# Patient Record
Sex: FEMALE | Race: BLACK OR AFRICAN AMERICAN | NOT HISPANIC OR LATINO | Employment: OTHER | URBAN - METROPOLITAN AREA
[De-identification: names, ages, dates, MRNs, and addresses within clinical notes are randomized per-mention and may not be internally consistent; named-entity substitution may affect disease eponyms.]

---

## 2017-01-17 ENCOUNTER — HOSPITAL ENCOUNTER (EMERGENCY)
Facility: OTHER | Age: 62
Discharge: HOME | End: 2017-01-17
Attending: EMERGENCY MEDICINE
Payer: COMMERCIAL

## 2017-01-17 VITALS
HEIGHT: 64 IN | OXYGEN SATURATION: 100 % | TEMPERATURE: 98 F | BODY MASS INDEX: 28.85 KG/M2 | SYSTOLIC BLOOD PRESSURE: 138 MMHG | RESPIRATION RATE: 16 BRPM | HEART RATE: 57 BPM | DIASTOLIC BLOOD PRESSURE: 75 MMHG | WEIGHT: 169 LBS

## 2017-01-17 DIAGNOSIS — R52 PAIN: ICD-10-CM

## 2017-01-17 DIAGNOSIS — M25.512 ACUTE PAIN OF LEFT SHOULDER: Primary | ICD-10-CM

## 2017-01-17 DIAGNOSIS — J06.9 VIRAL URI: ICD-10-CM

## 2017-01-17 PROCEDURE — 93010 ELECTROCARDIOGRAM REPORT: CPT | Mod: ,,, | Performed by: INTERNAL MEDICINE

## 2017-01-17 PROCEDURE — 99284 EMERGENCY DEPT VISIT MOD MDM: CPT

## 2017-01-17 RX ORDER — NAPROXEN 500 MG/1
500 TABLET ORAL
Status: DISCONTINUED | OUTPATIENT
Start: 2017-01-17 | End: 2017-01-17 | Stop reason: HOSPADM

## 2017-01-17 RX ORDER — NAPROXEN 500 MG/1
500 TABLET ORAL 2 TIMES DAILY WITH MEALS
Qty: 20 TABLET | Refills: 0 | Status: SHIPPED | OUTPATIENT
Start: 2017-01-17 | End: 2017-07-31

## 2017-01-17 RX ORDER — FLUTICASONE PROPIONATE 50 MCG
1 SPRAY, SUSPENSION (ML) NASAL 2 TIMES DAILY PRN
Qty: 15 G | Refills: 0 | Status: SHIPPED | OUTPATIENT
Start: 2017-01-17 | End: 2017-07-31

## 2017-01-17 NOTE — ED AVS SNAPSHOT
[facility]  [address]  [place] [address]               Jamee Purvis   2017  7:06 PM   ED    Description:  Female : 1955   Department:  [facility]           Your Care was Coordinated By:     Provider Role From To    Danika Andino MD Attending Provider 17 --    Margo Winchester PA-C Physician Assistant 17 --      Reason for Visit     Shoulder Pain     Nasal Congestion           Diagnoses this Visit        Comments    Acute pain of left shoulder    -  Primary     Pain         Viral URI           ED Disposition     None           To Do List           Follow-up Information     Follow up with [facility] In 2 days.    Contact information:    [address]  [place] [id number] [phone]          Follow up with [facility].    Specialty:  Emergency Medicine    Why:  If symptoms worsen    Contact information:    [address]  [facility] [address] [phone]       These Medications        Disp Refills Start End    naproxen (NAPROSYN) 500 MG tablet 20 tablet 0 2017     Take 1 tablet (500 mg total) by mouth 2 (two) times daily with meals. - Oral    fluticasone (FLONASE) 50 mcg/actuation nasal spray 15 g 0 2017     1 spray by Each Nare route 2 (two) times daily as needed. - Each Nare      [facility] On Call     [facility] On Call Nurse Care Line -  Assistance  Registered nurses in the [facility] provide clinical advisement, health education, appointment booking, and other advisory services.  Call for this free service at [phone].             Medications           Message regarding Medications     Verify the changes and/or additions to your medication regime listed below are the same as discussed with your clinician today.  If any of these changes or additions are incorrect, please notify your healthcare provider.        START taking these  "NEW medications        Refills    naproxen (NAPROSYN) 500 MG tablet 0    Sig: Take 1 tablet (500 mg total) by mouth 2 (two) times daily with meals.    Class: Print    Route: Oral    fluticasone (FLONASE) 50 mcg/actuation nasal spray 0    Si spray by Each Nare route 2 (two) times daily as needed.    Class: Print    Route: Each Nare      These medications were administered today        Dose Freq    naproxen tablet 500 mg 500 mg ED 1 Time    Sig: Take 1 tablet (500 mg total) by mouth ED 1 Time.    Class: Normal    Route: Oral           Verify that the below list of medications is an accurate representation of the medications you are currently taking.  If none reported, the list may be blank. If incorrect, please contact your healthcare provider. Carry this list with you in case of emergency.           Current Medications     fluticasone (FLONASE) 50 mcg/actuation nasal spray 1 spray by Each Nare route 2 (two) times daily as needed.    naproxen (NAPROSYN) 500 MG tablet Take 1 tablet (500 mg total) by mouth 2 (two) times daily with meals.    naproxen tablet 500 mg Take 1 tablet (500 mg total) by mouth ED 1 Time.           Clinical Reference Information           Your Vitals Were     BP Pulse Temp Resp Height Weight    174/80 (BP Location: Left arm, Patient Position: Sitting) 59 98.4 °F (36.9 °C) (Oral) 18 5' 4" (1.626 m) 76.7 kg (169 lb)    SpO2 BMI             100% 29.01 kg/m2         Allergies as of 2017     No Known Allergies      Immunizations Administered on Date of Encounter - 2017     None      ED Micro, Lab, POCT     None      ED Imaging Orders     Start Ordered       Status Ordering Provider    17  X-Ray Shoulder Trauma Left  1 time imaging      Final result       Discharge References/Attachments     ARTHRALGIA (ENGLISH)    URI, VIRAL, NO ABX (ADULT) (ENGLISH)    SHOULDER PAIN, UNCERTAIN CAUSE (ENGLISH)    LARYNGITIS (ENGLISH)      [facility] Sign-Up     Activating your " [facility] account is as easy as 1-2-3!     1) Visit [email or web address], select Sign Up Now, enter this activation code and your date of birth, then select Next.  [email or web address]  Expires: 3/3/2017  7:50 PM      2) Create a username and password to use when you visit [facility] in the future and select a security question in case you lose your password and select Next.    3) Enter your e-mail address and click Sign Up!    Additional Information  If you have questions, please e-mail [email or web address] or call [phone] to talk to our [facility] staff. Remember, [facility] is NOT to be used for urgent needs. For medical emergencies, dial 911.          [facility] complies with applicable Federal civil rights laws and does not discriminate on the basis of race, color, national origin, age, disability, or sex.        Language Assistance Services     ATTENTION: Language assistance services are available, free of charge. Please call [phone].      ATENCIÓN: Si habla español, tiene a mosquera disposición servicios gratuitos de asistencia lingüística. Llame al [phone].     GILBERT Ý: N?u b?n nói Ti?ng Vi?t, có các d?ch v? h? tr? ngôn ng? mi?n phí dành cho b?n. G?i s? [phone].

## 2017-01-18 NOTE — ED PROVIDER NOTES
Encounter Date: 1/17/2017       History     Chief Complaint   Patient presents with    Shoulder Pain     pt c/o left shoulder pain that started a few weeks ago; denies any falls/injuries; some SOB with exertion    Nasal Congestion     pt reports nasal congestion with a mild cough and hoarseness     Review of patient's allergies indicates:  No Known Allergies  HPI Comments: Patient is a 61 y.o. female with a past medical history of diabetes, presenting to the emergency department with complaints of nasal congestion and left shoulder pain.  The patient reports that she has had had left shoulder pain for over 2 months.  She denies injury or trauma, but states that it worsens with movement.  She denies numbness, tingling, weakness of her upper extremities bilaterally.  She denies chest pain, reports some shortness of breath after she walks for a long time.  In addition, she reports she has developed nasal congestion and lost her voice over the past 2 days.  She denies taking any medication for symptoms thus far.  She denies fever, chills, nausea, vomiting.  No known sick contacts.    The history is provided by the patient.     Past Medical History   Diagnosis Date    Diabetes mellitus      No past medical history pertinent negatives.  Past Surgical History   Procedure Laterality Date    Hysterectomy       History reviewed. No pertinent family history.  Social History   Substance Use Topics    Smoking status: Never Smoker    Smokeless tobacco: None    Alcohol use No     Review of Systems   Constitutional: Negative for activity change, appetite change, chills, fatigue and fever.   HENT: Positive for congestion and rhinorrhea. Negative for sinus pressure, sneezing, sore throat and trouble swallowing.    Eyes: Negative for photophobia and visual disturbance.   Respiratory: Negative for cough, chest tightness, shortness of breath and wheezing.    Cardiovascular: Negative for chest pain and palpitations.    Gastrointestinal: Negative for abdominal pain, constipation, diarrhea, nausea and vomiting.   Genitourinary: Negative for dysuria, hematuria and urgency.   Musculoskeletal: Positive for arthralgias and myalgias. Negative for back pain, neck pain and neck stiffness.   Skin: Negative for color change and wound.   Neurological: Negative for dizziness, weakness, light-headedness, numbness and headaches.   Psychiatric/Behavioral: Negative for agitation and confusion.       Physical Exam   Initial Vitals   BP Pulse Resp Temp SpO2   01/17/17 1743 01/17/17 1743 01/17/17 1743 01/17/17 1743 01/17/17 1743   174/80 59 18 98.4 °F (36.9 °C) 100 %     Physical Exam    Nursing note and vitals reviewed.  Constitutional: She appears well-developed and well-nourished. She is not diaphoretic. She is cooperative.  Non-toxic appearance. She does not have a sickly appearance. She does not appear ill. No distress.   Well appearing, -American female unaccompanied in the emergency department.  She is speaking in clear and full sentences, moving all extremities, ambulates without difficulty.  She is in no acute distress.   HENT:   Head: Normocephalic and atraumatic.   Right Ear: Hearing, tympanic membrane, external ear and ear canal normal.   Left Ear: Hearing, tympanic membrane, external ear and ear canal normal.   Nose: Mucosal edema and rhinorrhea present.   Mouth/Throat: Uvula is midline and mucous membranes are normal.   Posterior oral pharyngeal cobblestoning   Eyes: Conjunctivae and EOM are normal.   Neck: Normal range of motion. Neck supple.   Cardiovascular: Normal rate, regular rhythm and normal heart sounds.   Pulmonary/Chest: Breath sounds normal. No respiratory distress. She has no wheezes. She has no rhonchi. She has no rales.   Lungs are clear to auscultation bilaterally   Abdominal: Soft. Bowel sounds are normal. She exhibits no distension. There is no tenderness. There is no rebound and no guarding.    Musculoskeletal: Normal range of motion.        Arms:  Mild tenderness to palpation of the right posterior shoulder with no palpable deformity, crepitus, step-off.  Normal range of motion, strength, sensation.  Good pulses bilaterally.   Neurological: She is alert and oriented to person, place, and time. GCS eye subscore is 4. GCS verbal subscore is 5. GCS motor subscore is 6.   Skin: Skin is warm.   Psychiatric: She has a normal mood and affect. Her behavior is normal. Judgment and thought content normal.         ED Course   Procedures  Labs Reviewed - No data to display  EKG Readings: (Independently Interpreted)   Initial Reading: No STEMI. Rhythm: Sinus Bradycardia. Heart Rate: 57. Ectopy: No Ectopy. Conduction: Normal.     Imaging Results         X-Ray Shoulder Trauma Left (Final result) Result time:  01/17/17 19:43:56    Final result by Joe Garcia MD (01/17/17 19:43:56)    Impression:        No radiographic evidence for acute traumatic injury.  ______________________________________     Electronically signed by resident: JOE GARCIA MD  Date:     01/17/17  Time:    19:38            As the supervising and teaching physician, I personally reviewed the images and resident's interpretation and I agree with the findings.          Electronically signed by: ELI CALDERA MD  Date:     01/17/17  Time:    19:43     Narrative:    3 view left shoulder.    Comparison: None.    Findings:    No evidence for acute displaced fracture or dislocation.  No significant change.  No appreciable soft tissue swelling.  Visualized left lung fields are clear.             X-Rays:   Independently Interpreted Readings:   Other Readings:  X-ray left shoulder - no acute fracture or dislocation     Medical Decision Making:   Independently Interpreted Test(s):   I have ordered and independently interpreted X-rays - see prior notes.  I have ordered and independently interpreted EKG Reading(s) - see prior notes  Clinical Tests:   Radiological  Study: Ordered and Reviewed  Medical Tests: Ordered and Reviewed  Other:   I have discussed this case with another health care provider.       <> Summary of the Discussion: Dr. Andino  This note was created using Dragon Medical Dictation. There may be typographical errors secondary to dictation.     Urgent evaluation of a 61 y.o. female with a past medical history of DM, presenting to the emergency department complaining of nasal congestion, rhinorrhea and left shoulder pain. Patient is afebrile, nontoxic appearing, hemodynamically stable and neurovascularly intact.  Physical exam reveals regular rate and rhythm, lungs are clear to auscultation bilaterally.  Boggy nasal mucosa noted with posterior oropharyngeal cobblestoning.  Tenderness to palpation of the left posterior shoulder with no palpable deformity, crepitus, step-off.  Normal range of motion, strength, sensation.  Given the patient's history and physical exam findings, I do suspect muscular fell etiology in addition to a viral URI.  We'll obtain x-ray, EKG, and reassess.  EKG shows sinus bradycardia with a rate of 57 bpm, no STEMI.  X-ray shows no acute process. Patient's O2 sat is 100%, she denies chest pain or current shortness of breath. I do not feel that further testing or imaging is warranted. The patient will be administered naproxen and will be discharged home with naproxen and Flonase. She was counseled on symptomatic care and treatment. She is stable for discharge home. The patient was instructed to follow up with a primary care provider in 2 days or to return to the emergency department for worsening symptoms. The treatment plan was discussed with the patient who demonstrated understanding and comfort with plan. The patient's history, physical exam, and plan of care was discussed with and agreed upon with my supervising physician.     Past Medical History   Diagnosis Date    Diabetes mellitus                      ED Course     Clinical  Impression:     1. Acute pain of left shoulder    2. Pain    3. Viral URI       Disposition:   Disposition: Discharged  Condition: Stable       Margo Winchester PA-C  01/17/17 1952

## 2017-01-18 NOTE — ED NOTES
"Pt presents to the ED with c/o 10/10 L shoulder pain for "a long time." Pt reports it has been going on for about a month. Pt reports she works [employer] and is constantly lifting and moving her arms. Pt has more pain when lifting up but has good ROM and no deficits. Pt also has multiple other medical complaints and has not seen a PCP since last year. Pt reports she has been out of her metformin for a year, bilateral leg pain, and nasal congestion. Pt educated on how to call for a PCP appointment. Pt appears non toxic and in no signs of acute distress.   "

## 2017-04-19 ENCOUNTER — HOSPITAL ENCOUNTER (EMERGENCY)
Facility: HOSPITAL | Age: 62
Discharge: HOME | End: 2017-04-19
Attending: EMERGENCY MEDICINE

## 2017-04-19 VITALS
WEIGHT: 158 LBS | RESPIRATION RATE: 18 BRPM | HEART RATE: 69 BPM | BODY MASS INDEX: 26.98 KG/M2 | HEIGHT: 64 IN | SYSTOLIC BLOOD PRESSURE: 132 MMHG | OXYGEN SATURATION: 99 % | TEMPERATURE: 99 F | DIASTOLIC BLOOD PRESSURE: 76 MMHG

## 2017-04-19 DIAGNOSIS — E11.8 TYPE 2 DIABETES MELLITUS WITH COMPLICATION, WITHOUT LONG-TERM CURRENT USE OF INSULIN: Primary | ICD-10-CM

## 2017-04-19 DIAGNOSIS — J01.00 ACUTE NON-RECURRENT MAXILLARY SINUSITIS: ICD-10-CM

## 2017-04-19 LAB
BUN SERPL-MCNC: 14 MG/DL (ref 6–30)
CHLORIDE SERPL-SCNC: 99 MMOL/L (ref 95–110)
CREAT SERPL-MCNC: 0.5 MG/DL (ref 0.5–1.4)
GLUCOSE SERPL-MCNC: 233 MG/DL (ref 70–110)
HCT VFR BLD CALC: 44 %PCV (ref 36–54)
POC IONIZED CALCIUM: 1.13 MMOL/L (ref 1.06–1.42)
POC TCO2 (MEASURED): 28 MMOL/L (ref 23–29)
POTASSIUM BLD-SCNC: 3.9 MMOL/L (ref 3.5–5.1)
SAMPLE: ABNORMAL
SODIUM BLD-SCNC: 138 MMOL/L (ref 136–145)

## 2017-04-19 PROCEDURE — 99284 EMERGENCY DEPT VISIT MOD MDM: CPT

## 2017-04-19 PROCEDURE — 99284 EMERGENCY DEPT VISIT MOD MDM: CPT | Mod: ,,, | Performed by: EMERGENCY MEDICINE

## 2017-04-19 RX ORDER — METFORMIN HYDROCHLORIDE 500 MG/1
500 TABLET ORAL 2 TIMES DAILY WITH MEALS
COMMUNITY
End: 2020-03-08 | Stop reason: SDUPTHER

## 2017-04-19 RX ORDER — AMOXICILLIN AND CLAVULANATE POTASSIUM 875; 125 MG/1; MG/1
1 TABLET, FILM COATED ORAL 2 TIMES DAILY
Qty: 20 TABLET | Refills: 0 | Status: SHIPPED | OUTPATIENT
Start: 2017-04-19 | End: 2017-04-29

## 2017-04-19 RX ORDER — FLUTICASONE PROPIONATE 50 MCG
1 SPRAY, SUSPENSION (ML) NASAL 2 TIMES DAILY
Qty: 1 BOTTLE | Refills: 0 | Status: SHIPPED | OUTPATIENT
Start: 2017-04-19 | End: 2017-04-29

## 2017-04-19 RX ORDER — METFORMIN HYDROCHLORIDE 500 MG/1
500 TABLET ORAL 2 TIMES DAILY
Qty: 60 TABLET | Refills: 0 | Status: SHIPPED | OUTPATIENT
Start: 2017-04-19 | End: 2017-05-19

## 2017-04-19 NOTE — ED PROVIDER NOTES
Encounter Date: 4/19/2017    SCRIBE #1 NOTE: I, Gloria Ortiz, am scribing for, and in the presence of,  Dr. Fenton. I have scribed the entire note.       History     Chief Complaint   Patient presents with    Generalized Body Aches     sinus     Review of patient's allergies indicates:  No Known Allergies  HPI Comments: Time seen by provider: 10:55 AM    This is a 62 y.o. female who presents with complaint of sinus congestion for a little over one week. Pt also c/o generalized body aches, sore throat, cough productive of mucus, rhinorrhea (bloody nasal discharge), facial pressure/pain, and subjective fever. Pt did not have flu shot this season. She denies vomiting, CP, or any other symptoms at this time. Hx of DM however she has not been taking metformin for the past year, no current PCP.     The history is provided by the patient and medical records.     Past Medical History:   Diagnosis Date    Diabetes mellitus      Past Surgical History:   Procedure Laterality Date    HYSTERECTOMY       History reviewed. No pertinent family history.  Social History   Substance Use Topics    Smoking status: Never Smoker    Smokeless tobacco: None    Alcohol use No     Review of Systems   Constitutional: Positive for fever (subjectuve).   HENT: Positive for congestion (sinus), rhinorrhea and sore throat.         (+) Facial pressure/pain   Eyes: Negative for visual disturbance.   Respiratory: Positive for cough (productive of mucus). Negative for shortness of breath.    Cardiovascular: Negative for chest pain.   Gastrointestinal: Negative for nausea and vomiting.   Musculoskeletal:        (+) Generalized body aches   Skin: Negative for rash.   Neurological: Headaches: frontal.       Physical Exam   Initial Vitals   BP Pulse Resp Temp SpO2   04/19/17 1044 04/19/17 1044 04/19/17 1044 04/19/17 1044 04/19/17 1044   132/76 69 18 98.6 °F (37 °C) 99 %     Physical Exam    Nursing note and vitals reviewed.  Constitutional: She  appears well-developed and well-nourished. She is not diaphoretic. No distress.   HENT:   Head: Normocephalic and atraumatic.   TTP of maxillary sinus bilaterally   Cardiovascular: Normal rate, regular rhythm, normal heart sounds and intact distal pulses.   Pulmonary/Chest: Breath sounds normal. No respiratory distress. She has no wheezes. She has no rhonchi. She has no rales.   Abdominal: Soft. There is no tenderness. There is no rebound and no guarding.   Neurological: She is alert and oriented to person, place, and time.         ED Course   Procedures  Labs Reviewed   ISTAT PROCEDURE - Abnormal; Notable for the following:        Result Value    POC Glucose 233 (*)     All other components within normal limits             Medical Decision Making:   History:   Old Medical Records: I decided to obtain old medical records.  Old Records Summarized: other records.       <> Summary of Records: Pt with hx of DM, previously seen in ED in January for shoulder pain.   Initial Assessment:   Pt with hx of DM, noncompliant with medications, presenting for one week of URI symptoms that are worsening. Associated facial pressure and pain, congestion, and bloody nasal discharge. Presentation consistent with sinusitis. Will check blood glucose and start antibiotics. Vital signs are stable.   Clinical Tests:   Lab Tests: Ordered and Reviewed  ED Management:  11:45 AM  Blood glucose 233. Creatinine of 0.5. Calculated anion gap 11. Will restart metformin and start Augmentin for sinusitis as well as Flonase nasal spray. Pt will be given follow up with PCP and OB/GYN at her request.             Scribe Attestation:   Scribe #1: I performed the above scribed service and the documentation accurately describes the services I performed. I attest to the accuracy of the note.    Attending Attestation:           Physician Attestation for Scribe:  Physician Attestation Statement for Scribe #1: I, Dr. Fenton, reviewed documentation, as scribed by  Gloria Ortiz in my presence, and it is both accurate and complete.                 ED Course     Clinical Impression:   The primary encounter diagnosis was Type 2 diabetes mellitus with complication, without long-term current use of insulin. A diagnosis of Acute non-recurrent maxillary sinusitis was also pertinent to this visit.    Disposition:   Disposition: Discharged  Condition: Stable       Elida Fenton MD  04/20/17 [id number]

## 2017-04-19 NOTE — DISCHARGE INSTRUCTIONS
Sinusitis (Antibiotic Treatment)    The sinuses are air-filled spaces within the bones of the face. They connect to the inside of the nose. Sinusitis is an inflammation of the tissue lining the sinus cavity. Sinus inflammation can occur during a cold. It can also be due to allergies to pollens and other particles in the air. Sinusitis can cause symptoms of sinus congestion and fullness. A sinus infection causes fever, headache and facial pain. There is often green or yellow drainage from the nose or into the back of the throat (post-nasal drip). You have been given antibiotics to treat this condition.  Home care:  · Take the full course of antibiotics as instructed. Do not stop taking them, even if you feel better.  · Drink plenty of water, hot tea, and other liquids. This may help thin mucus. It also may promote sinus drainage.  · Heat may help soothe painful areas of the face. Use a towel soaked in hot water. Or,  the shower and direct the hot spray onto your face. Using a vaporizer along with a menthol rub at night may also help.   · An expectorant containing guaifenesin may help thin the mucus and promote drainage from the sinuses.  · Over-the-counter decongestants may be used unless a similar medicine was prescribed. Nasal sprays work the fastest. Use one that contains phenylephrine or oxymetazoline. First blow the nose gently. Then use the spray. Do not use these medicines more often than directed on the label or symptoms may get worse. You may also use tablets containing pseudoephedrine. Avoid products that combine ingredients, because side effects may be increased. Read labels. You can also ask the pharmacist for help. (NOTE: Persons with high blood pressure should not use decongestants. They can raise blood pressure.)  · Over-the-counter antihistamines may help if allergies contributed to your sinusitis.    · Do not use nasal rinses or irrigation during an acute sinus infection, unless told to by  your health care provider. Rinsing may spread the infection to other sinuses.  · Use acetaminophen or ibuprofen to control pain, unless another pain medicine was prescribed. (If you have chronic liver or kidney disease or ever had a stomach ulcer, talk with your doctor before using these medicines. Aspirin should never be used in anyone under 18 years of age who is ill with a fever. It may cause severe liver damage.)  · Don't smoke. This can worsen symptoms.  Follow-up care  Follow up with your healthcare provider or our staff if you are not improving within the next week.  When to seek medical advice  Call your healthcare provider if any of these occur:  · Facial pain or headache becoming more severe  · Stiff neck  · Unusual drowsiness or confusion  · Swelling of the forehead or eyelids  · Vision problems, including blurred or double vision  · Fever of 100.4ºF (38ºC) or higher, or as directed by your healthcare provider  · Seizure  · Breathing problems  · Symptoms not resolving within 10 days  Date Last Reviewed: 4/13/2015 © 2000-2016 [employer]. [address] [place] [address]. All rights reserved. This information is not intended as a substitute for professional medical care. Always follow your healthcare professional's instructions.          Diabetes (General Information)  Diabetes is a long-term health problem. It means your body does not make enough insulin. Or it may mean that your body cannot use the insulin it makes. Insulin is a hormone in your body. It lets blood sugar (glucose) reach the cells in your body. All of your cells need glucose for fuel.  When you have diabetes, the glucose in your blood builds up because it cannot get into the cells. This buildup is called high blood sugar (hyperglycemia).  Your blood sugar level depends on several things. It depends on what kind of food you eat and how much of it you eat. It also depends on how much exercise you get, and how much  insulin you have in your body. Eating too much of the wrong kinds of food or not taking diabetes medicine on time can cause high blood sugar. Infections can cause high blood sugar even if you are taking medicines correctly.  These things can also cause low blood sugar:  · Missing meals  · Not eating enough food  · Taking too much diabetes medicine  Diabetes can cause serious problems over time if you do not get treated. These problems include heart disease, stroke, kidney failure, and blindness. They also include nerve pain or loss of feeling in your legs and feet, and gangrene of the feet. By keeping your blood sugar under control you can prevent or delay these problems.  Normal blood sugar levels are 80 to 100 before a meal and less than 180 in the 1 to 2 hours after a meal.  Home care  Follow these guidelines when caring for yourself at home:  · Follow the diet your healthcare provider gives you. Take insulin or other diabetes medicine exactly as told to.  · Watch your blood sugar as you are told to. Keep a log of your results. This will help your provider change your medicines to keep your blood sugar under control.  · Try to reach your ideal weight. You may be able to cut back on or not have to take diabetes medicine if you eat the right foods and get exercise.  · Do not smoke. Smoking worsens the effects of diabetes on your circulation. You are much more likely to have a heart attack if you have diabetes and you smoke.  · Take good care of your feet. If you have lost feeling in your feet, you may not see an injury or infection. Check your feet and between your toes at least once a week.  · Wear a medical alert bracelet or necklace, or carry a card in your wallet that says you have diabetes. This will help healthcare providers give you the right care if you get very ill and cannot tell them that you have diabetes.  Sick day plan  If you get a cold, the flu, or a bacterial or viral infection, take these  steps:  · Look at your diabetes sick plan and call your healthcare provider as you were told to. You may need to call your provider right away if:  ¨ Your blood sugar is above 240 while taking your diabetes medicine  ¨ Your urine ketone levels are above normal or high  ¨ You have been vomiting more than 6 hours  ¨ You have trouble breathing or your breath ha s a fruity smell  ¨ You have a high fever  ¨ You have a fever for several days and you are not getting better  ¨ You get light-headed and are sleepier than usual  · Keep taking your diabetes pills (oral medicine) even if you have been vomiting and are feeling sick. Call your provider right away because you may need insulin to lower your blood sugar until you recover from your illness.  · Keep taking your insulin even if you have been vomiting and are feeling sick. Call your provider right away to ask if you need to change your insulin dose. This will depend on your blood sugar results.  · Check your blood sugar every 2 to 4 hours, or at least 4 times a day.  · Check your ketones often. If you are vomiting and having diarrhea, watch them more often.  · Do not skip meals. Try to eat small meals on a regular schedule. Do this even if you do not feel like eating.  · Drink water or other liquids that do not have caffeine or calories. This will keep you from getting dehydrated. If you are nauseated or vomiting, takes small sips every 5 minutes. To prevent dehydration try to drink a cup (8 ounces) of fluids every hour while you are awake.  General care  Always bring a source of fast-acting sugar with you in case you have symptoms of low blood sugar (below 70). At the first sign of low blood sugar, eat or drink 15 to 20 grams of fast-acting sugar to raise your blood sugar. Examples are:  · 3 to 4 glucose tablets. You can buy these at most drugstores.  · 4 ounces (1/2 cup) of regular (not diet) soft drinks  · 4 ounces (1/2 cup) of any fruit juice  · 8 ounces (1 cup) of  milk  · 5 to 6 pieces of hard candy  · 1 tablespoon of honey  Check your blood sugar 15 minutes after treating yourself. If it is still below 70, take 15 to 20 more grams of fast-acting sugar. Test again in 15 minutes. If it returns to normal (70 or above), eat a snack or meal to keep your blood sugar in a safe range. If it stays low, call your doctor or go to an emergency room.  Follow-up care  Follow-up with your healthcare provider, or as advised. For more information about diabetes, visit the [employer] Association website at www.diabetes.org or call 652-140-4543.  When to seek medical advice  Call your healthcare provider right away if you have any of these symptoms of high blood sugar:  · Frequent urination  · Dizziness  · Drowsiness  · Thirst  · Headache  · Nausea or vomiting  · Abdominal pain  · Eyesight changes  · Fast breathing  · Confusion or loss of consciousness  Also call your provider right away if you have any of these signs of low blood sugar:  · Fatigue  · Headache  · Shakes  · Excess sweating  · Hunger  · Feeling anxious or restless  · Eyesight changes  · Drowsiness  · Weakness  · Confusion or loss of consciousness  Call 911  Call for emergency help right away if any of these occur:  · Chest pain or shortness of breath  · Dizziness or fainting  · Weakness of an arm or leg or one side of the face  · Trouble speaking or seeing   Date Last Reviewed: 6/1/2016  © 2193-2920 [employer]. [address] [place] [address], [place] [address]. All rights reserved. This information is not intended as a substitute for professional medical care. Always follow your healthcare professional's instructions.

## 2017-04-19 NOTE — ED AVS SNAPSHOT
[facility]  [address]  [facility] [address]               Jamee Purvis   2017 10:45 AM   ED    Description:  Female : 1955   Department:  [facility]           Your Care was Coordinated By:     Provider Role From To    Elida Fenton MD Attending Provider 17 1048 --      Reason for Visit     Generalized Body Aches           Diagnoses this Visit        Comments    Type 2 diabetes mellitus with complication, without long-term current use of insulin    -  Primary     Acute non-recurrent maxillary sinusitis           ED Disposition     None           To Do List           Follow-up Information     Follow up with [facility] In 3 days.    Specialty:  Internal Medicine    Contact information:    [address]  [facility] [address] [phone]    Additional information:    [facility]        Follow up with [facility] [address] [facility] In 1 week.    Specialty:  Outpatient Rehab    Contact information:    [address] [address] [phone]       These Medications        Disp Refills Start End    amoxicillin-clavulanate 875-125mg (AUGMENTIN) 875-125 mg per tablet 20 tablet 0 2017    Take 1 tablet by mouth 2 (two) times daily. - Oral    fluticasone (FLONASE) 50 mcg/actuation nasal spray 1 Bottle 0 2017    1 spray by Each Nare route 2 (two) times daily. - Each Nare    metformin (GLUCOPHAGE) 500 MG tablet 60 tablet 0 2017    Take 1 tablet (500 mg total) by mouth 2 (two) times daily. - Oral      [facility]     [facility] -  Assistance  Unless otherwise directed by your provider, please contact [facility], our nurse care line that is available for  assistance.     Registered nurses in the [facility] provide: appointment scheduling,  clinical advisement, health education, and other advisory services.  Call: [phone] (toll free)               Medications           Message regarding Medications     Verify the changes and/or additions to your medication regime listed below are the same as discussed with your clinician today.  If any of these changes or additions are incorrect, please notify your healthcare provider.        START taking these NEW medications        Refills    amoxicillin-clavulanate 875-125mg (AUGMENTIN) 875-125 mg per tablet 0    Sig: Take 1 tablet by mouth 2 (two) times daily.    Class: Print    Route: Oral    fluticasone (FLONASE) 50 mcg/actuation nasal spray 0    Si spray by Each Nare route 2 (two) times daily.    Class: Print    Route: Each Nare    metformin (GLUCOPHAGE) 500 MG tablet 0    Sig: Take 1 tablet (500 mg total) by mouth 2 (two) times daily.    Class: Print    Route: Oral           Verify that the below list of medications is an accurate representation of the medications you are currently taking.  If none reported, the list may be blank. If incorrect, please contact your healthcare provider. Carry this list with you in case of emergency.           Current Medications     amoxicillin-clavulanate 875-125mg (AUGMENTIN) 875-125 mg per tablet Take 1 tablet by mouth 2 (two) times daily.    fluticasone (FLONASE) 50 mcg/actuation nasal spray 1 spray by Each Nare route 2 (two) times daily as needed.    fluticasone (FLONASE) 50 mcg/actuation nasal spray 1 spray by Each Nare route 2 (two) times daily.    metformin (GLUCOPHAGE) 500 MG tablet Take 500 mg by mouth 2 (two) times daily with meals.    metformin (GLUCOPHAGE) 500 MG tablet Take 1 tablet (500 mg total) by mouth 2 (two) times daily.    naproxen (NAPROSYN) 500 MG tablet Take 1 tablet (500 mg total) by mouth 2 (two) times daily with meals.           Clinical Reference Information           Your Vitals Were     BP Pulse Temp Resp Height Weight    132/76 69 98.6 °F  "(37 °C) (Oral) 18 5' 4" (1.626 m) 71.7 kg (158 lb)    SpO2 BMI             99% 27.12 kg/m2         Allergies as of 4/19/2017     No Known Allergies      Immunizations Administered on Date of Encounter - 4/19/2017     None      ED Micro, Lab, POCT     Start Ordered       Status Ordering Provider    04/19/17 1118 04/19/17 1118  ISTAT PROCEDURE  Once      Final result     04/19/17 1110 04/19/17 1109  ISTAT CHEM8  Once      Acknowledged     04/19/17 1109 04/19/17 1108    Once,   Status:  Canceled      Canceled       ED Imaging Orders     None        Discharge Instructions         Sinusitis (Antibiotic Treatment)    The sinuses are air-filled spaces within the bones of the face. They connect to the inside of the nose. Sinusitis is an inflammation of the tissue lining the sinus cavity. Sinus inflammation can occur during a cold. It can also be due to allergies to pollens and other particles in the air. Sinusitis can cause symptoms of sinus congestion and fullness. A sinus infection causes fever, headache and facial pain. There is often green or yellow drainage from the nose or into the back of the throat (post-nasal drip). You have been given antibiotics to treat this condition.  Home care:  · Take the full course of antibiotics as instructed. Do not stop taking them, even if you feel better.  · Drink plenty of water, hot tea, and other liquids. This may help thin mucus. It also may promote sinus drainage.  · Heat may help soothe painful areas of the face. Use a towel soaked in hot water. Or,  the shower and direct the hot spray onto your face. Using a vaporizer along with a menthol rub at night may also help.   · An expectorant containing guaifenesin may help thin the mucus and promote drainage from the sinuses.  · Over-the-counter decongestants may be used unless a similar medicine was prescribed. Nasal sprays work the fastest. Use one that contains phenylephrine or oxymetazoline. First blow the nose gently. " Then use the spray. Do not use these medicines more often than directed on the label or symptoms may get worse. You may also use tablets containing pseudoephedrine. Avoid products that combine ingredients, because side effects may be increased. Read labels. You can also ask the pharmacist for help. (NOTE: Persons with high blood pressure should not use decongestants. They can raise blood pressure.)  · Over-the-counter antihistamines may help if allergies contributed to your sinusitis.    · Do not use nasal rinses or irrigation during an acute sinus infection, unless told to by your health care provider. Rinsing may spread the infection to other sinuses.  · Use acetaminophen or ibuprofen to control pain, unless another pain medicine was prescribed. (If you have chronic liver or kidney disease or ever had a stomach ulcer, talk with your doctor before using these medicines. Aspirin should never be used in anyone under 18 years of age who is ill with a fever. It may cause severe liver damage.)  · Don't smoke. This can worsen symptoms.  Follow-up care  Follow up with your healthcare provider or our staff if you are not improving within the next week.  When to seek medical advice  Call your healthcare provider if any of these occur:  · Facial pain or headache becoming more severe  · Stiff neck  · Unusual drowsiness or confusion  · Swelling of the forehead or eyelids  · Vision problems, including blurred or double vision  · Fever of 100.4ºF (38ºC) or higher, or as directed by your healthcare provider  · Seizure  · Breathing problems  · Symptoms not resolving within 10 days  Date Last Reviewed: 4/13/2015  © 5011-1377 [employer]. [address] [place] [address], [place] [address]. All rights reserved. This information is not intended as a substitute for professional medical care. Always follow your healthcare professional's instructions.          Diabetes (General Information)  Diabetes is a long-term health problem.  It means your body does not make enough insulin. Or it may mean that your body cannot use the insulin it makes. Insulin is a hormone in your body. It lets blood sugar (glucose) reach the cells in your body. All of your cells need glucose for fuel.  When you have diabetes, the glucose in your blood builds up because it cannot get into the cells. This buildup is called high blood sugar (hyperglycemia).  Your blood sugar level depends on several things. It depends on what kind of food you eat and how much of it you eat. It also depends on how much exercise you get, and how much insulin you have in your body. Eating too much of the wrong kinds of food or not taking diabetes medicine on time can cause high blood sugar. Infections can cause high blood sugar even if you are taking medicines correctly.  These things can also cause low blood sugar:  · Missing meals  · Not eating enough food  · Taking too much diabetes medicine  Diabetes can cause serious problems over time if you do not get treated. These problems include heart disease, stroke, kidney failure, and blindness. They also include nerve pain or loss of feeling in your legs and feet, and gangrene of the feet. By keeping your blood sugar under control you can prevent or delay these problems.  Normal blood sugar levels are 80 to 100 before a meal and less than 180 in the 1 to 2 hours after a meal.  Home care  Follow these guidelines when caring for yourself at home:  · Follow the diet your healthcare provider gives you. Take insulin or other diabetes medicine exactly as told to.  · Watch your blood sugar as you are told to. Keep a log of your results. This will help your provider change your medicines to keep your blood sugar under control.  · Try to reach your ideal weight. You may be able to cut back on or not have to take diabetes medicine if you eat the right foods and get exercise.  · Do not smoke. Smoking worsens the effects of diabetes on your circulation.  You are much more likely to have a heart attack if you have diabetes and you smoke.  · Take good care of your feet. If you have lost feeling in your feet, you may not see an injury or infection. Check your feet and between your toes at least once a week.  · Wear a medical alert bracelet or necklace, or carry a card in your wallet that says you have diabetes. This will help healthcare providers give you the right care if you get very ill and cannot tell them that you have diabetes.  Sick day plan  If you get a cold, the flu, or a bacterial or viral infection, take these steps:  · Look at your diabetes sick plan and call your healthcare provider as you were told to. You may need to call your provider right away if:  ¨ Your blood sugar is above 240 while taking your diabetes medicine  ¨ Your urine ketone levels are above normal or high  ¨ You have been vomiting more than 6 hours  ¨ You have trouble breathing or your breath ha s a fruity smell  ¨ You have a high fever  ¨ You have a fever for several days and you are not getting better  ¨ You get light-headed and are sleepier than usual  · Keep taking your diabetes pills (oral medicine) even if you have been vomiting and are feeling sick. Call your provider right away because you may need insulin to lower your blood sugar until you recover from your illness.  · Keep taking your insulin even if you have been vomiting and are feeling sick. Call your provider right away to ask if you need to change your insulin dose. This will depend on your blood sugar results.  · Check your blood sugar every 2 to 4 hours, or at least 4 times a day.  · Check your ketones often. If you are vomiting and having diarrhea, watch them more often.  · Do not skip meals. Try to eat small meals on a regular schedule. Do this even if you do not feel like eating.  · Drink water or other liquids that do not have caffeine or calories. This will keep you from getting dehydrated. If you are nauseated or  vomiting, takes small sips every 5 minutes. To prevent dehydration try to drink a cup (8 ounces) of fluids every hour while you are awake.  General care  Always bring a source of fast-acting sugar with you in case you have symptoms of low blood sugar (below 70). At the first sign of low blood sugar, eat or drink 15 to 20 grams of fast-acting sugar to raise your blood sugar. Examples are:  · 3 to 4 glucose tablets. You can buy these at most drugstores.  · 4 ounces (1/2 cup) of regular (not diet) soft drinks  · 4 ounces (1/2 cup) of any fruit juice  · 8 ounces (1 cup) of milk  · 5 to 6 pieces of hard candy  · 1 tablespoon of honey  Check your blood sugar 15 minutes after treating yourself. If it is still below 70, take 15 to 20 more grams of fast-acting sugar. Test again in 15 minutes. If it returns to normal (70 or above), eat a snack or meal to keep your blood sugar in a safe range. If it stays low, call your doctor or go to an emergency room.  Follow-up care  Follow-up with your healthcare provider, or as advised. For more information about diabetes, visit the American Diabetes Association website at www.diabetes.org or call 057-637-4700.  When to seek medical advice  Call your healthcare provider right away if you have any of these symptoms of high blood sugar:  · Frequent urination  · Dizziness  · Drowsiness  · Thirst  · Headache  · Nausea or vomiting  · Abdominal pain  · Eyesight changes  · Fast breathing  · Confusion or loss of consciousness  Also call your provider right away if you have any of these signs of low blood sugar:  · Fatigue  · Headache  · Shakes  · Excess sweating  · Hunger  · Feeling anxious or restless  · Eyesight changes  · Drowsiness  · Weakness  · Confusion or loss of consciousness  Call 911  Call for emergency help right away if any of these occur:  · Chest pain or shortness of breath  · Dizziness or fainting  · Weakness of an arm or leg or one side of the face  · Trouble speaking or  seeing   Date Last Reviewed: 6/1/2016  © 3340-9955 [employer] [address] [place] [address], [place] [address]. All rights reserved. This information is not intended as a substitute for professional medical care. Always follow your healthcare professional's instructions.          [facility] Sign-Up     Activating your [facility] account is as easy as 1-2-3!     1) Visit [email or web address], select Sign Up Now, enter this activation code and your date of birth, then select Next.  [email or web address]  Expires: 6/3/2017 11:41 AM      2) Create a username and password to use when you visit [facility] in the future and select a security question in case you lose your password and select Next.    3) Enter your e-mail address and click Sign Up!    Additional Information  If you have questions, please e-mail [email or web address] or call [phone] to talk to our [facility] staff. Remember, [facility] is NOT to be used for urgent needs. For medical emergencies, dial 911.          [facility] complies with applicable Federal civil rights laws and does not discriminate on the basis of race, color, national origin, age, disability, or sex.        Language Assistance Services     ATTENTION: Language assistance services are available, free of charge. Please call [phone].      ATENCIÓN: Si habla español, tiene a mosquera disposición servicios gratuitos de asistencia lingüística. Llame al [phone].     CHÚ Ý: N?u b?n nói Ti?ng Vi?t, có các d?ch v? h? tr? ngôn ng? mi?n phí dành cho b?n. G?i s? [phone].

## 2017-04-19 NOTE — ED TRIAGE NOTES
"Patient states that she has a sinus infection and "whole body hurts." Symptoms started x 1 week ago.  "

## 2017-07-31 ENCOUNTER — HOSPITAL ENCOUNTER (EMERGENCY)
Facility: OTHER | Age: 62
Discharge: HOME | End: 2017-07-31
Attending: EMERGENCY MEDICINE

## 2017-07-31 VITALS
OXYGEN SATURATION: 100 % | TEMPERATURE: 98 F | RESPIRATION RATE: 18 BRPM | HEART RATE: 50 BPM | WEIGHT: 159 LBS | DIASTOLIC BLOOD PRESSURE: 81 MMHG | HEIGHT: 64 IN | SYSTOLIC BLOOD PRESSURE: 172 MMHG | BODY MASS INDEX: 27.14 KG/M2

## 2017-07-31 DIAGNOSIS — N63.0 BREAST NODULE: Primary | ICD-10-CM

## 2017-07-31 DIAGNOSIS — S46.912A LEFT SHOULDER STRAIN, INITIAL ENCOUNTER: ICD-10-CM

## 2017-07-31 DIAGNOSIS — R73.9 HYPERGLYCEMIA: ICD-10-CM

## 2017-07-31 LAB
ALBUMIN SERPL BCP-MCNC: 3.9 G/DL
ALP SERPL-CCNC: 113 U/L
ALT SERPL W/O P-5'-P-CCNC: 18 U/L
ANION GAP SERPL CALC-SCNC: 11 MMOL/L
AST SERPL-CCNC: 23 U/L
B-OH-BUTYR BLD STRIP-SCNC: 0.2 MMOL/L
BACTERIA #/AREA URNS HPF: ABNORMAL /HPF
BASOPHILS # BLD AUTO: 0.02 K/UL
BASOPHILS NFR BLD: 0.3 %
BILIRUB SERPL-MCNC: 0.6 MG/DL
BILIRUB UR QL STRIP: NEGATIVE
BUN SERPL-MCNC: 11 MG/DL
CALCIUM SERPL-MCNC: 9.8 MG/DL
CHLORIDE SERPL-SCNC: 105 MMOL/L
CLARITY UR: CLEAR
CO2 SERPL-SCNC: 23 MMOL/L
COLOR UR: YELLOW
CREAT SERPL-MCNC: 0.8 MG/DL
DIFFERENTIAL METHOD: ABNORMAL
EOSINOPHIL # BLD AUTO: 0.2 K/UL
EOSINOPHIL NFR BLD: 3.3 %
ERYTHROCYTE [DISTWIDTH] IN BLOOD BY AUTOMATED COUNT: 13.7 %
EST. GFR  (AFRICAN AMERICAN): >60 ML/MIN/1.73 M^2
EST. GFR  (NON AFRICAN AMERICAN): >60 ML/MIN/1.73 M^2
GLUCOSE SERPL-MCNC: 229 MG/DL
GLUCOSE UR QL STRIP: ABNORMAL
HCT VFR BLD AUTO: 42 %
HGB BLD-MCNC: 13.8 G/DL
HGB UR QL STRIP: NEGATIVE
KETONES UR QL STRIP: NEGATIVE
LEUKOCYTE ESTERASE UR QL STRIP: NEGATIVE
LYMPHOCYTES # BLD AUTO: 2.7 K/UL
LYMPHOCYTES NFR BLD: 42.5 %
MCH RBC QN AUTO: 26.8 PG
MCHC RBC AUTO-ENTMCNC: 32.9 G/DL
MCV RBC AUTO: 82 FL
MICROSCOPIC COMMENT: ABNORMAL
MONOCYTES # BLD AUTO: 0.5 K/UL
MONOCYTES NFR BLD: 8.6 %
NEUTROPHILS # BLD AUTO: 2.8 K/UL
NEUTROPHILS NFR BLD: 45.1 %
NITRITE UR QL STRIP: NEGATIVE
PH UR STRIP: 6 [PH] (ref 5–8)
PLATELET # BLD AUTO: 156 K/UL
PMV BLD AUTO: 11.7 FL
POCT GLUCOSE: 270 MG/DL (ref 70–110)
POTASSIUM SERPL-SCNC: 4 MMOL/L
PROT SERPL-MCNC: 9.2 G/DL
PROT UR QL STRIP: NEGATIVE
RBC # BLD AUTO: 5.14 M/UL
RBC #/AREA URNS HPF: 1 /HPF (ref 0–4)
SODIUM SERPL-SCNC: 139 MMOL/L
SP GR UR STRIP: >=1.03 (ref 1–1.03)
SQUAMOUS #/AREA URNS HPF: 7 /HPF
URN SPEC COLLECT METH UR: ABNORMAL
UROBILINOGEN UR STRIP-ACNC: NEGATIVE EU/DL
WBC # BLD AUTO: 6.28 K/UL
WBC #/AREA URNS HPF: 4 /HPF (ref 0–5)
YEAST URNS QL MICRO: ABNORMAL

## 2017-07-31 PROCEDURE — 82010 KETONE BODYS QUAN: CPT

## 2017-07-31 PROCEDURE — 87086 URINE CULTURE/COLONY COUNT: CPT

## 2017-07-31 PROCEDURE — 81000 URINALYSIS NONAUTO W/SCOPE: CPT

## 2017-07-31 PROCEDURE — 80053 COMPREHEN METABOLIC PANEL: CPT

## 2017-07-31 PROCEDURE — 96374 THER/PROPH/DIAG INJ IV PUSH: CPT

## 2017-07-31 PROCEDURE — 85025 COMPLETE CBC W/AUTO DIFF WBC: CPT

## 2017-07-31 PROCEDURE — 99283 EMERGENCY DEPT VISIT LOW MDM: CPT

## 2017-07-31 PROCEDURE — 25000003 PHARM REV CODE 250: Performed by: PHYSICIAN ASSISTANT

## 2017-07-31 PROCEDURE — 63600175 PHARM REV CODE 636 W HCPCS: Performed by: PHYSICIAN ASSISTANT

## 2017-07-31 PROCEDURE — 82962 GLUCOSE BLOOD TEST: CPT

## 2017-07-31 PROCEDURE — 96361 HYDRATE IV INFUSION ADD-ON: CPT

## 2017-07-31 RX ORDER — IBUPROFEN 600 MG/1
600 TABLET ORAL
Status: COMPLETED | OUTPATIENT
Start: 2017-07-31 | End: 2017-07-31

## 2017-07-31 RX ORDER — SODIUM CHLORIDE 9 MG/ML
1000 INJECTION, SOLUTION INTRAVENOUS
Status: COMPLETED | OUTPATIENT
Start: 2017-07-31 | End: 2017-07-31

## 2017-07-31 RX ORDER — METFORMIN HYDROCHLORIDE 500 MG/1
500 TABLET ORAL 2 TIMES DAILY WITH MEALS
Qty: 30 TABLET | Refills: 0 | Status: SHIPPED | OUTPATIENT
Start: 2017-07-31 | End: 2018-07-31

## 2017-07-31 RX ORDER — KETOROLAC TROMETHAMINE 30 MG/ML
15 INJECTION, SOLUTION INTRAMUSCULAR; INTRAVENOUS
Status: COMPLETED | OUTPATIENT
Start: 2017-07-31 | End: 2017-07-31

## 2017-07-31 RX ADMIN — SODIUM CHLORIDE 1000 ML: 0.9 INJECTION, SOLUTION INTRAVENOUS at 01:07

## 2017-07-31 RX ADMIN — KETOROLAC TROMETHAMINE 15 MG: 30 INJECTION, SOLUTION INTRAMUSCULAR at 01:07

## 2017-07-31 RX ADMIN — IBUPROFEN 600 MG: 600 TABLET, FILM COATED ORAL at 02:07

## 2017-07-31 NOTE — ED NOTES
Pt rounding complete.  Pain 7/10, not requesting medication at this time.  Restroom and comfort needs addressed.  Pt updated on plan of care.  Will continue to monitor.

## 2017-07-31 NOTE — ED TRIAGE NOTES
Pt states she has a lump in her breast that has been present for about 2 weeks, reports weight loss over several months, is unsure of how much weight.  Also c/o L shoulder pain, worse upon movement, sates it hurts to elevate arm above shoulder height.  States she has been out of her metformin due to PCP and insurance issues, so her blood sugar is high.

## 2017-07-31 NOTE — ED PROVIDER NOTES
"Encounter Date: 7/31/2017    SCRIBE #1 NOTE: I, Madhuri Sommers, am scribing for, and in the presence of,  Carolyn Don PA-C. I have scribed the following portions of the note - Other sections scribed: HPI and ROS.       History     Chief Complaint   Patient presents with    Multiple Complaints     Painful lump to right breast x couple of weeks, left neck and shoulder pain worse with movement x 1 week, "And I know my sugars up."      Time seen by provider: 1:19 PM    This is a 62 y.o. female with NIDDM who presents with complaint of a mass to the R breast x 2 weeks. She notes tingling to both breasts with mild pain to L breast. Pt denies any breast swelling or redness. She also denies nipple discharge. Pt does not have an OB/GYN nor has she had a breast exam recently. She has no FHx of breast cancer. Pt also notes a high blood sugar. She checks her sugar regularly but does not adjust her diet. She has had unexpected weight loss over the past couple months, but is unsure how much weight. She also has mild dysuria and "sinus issues", such as rhinorrhea, at baseline. Lastly, pt c/o L should pain with rotation x 2 weeks. She is constantly pulling weight for work. She has not tried any medications for relief. Pt denies fever, chills, N/V, diarrhea, vaginal bleeding, hematuria, and urinary frequency.      The history is provided by the patient.     Review of patient's allergies indicates:  No Known Allergies  Past Medical History:   Diagnosis Date    Diabetes mellitus      Past Surgical History:   Procedure Laterality Date    HYSTERECTOMY       History reviewed. No pertinent family history.  Social History   Substance Use Topics    Smoking status: Never Smoker    Smokeless tobacco: Never Used    Alcohol use No     Review of Systems   Constitutional: Positive for unexpected weight change (over a few months). Negative for chills, diaphoresis and fever.   HENT: Positive for rhinorrhea. Negative for ear pain and sore " throat.    Eyes: Negative for pain and visual disturbance.   Respiratory: Negative for cough, shortness of breath and wheezing.    Cardiovascular: Negative for chest pain.   Gastrointestinal: Negative for abdominal pain, diarrhea, nausea and vomiting.   Genitourinary: Positive for dysuria ( mild). Negative for decreased urine volume, frequency, hematuria, urgency and vaginal bleeding.   Musculoskeletal: Negative for back pain and neck pain.        L shoulder pain.   Skin: Negative for color change, pallor, rash and wound.        Mass to R breast with tingling. L breast pain with tingling. No nipple discharge.   Neurological: Negative for dizziness, weakness, light-headedness and headaches.   Hematological: Does not bruise/bleed easily.   Psychiatric/Behavioral: Negative for behavioral problems and confusion.       Physical Exam     Initial Vitals [07/31/17 1250]   BP Pulse Resp Temp SpO2   (!) 170/81 (!) 52 16 98.4 °F (36.9 °C) 99 %      MAP       110.67         Physical Exam    Nursing note and vitals reviewed.  Constitutional: She appears well-developed and well-nourished. She is not diaphoretic. No distress.   The appearing -American female in no acute distress or apparent pain.  Sitting in upright position on exam table.  Makes good eye contact, speaks in clear full sentences and ambulates with ease.   HENT:   Head: Normocephalic and atraumatic.   Dry mucous membranes   Eyes: Conjunctivae and EOM are normal. Pupils are equal, round, and reactive to light. Right eye exhibits no discharge. Left eye exhibits no discharge. No scleral icterus.   Neck: Normal range of motion.   Cardiovascular: Normal rate, regular rhythm and normal heart sounds. Exam reveals no gallop and no friction rub.    No murmur heard.  Pulmonary/Chest: Breath sounds normal. She has no wheezes. She has no rhonchi. She has no rales.   Right breast has 2 cm palpable nodule at approximately 11:00.  There is no overlying skin changes  associated lymphadenopathy nipple discharge or nipple inversion.  No appreciable breasts asymmetry.    Left breast has normal exam   Abdominal: Soft. Bowel sounds are normal. There is no tenderness. There is no rebound and no guarding.   Benign abdomen   Musculoskeletal: Normal range of motion. She exhibits no edema or tenderness.   Left shoulder has generalized tenderness to palpation with normal range of motion.  No obvious deformity.   Lymphadenopathy:     She has no cervical adenopathy.   Neurological: She is alert and oriented to person, place, and time. She has normal strength. No cranial nerve deficit or sensory deficit.   Skin: Skin is warm. Capillary refill takes less than 2 seconds. No rash and no abscess noted. No erythema.   Psychiatric: She has a normal mood and affect. Her behavior is normal. Thought content normal.         ED Course   Procedures  Labs Reviewed   POCT GLUCOSE - Abnormal; Notable for the following:        Result Value    POCT Glucose 270 (*)     All other components within normal limits   POCT GLUCOSE MONITORING CONTINUOUS         Labs Reviewed   CBC W/ AUTO DIFFERENTIAL - Abnormal; Notable for the following:        Result Value    MCH 26.8 (*)     All other components within normal limits   COMPREHENSIVE METABOLIC PANEL - Abnormal; Notable for the following:     Glucose 229 (*)     Total Protein 9.2 (*)     All other components within normal limits   URINALYSIS - Abnormal; Notable for the following:     Specific Gravity, UA >=1.030 (*)     Glucose, UA 3+ (*)     All other components within normal limits   URINALYSIS MICROSCOPIC - Abnormal; Notable for the following:     Bacteria, UA Few (*)     All other components within normal limits   POCT GLUCOSE - Abnormal; Notable for the following:     POCT Glucose 270 (*)     All other components within normal limits   CULTURE, URINE   CULTURE, URINE   BETA - HYDROXYBUTYRATE, SERUM   POCT GLUCOSE MONITORING CONTINUOUS            Medical  Decision Making:   Clinical Tests:   Lab Tests: Ordered and Reviewed  ED Management:  Urgent evaluation a 62-year-old female who presents with 3 complaints.  She is afebrile, nontoxic appearing, hemodynamically stable.  1) right breast lump: Non-tender to palpation with NO overlying skin changes concerning for possible abscess or skin infection.  Feel that malignancy cannot be ruled out until outpatient mastectomy.  This is very clearly stated to the patient who verbalizes understanding and is amenable to plan.  2) elevated blood sugar:  with dry mucous membranes.  Diagnostic labs and IV fluids given. No evidence of DKA. Patient encouraged to implement dietary changes so that her blood sugar is maintained in a safer range. She verbalizes understanding and is amenable.   3) left shoulder pain: suspect muscle strain given normal range of motion no asymmetry and no trauma or injury.  Plan for Rest and NSAIDs and follow up with primary in outpatient setting. Pt given ortho contact info in the event PCP recommends specialized care.     Patient encouraged to establish care with GYN, primary care provider and is also given orthopedic follow-up information for symptom recheck.  She is made aware of signs and symptoms of worsening that would require return to the emergency department.  She verbalizes understanding is amenable.  Case discussed with attending who agrees with plan.    Other:   I have discussed this case with another health care provider.       <> Summary of the Discussion: Trish Johnson Attestation:   Madhuibe #1: I performed the above scribed service and the documentation accurately describes the services I performed. I attest to the accuracy of the note.    Attending Attestation:           Physician Attestation for Scribe:  Physician Attestation Statement for Scribe #1: I, Carolyn Don PA-C, reviewed documentation, as scribed by Madhuri Sommers in my presence, and it is both accurate and  complete.                 ED Course     Clinical Impression:     1. Breast nodule    2. Hyperglycemia    3. Left shoulder strain, initial encounter                                 Carolyn Don PA-C  07/31/17 1280

## 2017-08-01 LAB — BACTERIA UR CULT: NORMAL

## 2020-03-08 ENCOUNTER — HOSPITAL ENCOUNTER (EMERGENCY)
Facility: OTHER | Age: 65
Discharge: HOME | End: 2020-03-08
Attending: EMERGENCY MEDICINE
Payer: MEDICAID

## 2020-03-08 VITALS
HEART RATE: 54 BPM | OXYGEN SATURATION: 100 % | RESPIRATION RATE: 18 BRPM | DIASTOLIC BLOOD PRESSURE: 69 MMHG | SYSTOLIC BLOOD PRESSURE: 154 MMHG | WEIGHT: 160 LBS | TEMPERATURE: 98 F | HEIGHT: 64 IN | BODY MASS INDEX: 27.31 KG/M2

## 2020-03-08 DIAGNOSIS — J01.90 SUBACUTE SINUSITIS, UNSPECIFIED LOCATION: Primary | ICD-10-CM

## 2020-03-08 LAB — POCT GLUCOSE: 256 MG/DL (ref 70–110)

## 2020-03-08 PROCEDURE — 25000003 PHARM REV CODE 250: Performed by: EMERGENCY MEDICINE

## 2020-03-08 PROCEDURE — 82962 GLUCOSE BLOOD TEST: CPT

## 2020-03-08 PROCEDURE — 99284 EMERGENCY DEPT VISIT MOD MDM: CPT | Mod: 25

## 2020-03-08 RX ORDER — IBUPROFEN 600 MG/1
600 TABLET ORAL
Status: COMPLETED | OUTPATIENT
Start: 2020-03-08 | End: 2020-03-08

## 2020-03-08 RX ORDER — PSEUDOEPHEDRINE HCL 30 MG
60 TABLET ORAL
Status: COMPLETED | OUTPATIENT
Start: 2020-03-08 | End: 2020-03-08

## 2020-03-08 RX ORDER — AMOXICILLIN 875 MG/1
875 TABLET, FILM COATED ORAL 2 TIMES DAILY
Qty: 12 TABLET | Refills: 0 | Status: SHIPPED | OUTPATIENT
Start: 2020-03-08 | End: 2020-03-18

## 2020-03-08 RX ORDER — FLUTICASONE PROPIONATE 50 MCG
1 SPRAY, SUSPENSION (ML) NASAL 2 TIMES DAILY PRN
Qty: 15 G | Refills: 0 | Status: ON HOLD | OUTPATIENT
Start: 2020-03-08 | End: 2020-10-30

## 2020-03-08 RX ORDER — CETIRIZINE HYDROCHLORIDE 10 MG/1
10 TABLET ORAL DAILY
Qty: 30 TABLET | Refills: 0 | Status: ON HOLD | OUTPATIENT
Start: 2020-03-08 | End: 2020-10-30

## 2020-03-08 RX ORDER — METFORMIN HYDROCHLORIDE 500 MG/1
500 TABLET ORAL 2 TIMES DAILY WITH MEALS
Qty: 60 TABLET | Refills: 0 | Status: ON HOLD | OUTPATIENT
Start: 2020-03-08 | End: 2020-03-22 | Stop reason: HOSPADM

## 2020-03-08 RX ORDER — METFORMIN HYDROCHLORIDE 500 MG/1
500 TABLET ORAL
Status: DISCONTINUED | OUTPATIENT
Start: 2020-03-08 | End: 2020-03-08 | Stop reason: HOSPADM

## 2020-03-08 RX ADMIN — IBUPROFEN 600 MG: 600 TABLET, FILM COATED ORAL at 01:03

## 2020-03-08 RX ADMIN — PSEUDOEPHEDRINE HCL 60 MG: 30 TABLET, FILM COATED ORAL at 01:03

## 2020-03-08 NOTE — ED PROVIDER NOTES
Encounter Date: 3/8/2020    SCRIBE #1 NOTE: I, Felicia Sigala, am scribing for, and in the presence of, Dr. Olivo.       History     Chief Complaint   Patient presents with    Nasal Congestion     Pt c/o sinus pressure, sore throat, bilateral earache & nasal and chest congestion. Pt also c/o pressure in the back of her neck .     Hyperglycemia     Pt reports that she is out of metformin 500mg & has had elevated CBGs in the 300s.     Time seen by provider: 12:55 PM    This is a 64 y.o. female with h/o DM who presents with complaint of nasal congestion. She has had congestion, rhinorrhea, post nasal drip, mild cough, earache,  and sinus pain/pressure for two weeks. She reports subjective fever and chills. She has had worsening sinus pressure since yesterday. She reports eye watering with bending over that began today. She has had similar episodes with sinus problems and seasonal allergies in the past. She has not taken decongestants or any OTC medications. She has felt fatigued since she ran out of metformin BID about one week ago. Her blood sugar is in the 200s at baseline. She reports mild abdominal pain, now resolved. No vomiting or diarrhea.    The history is provided by the patient.     Review of patient's allergies indicates:  No Known Allergies  Past Medical History:   Diagnosis Date    Diabetes mellitus      Past Surgical History:   Procedure Laterality Date    HYSTERECTOMY       No family history on file.  Social History     Tobacco Use    Smoking status: Never Smoker    Smokeless tobacco: Never Used   Substance Use Topics    Alcohol use: No    Drug use: No     Review of Systems   Constitutional: Positive for chills, fatigue and fever.   HENT: Positive for congestion, postnasal drip, rhinorrhea, sinus pressure and sinus pain. Negative for sore throat.    Eyes: Positive for discharge.   Respiratory: Positive for cough. Negative for shortness of breath.    Cardiovascular: Negative for chest pain.    Gastrointestinal: Positive for abdominal pain (resolved). Negative for diarrhea, nausea and vomiting.   Genitourinary: Negative for dysuria.   Musculoskeletal: Positive for neck pain. Negative for back pain.   Skin: Negative for rash.   Neurological: Negative for weakness.       Physical Exam     Initial Vitals [03/08/20 1157]   BP Pulse Resp Temp SpO2   135/69 (!) 56 18 98 °F (36.7 °C) 100 %      MAP       --         Physical Exam    Nursing note and vitals reviewed.  Constitutional: She appears well-developed and well-nourished. She is not diaphoretic. No distress.   HENT:   Head: Normocephalic and atraumatic.   Right Ear: Tympanic membrane normal.   Left Ear: Tympanic membrane normal.   Nose: Right sinus exhibits maxillary sinus tenderness and frontal sinus tenderness. Left sinus exhibits maxillary sinus tenderness and frontal sinus tenderness.   Mouth/Throat: Oropharynx is clear and moist. Mucous membranes are dry.   Eyes: Conjunctivae and EOM are normal. Pupils are equal, round, and reactive to light. No scleral icterus.   Neck: Normal range of motion. Neck supple.   Cardiovascular: Normal rate, regular rhythm and normal heart sounds. Exam reveals no gallop and no friction rub.    No murmur heard.  Pulmonary/Chest: Breath sounds normal. No respiratory distress. She has no wheezes. She has no rhonchi. She has no rales.   Abdominal: Soft. There is no tenderness. There is no rebound.   Musculoskeletal: Normal range of motion. She exhibits no edema or tenderness.   Neurological: She is alert and oriented to person, place, and time. She has normal strength. No cranial nerve deficit.   Skin: Skin is warm and dry.   Psychiatric: She has a normal mood and affect. Her behavior is normal. Judgment and thought content normal.         ED Course   Procedures  Labs Reviewed   POCT GLUCOSE - Abnormal; Notable for the following components:       Result Value    POCT Glucose 256 (*)     All other components within normal  limits   POCT GLUCOSE MONITORING CONTINUOUS          Imaging Results    None          Medical Decision Making:   History:   Old Medical Records: I decided to obtain old medical records.  Initial Assessment:       64-year-old female with history of diabetes presents with sinus congestion along with postnasal drip, cough, ear pain/pressure, chills, worsening for the past 2 weeks, with some eye watering today.  She has not taken any OTC meds for this yet.  Patient also has run out of her metformin for weeks, with some polyuria and polydipsia and fatigue since then.  On exam she has bilateral maxillary and frontal sinus tenderness, though she is afebrile and otherwise well appearing.  She has normal lung exam with no clinical sign of pneumonia, and no sign of strep throat or otitis media.  She does have dry mucous membranes but no tachycardia or other signs of severe dehydration.    Given 2 weeks of sinus symptoms with no improvement, concern for bacterial sinusitis.  Will treat empirically for this with amoxicillin, and patient will also start medications to cover potential allergic etiology including Flonase and Zyrtec.  Fingerstick in ; per patient her baseline fingersticks run in the 200s.  She is only on metformin 500 b.i.d., and symptoms or exam findings to suggest DKA or HHS.  Will give patient a months refill of metformin and she will follow up with PCP for reassessment and possible medication adjustment with HA1c.  Patient has no history of hypertension so was given 1 time dose of Sudafed in ED, but did not want to take metformin on empty stomach, she will take it when she fills her prescription.  She is comfortable with discharge plan and will return to the ED for any worsening symptoms or other concerns, and understands to closely follow with PCP.          Clinical Tests:   Lab Tests: Reviewed            Scribe Attestation:   Scribe #1: I performed the above scribed service and the documentation  accurately describes the services I performed. I attest to the accuracy of the note.    Attending Attestation:           Physician Attestation for Scribe:  Physician Attestation Statement for Scribe #1: I, Dr. Olivo, reviewed documentation, as scribed by Felicia Sigala in my presence, and it is both accurate and complete.                               Clinical Impression:     1. Subacute sinusitis, unspecified location              ED Disposition Condition    Discharge Stable        ED Prescriptions     Medication Sig Dispense Start Date End Date Auth. Provider    metFORMIN (GLUCOPHAGE) 500 MG tablet Take 1 tablet (500 mg total) by mouth 2 (two) times daily with meals. 60 tablet 3/8/2020  Ward Olivo MD    amoxicillin (AMOXIL) 875 MG tablet Take 1 tablet (875 mg total) by mouth 2 (two) times daily. 12 tablet 3/8/2020  Ward Olivo MD    cetirizine (ZYRTEC) 10 MG tablet Take 1 tablet (10 mg total) by mouth once daily. 30 tablet 3/8/2020 3/8/2021 Ward Olivo MD    fluticasone propionate (FLONASE) 50 mcg/actuation nasal spray 1 spray (50 mcg total) by Each Nostril route 2 (two) times daily as needed for Rhinitis. 15 g 3/8/2020  Ward Olivo MD        Follow-up Information     Follow up With Specialties Details Why Contact Info    Primary Doctor No  Schedule an appointment as soon as possible for a visit in 1 week      [facility] Go to  If symptoms worsen [address]  [facility] [id number] [phone]                                     Ward Olivo MD  03/11/20 6385

## 2020-03-08 NOTE — ED TRIAGE NOTES
Pt reports sinus congestion x 1 wk. Reports uncontrollable eye watering with facial pressure with leaning over. Reports facial swelling. Reports bilateral ear pain.

## 2020-03-18 ENCOUNTER — HOSPITAL ENCOUNTER (INPATIENT)
Facility: OTHER | Age: 65
LOS: 5 days | Discharge: HOME | DRG: 246 | End: 2020-03-23
Attending: EMERGENCY MEDICINE | Admitting: HOSPITALIST
Payer: MEDICAID

## 2020-03-18 DIAGNOSIS — R79.89 ELEVATED TROPONIN: ICD-10-CM

## 2020-03-18 DIAGNOSIS — I24.9 ACUTE CORONARY SYNDROME: ICD-10-CM

## 2020-03-18 DIAGNOSIS — R07.9 CHEST PAIN: ICD-10-CM

## 2020-03-18 DIAGNOSIS — I21.4 NSTEMI (NON-ST ELEVATED MYOCARDIAL INFARCTION): ICD-10-CM

## 2020-03-18 DIAGNOSIS — I21.4 NON-ST ELEVATED MYOCARDIAL INFARCTION (NON-STEMI): Primary | ICD-10-CM

## 2020-03-18 DIAGNOSIS — R73.9 HYPERGLYCEMIA: ICD-10-CM

## 2020-03-18 PROCEDURE — 99285 EMERGENCY DEPT VISIT HI MDM: CPT | Mod: 25

## 2020-03-18 PROCEDURE — 12000002 HC ACUTE/MED SURGE SEMI-PRIVATE ROOM

## 2020-03-18 PROCEDURE — 93010 ELECTROCARDIOGRAM REPORT: CPT | Mod: ,,, | Performed by: INTERNAL MEDICINE

## 2020-03-18 PROCEDURE — 93005 ELECTROCARDIOGRAM TRACING: CPT

## 2020-03-18 PROCEDURE — 82962 GLUCOSE BLOOD TEST: CPT

## 2020-03-18 PROCEDURE — 93010 EKG 12-LEAD: ICD-10-PCS | Mod: ,,, | Performed by: INTERNAL MEDICINE

## 2020-03-18 NOTE — Clinical Note
Catheter is inserted into the and is removed from the ostium   right coronary artery. Angiography performed of the right coronary arteries.

## 2020-03-18 NOTE — Clinical Note
Catheter is inserted into the and is removed from the ostium   left main. Angiography performed of the left coronary arteries.

## 2020-03-18 NOTE — Clinical Note
The sheath is removed from the right femoral artery retrograde. 6Fr sheath removal in the recovery room at 14:07. Held pressure for 20 min. No hematoma seen. Tegadrem was applied to site. Recovery room nurse checked pulse and site.

## 2020-03-18 NOTE — Clinical Note
Catheter is removed from the ostium   right coronary artery. Angiography performed post intervention .

## 2020-03-18 NOTE — Clinical Note
Catheter is removed from the. Angiography performed post intervention of the left coronary arteries.

## 2020-03-19 PROBLEM — E11.65 TYPE 2 DIABETES MELLITUS WITH HYPERGLYCEMIA, WITHOUT LONG-TERM CURRENT USE OF INSULIN: Status: ACTIVE | Noted: 2020-03-19

## 2020-03-19 PROBLEM — I21.4 NSTEMI (NON-ST ELEVATED MYOCARDIAL INFARCTION): Status: ACTIVE | Noted: 2020-03-19

## 2020-03-19 LAB
ANION GAP SERPL CALC-SCNC: 10 MMOL/L (ref 8–16)
ANION GAP SERPL CALC-SCNC: 8 MMOL/L (ref 8–16)
AV INDEX (PROSTH): 0.79
AV MEAN GRADIENT: 4 MMHG
AV PEAK GRADIENT: 5 MMHG
AV VALVE AREA: 2.08 CM2
AV VELOCITY RATIO: 0.85
BASOPHILS # BLD AUTO: 0.04 K/UL (ref 0–0.2)
BASOPHILS # BLD AUTO: 0.04 K/UL (ref 0–0.2)
BASOPHILS NFR BLD: 0.5 % (ref 0–1.9)
BASOPHILS NFR BLD: 0.6 % (ref 0–1.9)
BSA FOR ECHO PROCEDURE: 1.78 M2
BUN SERPL-MCNC: 11 MG/DL (ref 8–23)
BUN SERPL-MCNC: 16 MG/DL (ref 8–23)
CALCIUM SERPL-MCNC: 10 MG/DL (ref 8.7–10.5)
CALCIUM SERPL-MCNC: 10.5 MG/DL (ref 8.7–10.5)
CHLORIDE SERPL-SCNC: 102 MMOL/L (ref 95–110)
CHLORIDE SERPL-SCNC: 98 MMOL/L (ref 95–110)
CHOLEST SERPL-MCNC: 171 MG/DL (ref 120–199)
CHOLEST/HDLC SERPL: 2.9 {RATIO} (ref 2–5)
CO2 SERPL-SCNC: 26 MMOL/L (ref 23–29)
CO2 SERPL-SCNC: 26 MMOL/L (ref 23–29)
CREAT SERPL-MCNC: 0.8 MG/DL (ref 0.5–1.4)
CREAT SERPL-MCNC: 1 MG/DL (ref 0.5–1.4)
CV ECHO LV RWT: 0.32 CM
DIFFERENTIAL METHOD: ABNORMAL
DIFFERENTIAL METHOD: ABNORMAL
DOP CALC AO PEAK VEL: 1.17 M/S
DOP CALC AO VTI: 26.63 CM
DOP CALC LVOT AREA: 2.6 CM2
DOP CALC LVOT DIAMETER: 1.83 CM
DOP CALC LVOT PEAK VEL: 1 M/S
DOP CALC LVOT STROKE VOLUME: 55.26 CM3
DOP CALCLVOT PEAK VEL VTI: 21.02 CM
E WAVE DECELERATION TIME: 192.8 MSEC
E/A RATIO: 1.45
E/E' RATIO: 14.31 M/S
ECHO LV POSTERIOR WALL: 0.75 CM (ref 0.6–1.1)
EOSINOPHIL # BLD AUTO: 0.2 K/UL (ref 0–0.5)
EOSINOPHIL # BLD AUTO: 0.3 K/UL (ref 0–0.5)
EOSINOPHIL NFR BLD: 2.5 % (ref 0–8)
EOSINOPHIL NFR BLD: 3.1 % (ref 0–8)
ERYTHROCYTE [DISTWIDTH] IN BLOOD BY AUTOMATED COUNT: 13 % (ref 11.5–14.5)
ERYTHROCYTE [DISTWIDTH] IN BLOOD BY AUTOMATED COUNT: 13 % (ref 11.5–14.5)
EST. GFR  (AFRICAN AMERICAN): >60 ML/MIN/1.73 M^2
EST. GFR  (AFRICAN AMERICAN): >60 ML/MIN/1.73 M^2
EST. GFR  (NON AFRICAN AMERICAN): >60 ML/MIN/1.73 M^2
EST. GFR  (NON AFRICAN AMERICAN): >60 ML/MIN/1.73 M^2
ESTIMATED AVG GLUCOSE: 332 MG/DL (ref 68–131)
FRACTIONAL SHORTENING: 32 % (ref 28–44)
GLUCOSE SERPL-MCNC: 255 MG/DL (ref 70–110)
GLUCOSE SERPL-MCNC: 515 MG/DL (ref 70–110)
HBA1C MFR BLD HPLC: 13.2 % (ref 4–5.6)
HCT VFR BLD AUTO: 43.1 % (ref 37–48.5)
HCT VFR BLD AUTO: 44 % (ref 37–48.5)
HDLC SERPL-MCNC: 58 MG/DL (ref 40–75)
HDLC SERPL: 33.9 % (ref 20–50)
HGB BLD-MCNC: 13.9 G/DL (ref 12–16)
HGB BLD-MCNC: 14.4 G/DL (ref 12–16)
IMM GRANULOCYTES # BLD AUTO: 0.01 K/UL (ref 0–0.04)
IMM GRANULOCYTES # BLD AUTO: 0.02 K/UL (ref 0–0.04)
IMM GRANULOCYTES NFR BLD AUTO: 0.1 % (ref 0–0.5)
IMM GRANULOCYTES NFR BLD AUTO: 0.2 % (ref 0–0.5)
INTERVENTRICULAR SEPTUM: 0.79 CM (ref 0.6–1.1)
LA MAJOR: 4.88 CM
LA MINOR: 5.1 CM
LA WIDTH: 4.26 CM
LDLC SERPL CALC-MCNC: 101.2 MG/DL (ref 63–159)
LEFT ATRIUM SIZE: 3.71 CM
LEFT ATRIUM VOLUME INDEX MOD: 42.19 ML/M2
LEFT ATRIUM VOLUME INDEX: 38.2 ML/M2
LEFT ATRIUM VOLUME: 67 CM3
LEFT INTERNAL DIMENSION IN SYSTOLE: 3.14 CM (ref 2.1–4)
LEFT VENTRICLE DIASTOLIC VOLUME INDEX: 56.92 ML/M2
LEFT VENTRICLE DIASTOLIC VOLUME: 99.86 ML
LEFT VENTRICLE MASS INDEX: 65 G/M2
LEFT VENTRICLE SYSTOLIC VOLUME INDEX: 22.3 ML/M2
LEFT VENTRICLE SYSTOLIC VOLUME: 39.06 ML
LEFT VENTRICULAR INTERNAL DIMENSION IN DIASTOLE: 4.65 CM (ref 3.5–6)
LEFT VENTRICULAR MASS: 114.28 G
LV LATERAL E/E' RATIO: 11.63 M/S
LV SEPTAL E/E' RATIO: 18.6 M/S
LYMPHOCYTES # BLD AUTO: 2.6 K/UL (ref 1–4.8)
LYMPHOCYTES # BLD AUTO: 3.4 K/UL (ref 1–4.8)
LYMPHOCYTES NFR BLD: 38.7 % (ref 18–48)
LYMPHOCYTES NFR BLD: 41.6 % (ref 18–48)
MCH RBC QN AUTO: 26.2 PG (ref 27–31)
MCH RBC QN AUTO: 26.6 PG (ref 27–31)
MCHC RBC AUTO-ENTMCNC: 32.3 G/DL (ref 32–36)
MCHC RBC AUTO-ENTMCNC: 32.7 G/DL (ref 32–36)
MCV RBC AUTO: 81 FL (ref 82–98)
MCV RBC AUTO: 81 FL (ref 82–98)
MONOCYTES # BLD AUTO: 0.6 K/UL (ref 0.3–1)
MONOCYTES # BLD AUTO: 0.7 K/UL (ref 0.3–1)
MONOCYTES NFR BLD: 8 % (ref 4–15)
MONOCYTES NFR BLD: 8.3 % (ref 4–15)
MV PEAK A VEL: 0.64 M/S
MV PEAK E VEL: 0.93 M/S
MV STENOSIS PRESSURE HALF TIME: 56 MS
MV VALVE AREA P 1/2 METHOD: 3.93 CM2
NEUTROPHILS # BLD AUTO: 3.4 K/UL (ref 1.8–7.7)
NEUTROPHILS # BLD AUTO: 3.8 K/UL (ref 1.8–7.7)
NEUTROPHILS NFR BLD: 46.6 % (ref 38–73)
NEUTROPHILS NFR BLD: 49.8 % (ref 38–73)
NONHDLC SERPL-MCNC: 113 MG/DL
NRBC BLD-RTO: 0 /100 WBC
NRBC BLD-RTO: 0 /100 WBC
PISA TR MAX VEL: 2.56 M/S
PLATELET # BLD AUTO: 159 K/UL (ref 150–350)
PLATELET # BLD AUTO: 160 K/UL (ref 150–350)
PMV BLD AUTO: 11.7 FL (ref 9.2–12.9)
PMV BLD AUTO: 12.1 FL (ref 9.2–12.9)
POCT GLUCOSE: 215 MG/DL (ref 70–110)
POCT GLUCOSE: 222 MG/DL (ref 70–110)
POCT GLUCOSE: 266 MG/DL (ref 70–110)
POCT GLUCOSE: 277 MG/DL (ref 70–110)
POCT GLUCOSE: 330 MG/DL (ref 70–110)
POCT GLUCOSE: 377 MG/DL (ref 70–110)
POCT GLUCOSE: 486 MG/DL (ref 70–110)
POTASSIUM SERPL-SCNC: 3.8 MMOL/L (ref 3.5–5.1)
POTASSIUM SERPL-SCNC: 4.3 MMOL/L (ref 3.5–5.1)
PULM VEIN S/D RATIO: 1.21
PV PEAK D VEL: 0.48 M/S
PV PEAK S VEL: 0.58 M/S
PV PEAK VELOCITY: 1.11 CM/S
RA MAJOR: 3.66 CM
RA PRESSURE: 3 MMHG
RA WIDTH: 3.01 CM
RBC # BLD AUTO: 5.31 M/UL (ref 4–5.4)
RBC # BLD AUTO: 5.42 M/UL (ref 4–5.4)
SINUS: 2.15 CM
SODIUM SERPL-SCNC: 134 MMOL/L (ref 136–145)
SODIUM SERPL-SCNC: 136 MMOL/L (ref 136–145)
STJ: 1.83 CM
TDI LATERAL: 0.08 M/S
TDI SEPTAL: 0.05 M/S
TDI: 0.07 M/S
TR MAX PG: 26 MMHG
TRICUSPID ANNULAR PLANE SYSTOLIC EXCURSION: 1.69 CM
TRIGL SERPL-MCNC: 59 MG/DL (ref 30–150)
TROPONIN I SERPL DL<=0.01 NG/ML-MCNC: 0.68 NG/ML (ref 0–0.03)
TROPONIN I SERPL DL<=0.01 NG/ML-MCNC: 1.55 NG/ML (ref 0–0.03)
TROPONIN I SERPL DL<=0.01 NG/ML-MCNC: 2.16 NG/ML (ref 0–0.03)
TV REST PULMONARY ARTERY PRESSURE: 29 MMHG
WBC # BLD AUTO: 6.75 K/UL (ref 3.9–12.7)
WBC # BLD AUTO: 8.15 K/UL (ref 3.9–12.7)

## 2020-03-19 PROCEDURE — C9399 UNCLASSIFIED DRUGS OR BIOLOG: HCPCS | Performed by: HOSPITALIST

## 2020-03-19 PROCEDURE — 94761 N-INVAS EAR/PLS OXIMETRY MLT: CPT

## 2020-03-19 PROCEDURE — 80048 BASIC METABOLIC PNL TOTAL CA: CPT | Mod: 91

## 2020-03-19 PROCEDURE — 80048 BASIC METABOLIC PNL TOTAL CA: CPT

## 2020-03-19 PROCEDURE — 25000003 PHARM REV CODE 250: Performed by: EMERGENCY MEDICINE

## 2020-03-19 PROCEDURE — 63600175 PHARM REV CODE 636 W HCPCS: Performed by: HOSPITALIST

## 2020-03-19 PROCEDURE — 83036 HEMOGLOBIN GLYCOSYLATED A1C: CPT

## 2020-03-19 PROCEDURE — 11000001 HC ACUTE MED/SURG PRIVATE ROOM

## 2020-03-19 PROCEDURE — 93010 EKG 12-LEAD: ICD-10-PCS | Mod: ,,, | Performed by: INTERNAL MEDICINE

## 2020-03-19 PROCEDURE — 63600175 PHARM REV CODE 636 W HCPCS: Performed by: PHYSICIAN ASSISTANT

## 2020-03-19 PROCEDURE — 25000003 PHARM REV CODE 250: Performed by: INTERNAL MEDICINE

## 2020-03-19 PROCEDURE — 63600175 PHARM REV CODE 636 W HCPCS: Performed by: INTERNAL MEDICINE

## 2020-03-19 PROCEDURE — 36415 COLL VENOUS BLD VENIPUNCTURE: CPT

## 2020-03-19 PROCEDURE — 93005 ELECTROCARDIOGRAM TRACING: CPT

## 2020-03-19 PROCEDURE — 84484 ASSAY OF TROPONIN QUANT: CPT

## 2020-03-19 PROCEDURE — 80061 LIPID PANEL: CPT

## 2020-03-19 PROCEDURE — 63600175 PHARM REV CODE 636 W HCPCS: Performed by: EMERGENCY MEDICINE

## 2020-03-19 PROCEDURE — 93010 ELECTROCARDIOGRAM REPORT: CPT | Mod: ,,, | Performed by: INTERNAL MEDICINE

## 2020-03-19 PROCEDURE — 84484 ASSAY OF TROPONIN QUANT: CPT | Mod: 91

## 2020-03-19 PROCEDURE — 99223 PR INITIAL HOSPITAL CARE,LEVL III: ICD-10-PCS | Mod: ,,, | Performed by: PHYSICIAN ASSISTANT

## 2020-03-19 PROCEDURE — 85025 COMPLETE CBC W/AUTO DIFF WBC: CPT

## 2020-03-19 PROCEDURE — 99223 1ST HOSP IP/OBS HIGH 75: CPT | Mod: ,,, | Performed by: PHYSICIAN ASSISTANT

## 2020-03-19 RX ORDER — CLOPIDOGREL BISULFATE 75 MG/1
75 TABLET ORAL DAILY
Status: DISCONTINUED | OUTPATIENT
Start: 2020-03-20 | End: 2020-03-23 | Stop reason: HOSPADM

## 2020-03-19 RX ORDER — SODIUM CHLORIDE 9 MG/ML
1000 INJECTION, SOLUTION INTRAVENOUS
Status: COMPLETED | OUTPATIENT
Start: 2020-03-19 | End: 2020-03-19

## 2020-03-19 RX ORDER — ASPIRIN 325 MG
325 TABLET, DELAYED RELEASE (ENTERIC COATED) ORAL
Status: COMPLETED | OUTPATIENT
Start: 2020-03-19 | End: 2020-03-19

## 2020-03-19 RX ORDER — IBUPROFEN 200 MG
24 TABLET ORAL
Status: DISCONTINUED | OUTPATIENT
Start: 2020-03-19 | End: 2020-03-23 | Stop reason: HOSPADM

## 2020-03-19 RX ORDER — ONDANSETRON 2 MG/ML
4 INJECTION INTRAMUSCULAR; INTRAVENOUS EVERY 8 HOURS PRN
Status: DISCONTINUED | OUTPATIENT
Start: 2020-03-19 | End: 2020-03-23 | Stop reason: HOSPADM

## 2020-03-19 RX ORDER — ATORVASTATIN CALCIUM 20 MG/1
40 TABLET, FILM COATED ORAL DAILY
Status: DISCONTINUED | OUTPATIENT
Start: 2020-03-19 | End: 2020-03-20

## 2020-03-19 RX ORDER — SODIUM CHLORIDE 0.9 % (FLUSH) 0.9 %
10 SYRINGE (ML) INJECTION
Status: DISCONTINUED | OUTPATIENT
Start: 2020-03-19 | End: 2020-03-19

## 2020-03-19 RX ORDER — ACETAMINOPHEN 325 MG/1
650 TABLET ORAL EVERY 4 HOURS PRN
Status: DISCONTINUED | OUTPATIENT
Start: 2020-03-19 | End: 2020-03-23 | Stop reason: HOSPADM

## 2020-03-19 RX ORDER — NITROGLYCERIN 0.4 MG/1
0.4 TABLET SUBLINGUAL EVERY 5 MIN PRN
Status: DISCONTINUED | OUTPATIENT
Start: 2020-03-19 | End: 2020-03-23 | Stop reason: HOSPADM

## 2020-03-19 RX ORDER — NITROGLYCERIN 80 MG/1
1 PATCH TRANSDERMAL DAILY
Status: DISCONTINUED | OUTPATIENT
Start: 2020-03-19 | End: 2020-03-20

## 2020-03-19 RX ORDER — ATORVASTATIN CALCIUM 20 MG/1
20 TABLET, FILM COATED ORAL NIGHTLY
Status: DISCONTINUED | OUTPATIENT
Start: 2020-03-19 | End: 2020-03-19

## 2020-03-19 RX ORDER — HEPARIN SODIUM 5000 [USP'U]/ML
5000 INJECTION, SOLUTION INTRAVENOUS; SUBCUTANEOUS EVERY 8 HOURS
Status: COMPLETED | OUTPATIENT
Start: 2020-03-19 | End: 2020-03-19

## 2020-03-19 RX ORDER — IBUPROFEN 200 MG
16 TABLET ORAL
Status: DISCONTINUED | OUTPATIENT
Start: 2020-03-19 | End: 2020-03-23 | Stop reason: HOSPADM

## 2020-03-19 RX ORDER — NITROGLYCERIN 0.4 MG/1
0.4 TABLET SUBLINGUAL
Status: DISPENSED | OUTPATIENT
Start: 2020-03-19 | End: 2020-03-19

## 2020-03-19 RX ORDER — GLUCAGON 1 MG
1 KIT INJECTION
Status: DISCONTINUED | OUTPATIENT
Start: 2020-03-19 | End: 2020-03-23 | Stop reason: HOSPADM

## 2020-03-19 RX ORDER — CLOPIDOGREL 300 MG/1
600 TABLET, FILM COATED ORAL ONCE
Status: COMPLETED | OUTPATIENT
Start: 2020-03-19 | End: 2020-03-19

## 2020-03-19 RX ORDER — ASPIRIN 81 MG/1
81 TABLET ORAL DAILY
Status: DISCONTINUED | OUTPATIENT
Start: 2020-03-20 | End: 2020-03-23 | Stop reason: HOSPADM

## 2020-03-19 RX ORDER — INSULIN ASPART 100 [IU]/ML
0-5 INJECTION, SOLUTION INTRAVENOUS; SUBCUTANEOUS
Status: DISCONTINUED | OUTPATIENT
Start: 2020-03-19 | End: 2020-03-23 | Stop reason: HOSPADM

## 2020-03-19 RX ORDER — SODIUM CHLORIDE 0.9 % (FLUSH) 0.9 %
10 SYRINGE (ML) INJECTION
Status: DISCONTINUED | OUTPATIENT
Start: 2020-03-19 | End: 2020-03-23 | Stop reason: HOSPADM

## 2020-03-19 RX ADMIN — INSULIN ASPART 3 UNITS: 100 INJECTION, SOLUTION INTRAVENOUS; SUBCUTANEOUS at 12:03

## 2020-03-19 RX ADMIN — NITROGLYCERIN 1 PATCH: 0.4 PATCH TRANSDERMAL at 04:03

## 2020-03-19 RX ADMIN — CLOPIDOGREL BISULFATE 600 MG: 300 TABLET, FILM COATED ORAL at 06:03

## 2020-03-19 RX ADMIN — INSULIN ASPART 2 UNITS: 100 INJECTION, SOLUTION INTRAVENOUS; SUBCUTANEOUS at 03:03

## 2020-03-19 RX ADMIN — ASPIRIN 325 MG: 325 TABLET, DELAYED RELEASE ORAL at 01:03

## 2020-03-19 RX ADMIN — SODIUM CHLORIDE 1000 ML: 0.9 INJECTION, SOLUTION INTRAVENOUS at 01:03

## 2020-03-19 RX ADMIN — INSULIN ASPART 3 UNITS: 100 INJECTION, SOLUTION INTRAVENOUS; SUBCUTANEOUS at 09:03

## 2020-03-19 RX ADMIN — INSULIN DETEMIR 5 UNITS: 100 INJECTION, SOLUTION SUBCUTANEOUS at 11:03

## 2020-03-19 RX ADMIN — INSULIN ASPART 1 UNITS: 100 INJECTION, SOLUTION INTRAVENOUS; SUBCUTANEOUS at 09:03

## 2020-03-19 RX ADMIN — HEPARIN SODIUM 5000 UNITS: 5000 INJECTION, SOLUTION INTRAVENOUS; SUBCUTANEOUS at 01:03

## 2020-03-19 RX ADMIN — HEPARIN SODIUM 5000 UNITS: 5000 INJECTION, SOLUTION INTRAVENOUS; SUBCUTANEOUS at 09:03

## 2020-03-19 RX ADMIN — INSULIN ASPART 2 UNITS: 100 INJECTION, SOLUTION INTRAVENOUS; SUBCUTANEOUS at 05:03

## 2020-03-19 RX ADMIN — HEPARIN SODIUM 5000 UNITS: 5000 INJECTION, SOLUTION INTRAVENOUS; SUBCUTANEOUS at 06:03

## 2020-03-19 RX ADMIN — ATORVASTATIN CALCIUM 40 MG: 20 TABLET, FILM COATED ORAL at 04:03

## 2020-03-19 NOTE — ED PROVIDER NOTES
"Encounter Date: 3/18/2020    SCRIBE #1 NOTE: I, Esthlea Pressley, am scribing for, and in the presence of, Dr. Andino.       History     Chief Complaint   Patient presents with    Chest Pain     pt has c/o shortness of breath, mid sternal chest pain and sinus congestion      Time seen by provider: 11:29 PM    This is a 64 y.o. female who presents with complaint of chest pain that has been intermittent for the past week. She reports initial onset of pain to both sides of her face near her jaw described at "tightness" that radiates down the mid-chest. She states that episodes are intermittent and last about one hour. Patient states that the episode tonight began about 2.5 hours ago and lasted until she arrived to the ED; pain was present for over two hours. She reports onset of pain with both exertion and rest. She states that episodes resolve on their own. Patient denies any alleviating or exacerbating factors. She denies any associated nausea, vomiting, diaphoresis, weakness, or SOB. Patient notes that she was recently treated for a sinus infection and told that face pain was possibly related to that.    The history is provided by the patient.     Review of patient's allergies indicates:  No Known Allergies  Past Medical History:   Diagnosis Date    Diabetes mellitus      Past Surgical History:   Procedure Laterality Date    HYSTERECTOMY       History reviewed. No pertinent family history.  Social History     Tobacco Use    Smoking status: Never Smoker    Smokeless tobacco: Never Used   Substance Use Topics    Alcohol use: No    Drug use: No     Review of Systems   Constitutional: Negative for chills, diaphoresis and fever.   HENT: Negative for congestion and sore throat.         Positive for jaw "tightness."   Respiratory: Negative for cough and shortness of breath.    Cardiovascular: Positive for chest pain.   Gastrointestinal: Negative for abdominal pain, diarrhea, nausea and vomiting.   Genitourinary: " Negative for dysuria.   Musculoskeletal: Negative for back pain and myalgias.   Skin: Negative for rash.   Neurological: Negative for weakness.   Hematological: Does not bruise/bleed easily.       Physical Exam     Initial Vitals [03/18/20 2309]   BP Pulse Resp Temp SpO2   (!) 196/89 85 18 97.8 °F (36.6 °C) 97 %      MAP       --         Physical Exam    Nursing note and vitals reviewed.  Constitutional: She appears well-developed and well-nourished. She is cooperative.  Non-toxic appearance. No distress.   HENT:   Head: Normocephalic and atraumatic.   Right Ear: Tympanic membrane and external ear normal.   Left Ear: Tympanic membrane and external ear normal.   Mouth/Throat: Oropharynx is clear and moist.   Eyes: Conjunctivae and EOM are normal. Pupils are equal, round, and reactive to light.   Neck: Normal range of motion and full passive range of motion without pain. Neck supple. No thyromegaly present.   Cardiovascular: Normal rate, regular rhythm, normal heart sounds and normal pulses.   Pulmonary/Chest: Effort normal and breath sounds normal. No respiratory distress. She exhibits no tenderness.   Abdominal: Soft. Normal appearance and bowel sounds are normal. There is no tenderness.   Musculoskeletal: Normal range of motion.   Lymphadenopathy:     She has no cervical adenopathy.   Neurological: She is alert and oriented to person, place, and time. She has normal strength. No cranial nerve deficit or sensory deficit.   Skin: Skin is warm, dry and intact. No rash noted.   Psychiatric: She has a normal mood and affect. Her speech is normal and behavior is normal. Judgment and thought content normal.         ED Course   Procedures  Labs Reviewed   TROPONIN I - Abnormal; Notable for the following components:       Result Value    Troponin I 0.684 (*)     All other components within normal limits   BASIC METABOLIC PANEL - Abnormal; Notable for the following components:    Sodium 134 (*)     Glucose 515 (*)     All  other components within normal limits    Narrative:        GLU critical result(s) called and verbal readback obtained from   Sariah Gomez RN by KTT 03/19/2020 01:06   CBC W/ AUTO DIFFERENTIAL - Abnormal; Notable for the following components:    RBC 5.42 (*)     Mean Corpuscular Volume 81 (*)     Mean Corpuscular Hemoglobin 26.6 (*)     All other components within normal limits   POCT GLUCOSE - Abnormal; Notable for the following components:    POCT Glucose 486 (*)     All other components within normal limits   POCT GLUCOSE - Abnormal; Notable for the following components:    POCT Glucose 377 (*)     All other components within normal limits     EKG Readings: (Independently Interpreted)   NSR rate of 77 bpm. Less than 1 mm ST depression in V3 through V6. Normal intervals. No new ST elevations.       Imaging Results          X-Ray Chest PA And Lateral (Final result)  Result time 03/19/20 00:27:49    Final result by Mili Davis MD (03/19/20 00:27:49)                 Impression:      No acute intrathoracic abnormality.      Electronically signed by: Mili Davis  Date:    03/19/2020  Time:    00:27             Narrative:    EXAMINATION:  CHEST PA AND LATERAL    CLINICAL HISTORY:  Chest pain, unspecified    TECHNIQUE:  PA and lateral chest radiograph    COMPARISON:  03/23/2007    FINDINGS:  The cardiac silhouette is within normal limits.   There is no focal consolidation, pneumothorax, or pleural effusion.                                 Medical Decision Making:   History:   Old Medical Records: I decided to obtain old medical records.  Independently Interpreted Test(s):   I have ordered and independently interpreted X-rays - see prior notes.  I have ordered and independently interpreted EKG Reading(s) - see prior notes  Clinical Tests:   Lab Tests: Reviewed and Ordered  Radiological Study: Ordered and Reviewed  Medical Tests: Ordered and Reviewed    Additional MDM:   Comments:  64-year-old female presents  complaining of intermittent chest tightness over the past week.  She did report an episode tonight which resolved after she arrived in the emergency department.  The time of my assessment the patient was asymptomatic.  Initial EKG showed no significant changes.  Troponin was obtained at approximately  3 hr after the onset of pain  And it was elevated.  Upon ambulation to the restroom she  Did have a repeat episode of chest pain which resolved without any interventions.  She did receive aspirin in the emergency department as well as IV fluids.  Insulin was ordered but not given as her blood glucose did improve with fluids alone.  The case was discussed with Gabbi Mejia the patient was admitted the hospitalist service..          Scribe Attestation:   Scribe #1: I performed the above scribed service and the documentation accurately describes the services I performed. I attest to the accuracy of the note.    Attending Attestation:           Physician Attestation for Scribe:  Physician Attestation Statement for Scribe #1: I, Dr. Andino, reviewed documentation, as scribed by Esthela Pressley in my presence, and it is both accurate and complete.                               Clinical Impression:     1. Chest pain    2. NSTEMI (non-ST elevated myocardial infarction)    3. Hyperglycemia                ED Disposition Condition    Admit                           Danika Andino MD  03/19/20 3971

## 2020-03-19 NOTE — HPI
"Ms. Jamee Purvis is a 64 y.o. female, with PMH of NIDDM-2, who presented to [facility] ED on 3/18/20 with intermittent chest pain x 1 week. She states the chest pain begins in the b/l sides of her jaw, and travels downward as a "tightness" to the mid chest. She states the pain usually lasts about 1/2 an hour. Tonight the pain began 2.5 hours PTA. She states the pain occurs both with exertion and during rest, resoving on it's own. She denies diaphoresis, SOB, N/V. She was evaluated in the ED, and EKG showed St changes in inferior leads. And elevated troponin of 0.684. She was admitted to inpatient status.   "

## 2020-03-19 NOTE — ASSESSMENT & PLAN NOTE
- Ms. Jamee Purvis is admitted to inpatient status  - she has unstable angina w/ elevated troponin, and inferior EKG changes   - monitor troponin  - EKGs w/ troponin draws  - Cardiology consulted   - HEART score: 5

## 2020-03-19 NOTE — NURSING
Pt Trop up trended to 1.555 from 0.65 per Shira, night nurse, and A1C resulted at 13. Dr. Montejo is aware. Cards consulted, paged Dr. Little. TM

## 2020-03-19 NOTE — ASSESSMENT & PLAN NOTE
- hyperglycemic in ED   - administered IV fluids    - insulin ordered, not administered    - CBG after fluids 377   - A1C pending for AM   - hold oral antidiabetic meds   - Diabetic diet   - SSI with accuchekcs AC/HS

## 2020-03-19 NOTE — CONSULTS
[facility]  Cardiology  Consult Note    Patient Name: Jamee Purvis  MRN: [id number]  Admission Date: 3/18/2020  Hospital Length of Stay: 0 days  Code Status: Full Code   Attending Provider: Mio Montejo MD   Consulting Provider: Robyn Little MD  Primary Care Physician: Primary Doctor No  Principal Problem:NSTEMI (non-ST elevated myocardial infarction)    Patient information was obtained from patient, past medical records and ER records.     Inpatient consult to Cardiology  Consult performed by: Robyn Little MD  Consult ordered by: Gabbi Mejia PA-C  Reason for consult: NSTEMI        Subjective:     Chief Complaint:  Chest pain.     HPI:    Jamee Purvis is a 64 y.o.female with diabetes mellitus, type 2. She is overweight. Beginning about 3/11/2020 she has felt jaw pain on and off with mild SOB. She thought she had a sinus issues and was seen in the ER. He occasional jaw pain persisted. In the evening of 3/18/2020 the jaw pain was followed by moderate chest tightness. She became concerned and presented to the ER. She was admitted.      Past Medical History:   Diagnosis Date    Diabetes mellitus        Past Surgical History:   Procedure Laterality Date    HYSTERECTOMY         Review of patient's allergies indicates:  No Known Allergies    No current facility-administered medications on file prior to encounter.      Current Outpatient Medications on File Prior to Encounter   Medication Sig    cetirizine (ZYRTEC) 10 MG tablet Take 1 tablet (10 mg total) by mouth once daily.    fluticasone propionate (FLONASE) 50 mcg/actuation nasal spray 1 spray (50 mcg total) by Each Nostril route 2 (two) times daily as needed for Rhinitis.    metFORMIN (GLUCOPHAGE) 500 MG tablet Take 1 tablet (500 mg total) by mouth 2 (two) times daily with meals.     Family History     None        Tobacco Use    Smoking status: Never Smoker    Smokeless tobacco: Never Used   Substance and Sexual  Activity    Alcohol use: No    Drug use: No    Sexual activity: Not on file     Review of Systems   Constitution: Negative for chills, fever and malaise/fatigue.   HENT: Positive for congestion. Negative for nosebleeds.    Eyes: Negative for double vision, vision loss in left eye and vision loss in right eye.   Cardiovascular: Positive for chest pain. Negative for claudication, dyspnea on exertion, irregular heartbeat, leg swelling, near-syncope, orthopnea, palpitations, paroxysmal nocturnal dyspnea and syncope.   Respiratory: Negative for cough, hemoptysis, shortness of breath and wheezing.    Endocrine: Negative for cold intolerance and heat intolerance.   Hematologic/Lymphatic: Negative for bleeding problem. Does not bruise/bleed easily.   Skin: Negative for color change and rash.   Musculoskeletal: Negative for back pain, falls, muscle weakness and myalgias.   Gastrointestinal: Negative for heartburn, hematemesis, hematochezia, hemorrhoids, jaundice, melena, nausea and vomiting.   Genitourinary: Negative for dysuria and hematuria.   Neurological: Negative for dizziness, focal weakness, headaches, light-headedness, loss of balance, numbness, vertigo and weakness.   Psychiatric/Behavioral: Negative for altered mental status, depression and memory loss. The patient is not nervous/anxious.    Allergic/Immunologic: Negative for hives and persistent infections.     Objective:     Vital Signs (Most Recent):  Temp: 97.4 °F (36.3 °C) (03/19/20 1204)  Pulse: 61 (03/19/20 1400)  Resp: 16 (03/19/20 1204)  BP: 122/67 (03/19/20 1204)  SpO2: 100 % (03/19/20 1204) Vital Signs (24h Range):  Temp:  [97.4 °F (36.3 °C)-98.7 °F (37.1 °C)] 97.4 °F (36.3 °C)  Pulse:  [54-85] 61  Resp:  [16-19] 16  SpO2:  [97 %-100 %] 100 %  BP: (122-196)/(66-89) 122/67     Weight: 70.2 kg (154 lb 13.2 oz)  Body mass index is 26.58 kg/m².    SpO2: 100 %  O2 Device (Oxygen Therapy): room air      Intake/Output Summary (Last 24 hours) at 3/19/2020  1601  Last data filed at 3/19/2020 0600  Gross per 24 hour   Intake 1120 ml   Output --   Net 1120 ml       Lines/Drains/Airways     Peripheral Intravenous Line                 Peripheral IV - Single Lumen 03/19/20 0125 22 G Left Antecubital less than 1 day                Physical Exam   Constitutional: She is oriented to person, place, and time. She appears well-developed and well-nourished.  Non-toxic appearance. No distress.   HENT:   Head: Normocephalic and atraumatic.   Nose: Nose normal.   Eyes: Right eye exhibits no discharge. Left eye exhibits no discharge. Right conjunctiva is not injected. Left conjunctiva is not injected. Right pupil is round. Left pupil is round. Pupils are equal.   Neck: Neck supple. No JVD present. Carotid bruit is not present. No thyromegaly present.   Cardiovascular: Normal rate, regular rhythm, S1 normal and S2 normal.  No extrasystoles are present. PMI is not displaced. Exam reveals gallop and S4.   Murmur heard.   Systolic murmur is present with a grade of 2/6 at the lower right sternal border.  Pulses:       Radial pulses are 2+ on the right side, and 2+ on the left side.        Femoral pulses are 2+ on the right side, and 2+ on the left side.       Dorsalis pedis pulses are 2+ on the right side, and 2+ on the left side.        Posterior tibial pulses are 2+ on the right side, and 2+ on the left side.   Pulmonary/Chest: Effort normal and breath sounds normal.   Abdominal: Soft. Normal appearance. There is no hepatosplenomegaly. There is no tenderness.   Musculoskeletal:        Right ankle: She exhibits no swelling, no ecchymosis and no deformity.        Left ankle: She exhibits no swelling, no ecchymosis and no deformity.   Lymphadenopathy:        Head (right side): No submandibular adenopathy present.        Head (left side): No submandibular adenopathy present.     She has no cervical adenopathy.   Neurological: She is alert and oriented to person, place, and time. She is not  disoriented. No cranial nerve deficit.   Skin: Skin is warm, dry and intact. No rash noted. She is not diaphoretic. No cyanosis. Nails show no clubbing.   Psychiatric: She has a normal mood and affect. Her speech is normal and behavior is normal. Judgment and thought content normal. Cognition and memory are normal.       Current Medications:     [START ON 3/20/2020] aspirin  81 mg Oral Daily    atorvastatin  20 mg Oral QHS    heparin (porcine)  5,000 Units Subcutaneous Q8H    insulin detemir U-100  5 Units Subcutaneous Daily     Current Laboratory Results:    Recent Results (from the past 24 hour(s))   POCT glucose    Collection Time: 03/19/20 12:00 AM   Result Value Ref Range    POCT Glucose 486 (HH) 70 - 110 mg/dL   Troponin I    Collection Time: 03/19/20 12:41 AM   Result Value Ref Range    Troponin I 0.684 (H) 0.000 - 0.026 ng/mL   Basic metabolic panel    Collection Time: 03/19/20 12:41 AM   Result Value Ref Range    Sodium 134 (L) 136 - 145 mmol/L    Potassium 4.3 3.5 - 5.1 mmol/L    Chloride 98 95 - 110 mmol/L    CO2 26 23 - 29 mmol/L    Glucose 515 (HH) 70 - 110 mg/dL    BUN, Bld 16 8 - 23 mg/dL    Creatinine 1.0 0.5 - 1.4 mg/dL    Calcium 10.5 8.7 - 10.5 mg/dL    Anion Gap 10 8 - 16 mmol/L    eGFR if African American >60 >60 mL/min/1.73 m^2    eGFR if non African American >60 >60 mL/min/1.73 m^2   CBC auto differential    Collection Time: 03/19/20  1:39 AM   Result Value Ref Range    WBC 6.75 3.90 - 12.70 K/uL    RBC 5.42 (H) 4.00 - 5.40 M/uL    Hemoglobin 14.4 12.0 - 16.0 g/dL    Hematocrit 44.0 37.0 - 48.5 %    Mean Corpuscular Volume 81 (L) 82 - 98 fL    Mean Corpuscular Hemoglobin 26.6 (L) 27.0 - 31.0 pg    Mean Corpuscular Hemoglobin Conc 32.7 32.0 - 36.0 g/dL    RDW 13.0 11.5 - 14.5 %    Platelets 160 150 - 350 K/uL    MPV 12.1 9.2 - 12.9 fL    Immature Granulocytes 0.1 0.0 - 0.5 %    Gran # (ANC) 3.4 1.8 - 7.7 K/uL    Immature Grans (Abs) 0.01 0.00 - 0.04 K/uL    Lymph # 2.6 1.0 - 4.8 K/uL     Mono # 0.6 0.3 - 1.0 K/uL    Eos # 0.2 0.0 - 0.5 K/uL    Baso # 0.04 0.00 - 0.20 K/uL    nRBC 0 0 /100 WBC    Gran% 49.8 38.0 - 73.0 %    Lymph% 38.7 18.0 - 48.0 %    Mono% 8.3 4.0 - 15.0 %    Eosinophil% 2.5 0.0 - 8.0 %    Basophil% 0.6 0.0 - 1.9 %    Differential Method Automated    POCT glucose    Collection Time: 03/19/20  1:39 AM   Result Value Ref Range    POCT Glucose 377 (H) 70 - 110 mg/dL   POCT glucose    Collection Time: 03/19/20  3:19 AM   Result Value Ref Range    POCT Glucose 330 (H) 70 - 110 mg/dL   Basic Metabolic Panel (BMP)    Collection Time: 03/19/20  6:17 AM   Result Value Ref Range    Sodium 136 136 - 145 mmol/L    Potassium 3.8 3.5 - 5.1 mmol/L    Chloride 102 95 - 110 mmol/L    CO2 26 23 - 29 mmol/L    Glucose 255 (H) 70 - 110 mg/dL    BUN, Bld 11 8 - 23 mg/dL    Creatinine 0.8 0.5 - 1.4 mg/dL    Calcium 10.0 8.7 - 10.5 mg/dL    Anion Gap 8 8 - 16 mmol/L    eGFR if African American >60 >60 mL/min/1.73 m^2    eGFR if non African American >60 >60 mL/min/1.73 m^2   CBC with Automated Differential    Collection Time: 03/19/20  6:17 AM   Result Value Ref Range    WBC 8.15 3.90 - 12.70 K/uL    RBC 5.31 4.00 - 5.40 M/uL    Hemoglobin 13.9 12.0 - 16.0 g/dL    Hematocrit 43.1 37.0 - 48.5 %    Mean Corpuscular Volume 81 (L) 82 - 98 fL    Mean Corpuscular Hemoglobin 26.2 (L) 27.0 - 31.0 pg    Mean Corpuscular Hemoglobin Conc 32.3 32.0 - 36.0 g/dL    RDW 13.0 11.5 - 14.5 %    Platelets 159 150 - 350 K/uL    MPV 11.7 9.2 - 12.9 fL    Immature Granulocytes 0.2 0.0 - 0.5 %    Gran # (ANC) 3.8 1.8 - 7.7 K/uL    Immature Grans (Abs) 0.02 0.00 - 0.04 K/uL    Lymph # 3.4 1.0 - 4.8 K/uL    Mono # 0.7 0.3 - 1.0 K/uL    Eos # 0.3 0.0 - 0.5 K/uL    Baso # 0.04 0.00 - 0.20 K/uL    nRBC 0 0 /100 WBC    Gran% 46.6 38.0 - 73.0 %    Lymph% 41.6 18.0 - 48.0 %    Mono% 8.0 4.0 - 15.0 %    Eosinophil% 3.1 0.0 - 8.0 %    Basophil% 0.5 0.0 - 1.9 %    Differential Method Automated    Hemoglobin A1c    Collection Time:  03/19/20  6:17 AM   Result Value Ref Range    Hemoglobin A1C 13.2 (H) 4.0 - 5.6 %    Estimated Avg Glucose 332 (H) 68 - 131 mg/dL   Troponin I    Collection Time: 03/19/20  6:17 AM   Result Value Ref Range    Troponin I 1.555 (H) 0.000 - 0.026 ng/mL   Lipid panel    Collection Time: 03/19/20  6:17 AM   Result Value Ref Range    Cholesterol 171 120 - 199 mg/dL    Triglycerides 59 30 - 150 mg/dL    HDL 58 40 - 75 mg/dL    LDL Cholesterol 101.2 63.0 - 159.0 mg/dL    Hdl/Cholesterol Ratio 33.9 20.0 - 50.0 %    Total Cholesterol/HDL Ratio 2.9 2.0 - 5.0    Non-HDL Cholesterol 113 mg/dL   POCT glucose    Collection Time: 03/19/20  9:14 AM   Result Value Ref Range    POCT Glucose 266 (H) 70 - 110 mg/dL   Troponin I    Collection Time: 03/19/20 12:19 PM   Result Value Ref Range    Troponin I 2.160 (H) 0.000 - 0.026 ng/mL   POCT glucose    Collection Time: 03/19/20 12:46 PM   Result Value Ref Range    POCT Glucose 277 (H) 70 - 110 mg/dL   Echo Color Flow Doppler? Yes    Collection Time: 03/19/20  1:18 PM   Result Value Ref Range    BSA 1.78 m2    TDI SEPTAL 0.05 m/s    LV LATERAL E/E' RATIO 11.63 m/s    LV SEPTAL E/E' RATIO 18.60 m/s    LA WIDTH 4.26 cm    TDI LATERAL 0.08 m/s    PV PEAK VELOCITY 1.11 cm/s    LVIDD 4.65 3.5 - 6.0 cm    IVS 0.79 0.6 - 1.1 cm    PW 0.75 0.6 - 1.1 cm    LVIDS 3.14 2.1 - 4.0 cm    FS 32 28 - 44 %    LA volume 67.00 cm3    Sinus 2.15 cm    STJ 1.83 cm    LV mass 114.28 g    LA size 3.71 cm    TAPSE 1.69 cm    Left Ventricle Relative Wall Thickness 0.32 cm    AV mean gradient 4 mmHg    AV valve area 2.08 cm2    AV Velocity Ratio 0.85     AV index (prosthetic) 0.79     MV valve area p 1/2 method 3.93 cm2    E/A ratio 1.45     Mean e' 0.07 m/s    E wave decelartion time 192.80 msec    Pulm vein S/D ratio 1.21     LVOT diameter 1.83 cm    LVOT area 2.6 cm2    LVOT peak micheal 1.00 m/s    LVOT peak VTI 21.02 cm    Ao peak micheal 1.17 m/s    Ao VTI 26.63 cm    LVOT stroke volume 55.26 cm3    AV peak gradient  5 mmHg    E/E' ratio 14.31 m/s    MV Peak E Brendan 0.93 m/s    TR Max Brendan 2.56 m/s    MV stenosis pressure 1/2 time 56 ms    MV Peak A Brendan 0.64 m/s    PV Peak S Brendan 0.58 m/s    PV Peak D Brendan 0.48 m/s    LV Systolic Volume 39.06 mL    LV Systolic Volume Index 22.3 mL/m2    LV Diastolic Volume 99.86 mL    LV Diastolic Volume Index 56.92 mL/m2    LA Volume Index 38.2 mL/m2    LV Mass Index 65 g/m2    RA Major Axis 3.66 cm    Left Atrium Minor Axis 5.10 cm    Left Atrium Major Axis 4.88 cm    Triscuspid Valve Regurgitation Peak Gradient 26 mmHg    LA Volume Index (Mod) 42.19 mL/m2    RA Width 3.01 cm    Right Atrial Pressure (from IVC) 3 mmHg    TV rest pulmonary artery pressure 29 mmHg     Current Imaging Results:    X-Ray Chest PA And Lateral   Final Result      No acute intrathoracic abnormality.         Electronically signed by: Mili Davis   Date:    03/19/2020   Time:    00:27        ECG: NSR. Minor nonspecific ST T wave changes.      3/19/2020: Echo:    Normal left ventricular systolic function. The estimated ejection fraction is 60%.  Basal inferior hypokinesia.  Grade II (moderate) left ventricular diastolic dysfunction consistent with pseudonormalization.  Mild left atrial enlargement.  Normal right ventricular systolic function.  Mild mitral regurgitation.  Mild tricuspid regurgitation.  The estimated PA systolic pressure is 29 mmHg.  Normal central venous pressure (3 mmHg).      Assessment and Plan:     Active Diagnoses:    Diagnosis Date Noted POA    PRINCIPAL PROBLEM:  NSTEMI (non-ST elevated myocardial infarction) [I21.4] 03/19/2020 Yes    Type 2 diabetes mellitus with hyperglycemia, without long-term current use of insulin [E11.65] 03/19/2020 Yes      Problems Resolved During this Admission:     Assessment and Plan:    1. Coronary Artery Disease   3/11/2020: Began experience occasional pain in jaw.   3/18/2020: Jaw and chest pain for 60 minutes. NSTEMI. Troponin 2.2.   3/19/2020: Echo: Normal left  ventricular size and overall systolic function. Basal inferior hypokinesia. EF 60%. Moderate diastolic dysfunction. Mildly dilated LA.   Aspirin, clopidogrel and heparin sc.   NTG patch.   3/20/2020: Plan cath.    2. Diabetes Mellitus, Type 2   2006: Diagnosed. Complications: CAD. Medications: Oral agent.    3. Overweight   3/19/2020: Weight 70 kg. BMI 27.    4. Primary Care   In transition.    The planned procedure was discussed in detail with the patient and the family members present. Risk, benefit and alternatives were reviewed. All questions answered. Consent was then signed.    If further questions or concerns arise the patient was encouraged to contact me prior to the planned procedure.          VTE Risk Mitigation (From admission, onward)         Ordered     heparin (porcine) injection 5,000 Units  Every 8 hours      03/19/20 0255     IP VTE LOW RISK PATIENT  Once      03/19/20 0255     Place sequential compression device  Until discontinued      03/19/20 0255                Thank you for your consult.    I will follow-up with patient. Please contact us if you have any additional questions.    Robyn Little MD  Cardiology   [facility]

## 2020-03-19 NOTE — ED TRIAGE NOTES
"Pt presents to ER via personal transportation with c/o intermittent midsternal chest "tightness" x1 week that worsened tonight. States pain starts in lower jaw then causes chest pain. Reports accompanied by intermittent SOB with dry cough. Denies N/V, abd pain, diaphoresis.Was seen recently and dx with sinus infection has been taking amoxicillin, zyrtec, and flonase. Also reports bilateral ear pain.   "

## 2020-03-19 NOTE — PLAN OF CARE
No significant events overnight. Remains free from fall, injury, and skin breakdown. VSS on RA. Afebrile Positions self independently. Denies pain and SOB. Tele maintained, NSR; all alarms active and audible. Peripheral IV/dressing CDI. SCDs in place. Plan of care reviewed with patient and all questions answered. Bed low, locked. Call light within reach. Purposeful rounding performed. Resting comfortably in bed, no other complaints at this time.

## 2020-03-19 NOTE — PLAN OF CARE
Discharge Planning:  Patient admitted on 3-18-20  LOS- day 1  Chart reviewed, Care plan discussed (with Ms Purvis via telephone)  Discussed care plan with treatment team,  attending Dr Montejo  Consults following are: case mgt., cardiology  PCP updated in [facility]: yes  Pharmacy, updated in [facility]: [facility] [address]  Insurance: medicaid pending  DME at home: o-2, r/w, cane  Current dispo:  pending- cath procedure tomorrow (Dr Little)  Transportation: has reliable  Case management to follow       03/19/20 1646   Discharge Assessment   Assessment Type Discharge Planning Assessment   Confirmed/corrected address and phone number on facesheet? Yes   Assessment information obtained from? Patient;Caregiver;Medical Record   Communicated expected length of stay with patient/caregiver yes   Prior to hospitilization cognitive status: Alert/Oriented   Prior to hospitalization functional status: Independent   Current cognitive status: Alert/Oriented   Current Functional Status: Independent   Lives With sibling(s)   Able to Return to Prior Arrangements yes   Is patient able to care for self after discharge? Yes   Patient currently receives any other outside agency services? No   Equipment Currently Used at Home none   Do you have any problems affording any of your prescribed medications? Yes   Is the patient taking medications as prescribed? yes   Does the patient have transportation home? Yes   Transportation Anticipated family or friend will provide   Discharge Plan A Home   DME Needed Upon Discharge  none   Patient/Family in Agreement with Plan yes

## 2020-03-19 NOTE — SUBJECTIVE & OBJECTIVE
Past Medical History:   Diagnosis Date    Diabetes mellitus        Past Surgical History:   Procedure Laterality Date    HYSTERECTOMY         Review of patient's allergies indicates:  No Known Allergies    No current facility-administered medications on file prior to encounter.      Current Outpatient Medications on File Prior to Encounter   Medication Sig    cetirizine (ZYRTEC) 10 MG tablet Take 1 tablet (10 mg total) by mouth once daily.    fluticasone propionate (FLONASE) 50 mcg/actuation nasal spray 1 spray (50 mcg total) by Each Nostril route 2 (two) times daily as needed for Rhinitis.    metFORMIN (GLUCOPHAGE) 500 MG tablet Take 1 tablet (500 mg total) by mouth 2 (two) times daily with meals.     Family History     None        Tobacco Use    Smoking status: Never Smoker    Smokeless tobacco: Never Used   Substance and Sexual Activity    Alcohol use: No    Drug use: No    Sexual activity: Not on file     Review of Systems   Constitutional: Negative for chills, diaphoresis and fever.   HENT: Positive for sinus pressure and sinus pain. Negative for congestion, ear pain, postnasal drip, rhinorrhea, sore throat and trouble swallowing.    Respiratory: Positive for shortness of breath. Negative for cough and wheezing.    Cardiovascular: Positive for chest pain. Negative for palpitations.   Gastrointestinal: Negative for abdominal pain, diarrhea, nausea and vomiting.   Genitourinary: Negative for dysuria, flank pain, frequency, hematuria and urgency.   Musculoskeletal: Negative for arthralgias, back pain, neck pain and neck stiffness.   Skin: Negative for color change, pallor, rash and wound.   Neurological: Negative for dizziness, syncope, light-headedness and headaches.   Psychiatric/Behavioral: Negative for confusion and decreased concentration.     Objective:     Vital Signs (Most Recent):  Temp: 98.7 °F (37.1 °C) (03/19/20 0247)  Pulse: 61 (03/19/20 0247)  Resp: 17 (03/19/20 0247)  BP: (!) 147/76  (03/19/20 0247)  SpO2: 100 % (03/19/20 0247) Vital Signs (24h Range):  Temp:  [97.8 °F (36.6 °C)-98.7 °F (37.1 °C)] 98.7 °F (37.1 °C)  Pulse:  [58-85] 61  Resp:  [17-19] 17  SpO2:  [97 %-100 %] 100 %  BP: (147-196)/(76-89) 147/76     Weight: 76.2 kg (168 lb)  Body mass index is 28.84 kg/m².    Physical Exam   Constitutional: She is oriented to person, place, and time. She appears well-developed and well-nourished. No distress.   HENT:   Head: Normocephalic and atraumatic.   Eyes: Pupils are equal, round, and reactive to light. Conjunctivae and EOM are normal. No scleral icterus.   Neck: Normal range of motion. Neck supple. No tracheal deviation present.   Cardiovascular: Normal rate, regular rhythm, normal heart sounds and intact distal pulses. Exam reveals no gallop and no friction rub.   No murmur heard.  Pulmonary/Chest: Effort normal and breath sounds normal. No stridor. No respiratory distress. She has no wheezes. She has no rales.   Abdominal: Soft. Bowel sounds are normal. She exhibits no distension. There is no tenderness. There is no guarding.   Musculoskeletal: Normal range of motion. She exhibits no deformity.   Neurological: She is alert and oriented to person, place, and time. No cranial nerve deficit. She exhibits normal muscle tone.   Skin: Skin is warm and dry. She is not diaphoretic.   Psychiatric: She has a normal mood and affect. Her behavior is normal. Judgment and thought content normal.   Nursing note and vitals reviewed.        CRANIAL NERVES     CN III, IV, VI   Pupils are equal, round, and reactive to light.  Extraocular motions are normal.        Significant Labs:   BMP:   Recent Labs   Lab 03/19/20  0041   *   *   K 4.3   CL 98   CO2 26   BUN 16   CREATININE 1.0   CALCIUM 10.5     CBC:   Recent Labs   Lab 03/19/20  0139   WBC 6.75   HGB 14.4   HCT 44.0        CMP:   Recent Labs   Lab 03/19/20  0041   *   K 4.3   CL 98   CO2 26   *   BUN 16   CREATININE 1.0    CALCIUM 10.5   ANIONGAP 10   EGFRNONAA >60     Urine Culture: No results for input(s): LABURIN in the last 48 hours.  Urine Studies: No results for input(s): COLORU, APPEARANCEUA, PHUR, SPECGRAV, PROTEINUA, GLUCUA, KETONESU, BILIRUBINUA, OCCULTUA, NITRITE, UROBILINOGEN, LEUKOCYTESUR, RBCUA, WBCUA, BACTERIA, SQUAMEPITHEL, HYALINECASTS in the last 48 hours.    Invalid input(s): WRIGHTSUR  All pertinent labs within the past 24 hours have been reviewed.    Significant Imaging: I have reviewed all pertinent imaging results/findings within the past 24 hours.   Imaging Results          X-Ray Chest PA And Lateral (Final result)  Result time 03/19/20 00:27:49    Final result by Mili Davis MD (03/19/20 00:27:49)                 Impression:      No acute intrathoracic abnormality.      Electronically signed by: Mili Davis  Date:    03/19/2020  Time:    00:27             Narrative:    EXAMINATION:  CHEST PA AND LATERAL    CLINICAL HISTORY:  Chest pain, unspecified    TECHNIQUE:  PA and lateral chest radiograph    COMPARISON:  03/23/2007    FINDINGS:  The cardiac silhouette is within normal limits.   There is no focal consolidation, pneumothorax, or pleural effusion.

## 2020-03-19 NOTE — H&P
"[facility] Hospital Medicine  History & Physical    Patient Name: Jamee Purvis  MRN: [id number]  Admission Date: 3/18/2020  Attending Physician: Mio Montejo MD   Primary Care Provider: Primary Doctor No         Patient information was obtained from patient, past medical records and ER records.     Subjective:     Principal Problem:NSTEMI (non-ST elevated myocardial infarction)    Chief Complaint:   Chief Complaint   Patient presents with    Chest Pain     pt has c/o shortness of breath, mid sternal chest pain and sinus congestion         HPI: Ms. Jamee Purvis is a 64 y.o. female, with PMH of NIDDM-2, who [facility]18/20 with intermittent chest pain x 1 week. She states the chest pain begins in the b/l sides of her jaw, and travels downward as a "tightness" to the mid chest. She states the pain usually lasts about 1/2 an hour. Tonight the pain began 2.5 hours PTA. She states the pain occurs both with exertion and during rest, resoving on it's own. She denies diaphoresis, SOB, N/V. She was evaluated in the ED, and EKG showed St changes in inferior leads. And elevated troponin of 0.684. She was admitted to inpatient status.     Past Medical History:   Diagnosis Date    Diabetes mellitus        Past Surgical History:   Procedure Laterality Date    HYSTERECTOMY         Review of patient's allergies indicates:  No Known Allergies    No current facility-administered medications on file prior to encounter.      Current Outpatient Medications on File Prior to Encounter   Medication Sig    cetirizine (ZYRTEC) 10 MG tablet Take 1 tablet (10 mg total) by mouth once daily.    fluticasone propionate (FLONASE) 50 mcg/actuation nasal spray 1 spray (50 mcg total) by Each Nostril route 2 (two) times daily as needed for Rhinitis.    metFORMIN (GLUCOPHAGE) 500 MG tablet Take 1 tablet (500 mg total) by mouth 2 (two) times daily with meals.     Family History     None        Tobacco Use "    Smoking status: Never Smoker    Smokeless tobacco: Never Used   Substance and Sexual Activity    Alcohol use: No    Drug use: No    Sexual activity: Not on file     Review of Systems   Constitutional: Negative for chills, diaphoresis and fever.   HENT: Positive for sinus pressure and sinus pain. Negative for congestion, ear pain, postnasal drip, rhinorrhea, sore throat and trouble swallowing.    Respiratory: Positive for shortness of breath. Negative for cough and wheezing.    Cardiovascular: Positive for chest pain. Negative for palpitations.   Gastrointestinal: Negative for abdominal pain, diarrhea, nausea and vomiting.   Genitourinary: Negative for dysuria, flank pain, frequency, hematuria and urgency.   Musculoskeletal: Negative for arthralgias, back pain, neck pain and neck stiffness.   Skin: Negative for color change, pallor, rash and wound.   Neurological: Negative for dizziness, syncope, light-headedness and headaches.   Psychiatric/Behavioral: Negative for confusion and decreased concentration.     Objective:     Vital Signs (Most Recent):  Temp: 98.7 °F (37.1 °C) (03/19/20 0247)  Pulse: 61 (03/19/20 0247)  Resp: 17 (03/19/20 0247)  BP: (!) 147/76 (03/19/20 0247)  SpO2: 100 % (03/19/20 0247) Vital Signs (24h Range):  Temp:  [97.8 °F (36.6 °C)-98.7 °F (37.1 °C)] 98.7 °F (37.1 °C)  Pulse:  [58-85] 61  Resp:  [17-19] 17  SpO2:  [97 %-100 %] 100 %  BP: (147-196)/(76-89) 147/76     Weight: 76.2 kg (168 lb)  Body mass index is 28.84 kg/m².    Physical Exam   Constitutional: She is oriented to person, place, and time. She appears well-developed and well-nourished. No distress.   HENT:   Head: Normocephalic and atraumatic.   Eyes: Pupils are equal, round, and reactive to light. Conjunctivae and EOM are normal. No scleral icterus.   Neck: Normal range of motion. Neck supple. No tracheal deviation present.   Cardiovascular: Normal rate, regular rhythm, normal heart sounds and intact distal pulses. Exam  reveals no gallop and no friction rub.   No murmur heard.  Pulmonary/Chest: Effort normal and breath sounds normal. No stridor. No respiratory distress. She has no wheezes. She has no rales.   Abdominal: Soft. Bowel sounds are normal. She exhibits no distension. There is no tenderness. There is no guarding.   Musculoskeletal: Normal range of motion. She exhibits no deformity.   Neurological: She is alert and oriented to person, place, and time. No cranial nerve deficit. She exhibits normal muscle tone.   Skin: Skin is warm and dry. She is not diaphoretic.   Psychiatric: She has a normal mood and affect. Her behavior is normal. Judgment and thought content normal.   Nursing note and vitals reviewed.        CRANIAL NERVES     CN III, IV, VI   Pupils are equal, round, and reactive to light.  Extraocular motions are normal.        Significant Labs:   BMP:   Recent Labs   Lab 03/19/20  0041   *   *   K 4.3   CL 98   CO2 26   BUN 16   CREATININE 1.0   CALCIUM 10.5     CBC:   Recent Labs   Lab 03/19/20  0139   WBC 6.75   HGB 14.4   HCT 44.0        CMP:   Recent Labs   Lab 03/19/20  0041   *   K 4.3   CL 98   CO2 26   *   BUN 16   CREATININE 1.0   CALCIUM 10.5   ANIONGAP 10   EGFRNONAA >60     Urine Culture: No results for input(s): LABURIN in the last 48 hours.  Urine Studies: No results for input(s): COLORU, APPEARANCEUA, PHUR, SPECGRAV, PROTEINUA, GLUCUA, KETONESU, BILIRUBINUA, OCCULTUA, NITRITE, UROBILINOGEN, LEUKOCYTESUR, RBCUA, WBCUA, BACTERIA, SQUAMEPITHEL, HYALINECASTS in the last 48 hours.    Invalid input(s): WRIGHTSUR  All pertinent labs within the past 24 hours have been reviewed.    Significant Imaging: I have reviewed all pertinent imaging results/findings within the past 24 hours.   Imaging Results          X-Ray Chest PA And Lateral (Final result)  Result time 03/19/20 00:27:49    Final result by Mili Davis MD (03/19/20 00:27:49)                 Impression:      No  acute intrathoracic abnormality.      Electronically signed by: Mili Davis  Date:    03/19/2020  Time:    00:27             Narrative:    EXAMINATION:  CHEST PA AND LATERAL    CLINICAL HISTORY:  Chest pain, unspecified    TECHNIQUE:  PA and lateral chest radiograph    COMPARISON:  03/23/2007    FINDINGS:  The cardiac silhouette is within normal limits.   There is no focal consolidation, pneumothorax, or pleural effusion.                                 Assessment/Plan:     * NSTEMI (non-ST elevated myocardial infarction)  - Ms. Jamee Purvis is admitted to inpatient status  - she has unstable angina w/ elevated troponin, and inferior EKG changes   - monitor troponin  - EKGs w/ troponin draws  - Cardiology consulted   - HEART score: 5      Type 2 diabetes mellitus with hyperglycemia, without long-term current use of insulin  - hyperglycemic in ED   - administered IV fluids    - insulin ordered, not administered    - CBG after fluids 377   - A1C pending for AM   - hold oral antidiabetic meds   - Diabetic diet   - SSI with accuchekcs AC/HS        VTE Risk Mitigation (From admission, onward)         Ordered     heparin (porcine) injection 5,000 Units  Every 8 hours      03/19/20 0255     IP VTE LOW RISK PATIENT  Once      03/19/20 0255     Place sequential compression device  Until discontinued      03/19/20 0255                   BRITTANY MartinezC  Department of Hospital Medicine   [facility]

## 2020-03-19 NOTE — NURSING
Pt arrived to unit. Oriented to room. VSS. Afebrile. SCDs placed on. NS bolus from ED still running. Peripheral IV CDI. B, reported to KARLA Hill; instructed to admin bedtime dose of PRN insulin aspart according to sliding scale. Denies chest/jaw pain and SOB at this time. Call light within reach. Bed low, locked. Side rails up x2. Will continue to monitor.

## 2020-03-20 PROBLEM — I25.10 CORONARY ARTERY DISEASE: Status: ACTIVE | Noted: 2020-03-20

## 2020-03-20 PROBLEM — R07.9 CHEST PAIN: Status: RESOLVED | Noted: 2020-03-20 | Resolved: 2020-03-20

## 2020-03-20 PROBLEM — I25.10 CORONARY ARTERY DISEASE: Status: RESOLVED | Noted: 2020-03-20 | Resolved: 2020-03-20

## 2020-03-20 PROBLEM — E66.3 OVERWEIGHT: Status: ACTIVE | Noted: 2020-03-20

## 2020-03-20 PROBLEM — E11.9 DIABETES MELLITUS, TYPE 2: Status: ACTIVE | Noted: 2020-03-19

## 2020-03-20 PROBLEM — R07.9 CHEST PAIN: Status: ACTIVE | Noted: 2020-03-20

## 2020-03-20 PROBLEM — Z95.5 HISTORY OF CORONARY ARTERY STENT PLACEMENT: Status: ACTIVE | Noted: 2020-03-20

## 2020-03-20 LAB
ANION GAP SERPL CALC-SCNC: 9 MMOL/L (ref 8–16)
BASOPHILS # BLD AUTO: 0.02 K/UL (ref 0–0.2)
BASOPHILS NFR BLD: 0.3 % (ref 0–1.9)
BUN SERPL-MCNC: 13 MG/DL (ref 8–23)
CALCIUM SERPL-MCNC: 9.4 MG/DL (ref 8.7–10.5)
CHLORIDE SERPL-SCNC: 101 MMOL/L (ref 95–110)
CO2 SERPL-SCNC: 25 MMOL/L (ref 23–29)
CREAT SERPL-MCNC: 0.7 MG/DL (ref 0.5–1.4)
DIFFERENTIAL METHOD: ABNORMAL
EOSINOPHIL # BLD AUTO: 0.2 K/UL (ref 0–0.5)
EOSINOPHIL NFR BLD: 3 % (ref 0–8)
ERYTHROCYTE [DISTWIDTH] IN BLOOD BY AUTOMATED COUNT: 13.1 % (ref 11.5–14.5)
EST. GFR  (AFRICAN AMERICAN): >60 ML/MIN/1.73 M^2
EST. GFR  (NON AFRICAN AMERICAN): >60 ML/MIN/1.73 M^2
GLUCOSE SERPL-MCNC: 242 MG/DL (ref 70–110)
HCT VFR BLD AUTO: 41 % (ref 37–48.5)
HGB BLD-MCNC: 13.2 G/DL (ref 12–16)
IMM GRANULOCYTES # BLD AUTO: 0.01 K/UL (ref 0–0.04)
IMM GRANULOCYTES NFR BLD AUTO: 0.2 % (ref 0–0.5)
LYMPHOCYTES # BLD AUTO: 2.6 K/UL (ref 1–4.8)
LYMPHOCYTES NFR BLD: 41.7 % (ref 18–48)
MCH RBC QN AUTO: 26 PG (ref 27–31)
MCHC RBC AUTO-ENTMCNC: 32.2 G/DL (ref 32–36)
MCV RBC AUTO: 81 FL (ref 82–98)
MONOCYTES # BLD AUTO: 0.6 K/UL (ref 0.3–1)
MONOCYTES NFR BLD: 8.7 % (ref 4–15)
NEUTROPHILS # BLD AUTO: 2.9 K/UL (ref 1.8–7.7)
NEUTROPHILS NFR BLD: 46.1 % (ref 38–73)
NRBC BLD-RTO: 0 /100 WBC
PLATELET # BLD AUTO: 153 K/UL (ref 150–350)
PMV BLD AUTO: 12.1 FL (ref 9.2–12.9)
POCT GLUCOSE: 208 MG/DL (ref 70–110)
POCT GLUCOSE: 213 MG/DL (ref 70–110)
POCT GLUCOSE: 232 MG/DL (ref 70–110)
POTASSIUM SERPL-SCNC: 3.6 MMOL/L (ref 3.5–5.1)
RBC # BLD AUTO: 5.07 M/UL (ref 4–5.4)
SODIUM SERPL-SCNC: 135 MMOL/L (ref 136–145)
WBC # BLD AUTO: 6.33 K/UL (ref 3.9–12.7)

## 2020-03-20 PROCEDURE — 25500020 PHARM REV CODE 255: Performed by: INTERNAL MEDICINE

## 2020-03-20 PROCEDURE — 25000003 PHARM REV CODE 250: Performed by: INTERNAL MEDICINE

## 2020-03-20 PROCEDURE — 63600175 PHARM REV CODE 636 W HCPCS: Performed by: PHYSICIAN ASSISTANT

## 2020-03-20 PROCEDURE — 25000003 PHARM REV CODE 250: Performed by: PHYSICIAN ASSISTANT

## 2020-03-20 PROCEDURE — 93005 ELECTROCARDIOGRAM TRACING: CPT

## 2020-03-20 PROCEDURE — C1769 GUIDE WIRE: HCPCS | Performed by: INTERNAL MEDICINE

## 2020-03-20 PROCEDURE — C1887 CATHETER, GUIDING: HCPCS | Performed by: INTERNAL MEDICINE

## 2020-03-20 PROCEDURE — 99233 SBSQ HOSP IP/OBS HIGH 50: CPT | Mod: ,,, | Performed by: HOSPITALIST

## 2020-03-20 PROCEDURE — 93010 ELECTROCARDIOGRAM REPORT: CPT | Mod: ,,, | Performed by: INTERNAL MEDICINE

## 2020-03-20 PROCEDURE — 20000000 HC ICU ROOM

## 2020-03-20 PROCEDURE — C9600 PERC DRUG-EL COR STENT SING: HCPCS | Mod: RC | Performed by: INTERNAL MEDICINE

## 2020-03-20 PROCEDURE — 63600175 PHARM REV CODE 636 W HCPCS: Performed by: INTERNAL MEDICINE

## 2020-03-20 PROCEDURE — 93454 CORONARY ARTERY ANGIO S&I: CPT | Mod: 59 | Performed by: INTERNAL MEDICINE

## 2020-03-20 PROCEDURE — 80048 BASIC METABOLIC PNL TOTAL CA: CPT

## 2020-03-20 PROCEDURE — C1874 STENT, COATED/COV W/DEL SYS: HCPCS | Performed by: INTERNAL MEDICINE

## 2020-03-20 PROCEDURE — C1894 INTRO/SHEATH, NON-LASER: HCPCS | Performed by: INTERNAL MEDICINE

## 2020-03-20 PROCEDURE — 36415 COLL VENOUS BLD VENIPUNCTURE: CPT

## 2020-03-20 PROCEDURE — 93010 EKG 12-LEAD: ICD-10-PCS | Mod: ,,, | Performed by: INTERNAL MEDICINE

## 2020-03-20 PROCEDURE — 99233 PR SUBSEQUENT HOSPITAL CARE,LEVL III: ICD-10-PCS | Mod: ,,, | Performed by: HOSPITALIST

## 2020-03-20 PROCEDURE — C1725 CATH, TRANSLUMIN NON-LASER: HCPCS | Performed by: INTERNAL MEDICINE

## 2020-03-20 PROCEDURE — 11000001 HC ACUTE MED/SURG PRIVATE ROOM

## 2020-03-20 PROCEDURE — 85025 COMPLETE CBC W/AUTO DIFF WBC: CPT

## 2020-03-20 DEVICE — STENT RONYX25015UX RESOLUTE ONYX 2.50X15
Type: IMPLANTABLE DEVICE | Site: HEART | Status: FUNCTIONAL
Brand: RESOLUTE ONYX™

## 2020-03-20 DEVICE — STENT RONYX22515UX RESOLUTE ONYX 2.25X15
Type: IMPLANTABLE DEVICE | Site: HEART | Status: FUNCTIONAL
Brand: RESOLUTE ONYX™

## 2020-03-20 DEVICE — STENT RONYX25012UX RESOLUTE ONYX 2.50X12
Type: IMPLANTABLE DEVICE | Site: HEART | Status: FUNCTIONAL
Brand: RESOLUTE ONYX™

## 2020-03-20 DEVICE — STENT RONYX22518UX RESOLUTE ONYX 2.25X18
Type: IMPLANTABLE DEVICE | Site: HEART | Status: FUNCTIONAL
Brand: RESOLUTE ONYX™

## 2020-03-20 RX ORDER — HYDROCODONE BITARTRATE AND ACETAMINOPHEN 5; 325 MG/1; MG/1
1 TABLET ORAL EVERY 4 HOURS PRN
Status: DISCONTINUED | OUTPATIENT
Start: 2020-03-20 | End: 2020-03-21

## 2020-03-20 RX ORDER — ATORVASTATIN CALCIUM 20 MG/1
80 TABLET, FILM COATED ORAL DAILY
Status: DISCONTINUED | OUTPATIENT
Start: 2020-03-21 | End: 2020-03-23 | Stop reason: HOSPADM

## 2020-03-20 RX ORDER — ATROPINE SULFATE 0.1 MG/ML
0.5 INJECTION INTRAVENOUS
Status: DISCONTINUED | OUTPATIENT
Start: 2020-03-20 | End: 2020-03-21

## 2020-03-20 RX ORDER — HEPARIN SOD,PORCINE/0.9 % NACL 1000/500ML
INTRAVENOUS SOLUTION INTRAVENOUS
Status: DISCONTINUED | OUTPATIENT
Start: 2020-03-20 | End: 2020-03-20 | Stop reason: HOSPADM

## 2020-03-20 RX ORDER — LIDOCAINE HYDROCHLORIDE 10 MG/ML
INJECTION INFILTRATION; PERINEURAL
Status: DISCONTINUED | OUTPATIENT
Start: 2020-03-20 | End: 2020-03-20 | Stop reason: HOSPADM

## 2020-03-20 RX ORDER — MIDAZOLAM HYDROCHLORIDE 1 MG/ML
INJECTION, SOLUTION INTRAMUSCULAR; INTRAVENOUS
Status: DISCONTINUED | OUTPATIENT
Start: 2020-03-20 | End: 2020-03-20 | Stop reason: HOSPADM

## 2020-03-20 RX ORDER — FENTANYL CITRATE 50 UG/ML
INJECTION, SOLUTION INTRAMUSCULAR; INTRAVENOUS
Status: DISCONTINUED | OUTPATIENT
Start: 2020-03-20 | End: 2020-03-20 | Stop reason: HOSPADM

## 2020-03-20 RX ORDER — NITROGLYCERIN 5 MG/ML
INJECTION, SOLUTION INTRAVENOUS
Status: DISCONTINUED | OUTPATIENT
Start: 2020-03-20 | End: 2020-03-20 | Stop reason: HOSPADM

## 2020-03-20 RX ORDER — DIPHENHYDRAMINE HCL 25 MG
25 CAPSULE ORAL
Status: DISCONTINUED | OUTPATIENT
Start: 2020-03-20 | End: 2020-03-20 | Stop reason: HOSPADM

## 2020-03-20 RX ORDER — SODIUM CHLORIDE 9 MG/ML
INJECTION, SOLUTION INTRAVENOUS CONTINUOUS
Status: DISCONTINUED | OUTPATIENT
Start: 2020-03-20 | End: 2020-03-20

## 2020-03-20 RX ORDER — ACETAMINOPHEN 325 MG/1
650 TABLET ORAL EVERY 4 HOURS PRN
Status: DISCONTINUED | OUTPATIENT
Start: 2020-03-20 | End: 2020-03-21

## 2020-03-20 RX ORDER — SODIUM CHLORIDE 9 MG/ML
INJECTION, SOLUTION INTRAVENOUS CONTINUOUS
Status: ACTIVE | OUTPATIENT
Start: 2020-03-20 | End: 2020-03-20

## 2020-03-20 RX ADMIN — INSULIN ASPART 2 UNITS: 100 INJECTION, SOLUTION INTRAVENOUS; SUBCUTANEOUS at 01:03

## 2020-03-20 RX ADMIN — DIPHENHYDRAMINE HYDROCHLORIDE 25 MG: 25 CAPSULE ORAL at 07:03

## 2020-03-20 RX ADMIN — INSULIN ASPART 2 UNITS: 100 INJECTION, SOLUTION INTRAVENOUS; SUBCUTANEOUS at 07:03

## 2020-03-20 RX ADMIN — INSULIN ASPART 1 UNITS: 100 INJECTION, SOLUTION INTRAVENOUS; SUBCUTANEOUS at 10:03

## 2020-03-20 RX ADMIN — SODIUM CHLORIDE: 0.9 INJECTION, SOLUTION INTRAVENOUS at 07:03

## 2020-03-20 RX ADMIN — ACETAMINOPHEN 650 MG: 325 TABLET ORAL at 04:03

## 2020-03-20 RX ADMIN — HYDROCODONE BITARTRATE AND ACETAMINOPHEN 1 TABLET: 5; 325 TABLET ORAL at 07:03

## 2020-03-20 RX ADMIN — HYDROCODONE BITARTRATE AND ACETAMINOPHEN 1 TABLET: 5; 325 TABLET ORAL at 10:03

## 2020-03-20 NOTE — PROGRESS NOTES
"[facility]  Hospital Medicine  Progress Note    Patient Name: Jamee Purvis  MRN: [id number]  Patient Class: IP- Inpatient   Admission Date: 3/18/2020  Length of Stay: 0 days  Attending Physician: Mio Montejo MD  Primary Care Provider: [facility]        Subjective:     Principal Problem:NSTEMI (non-ST elevated myocardial infarction)        HPI:  Ms. Jamee Purvis is a 64 y.o. female, with PMH of NIDDM-2, who presented to [facility] ED on 3/18/20 with intermittent chest pain x 1 week. She states the chest pain begins in the b/l sides of her jaw, and travels downward as a "tightness" to the mid chest. She states the pain usually lasts about 1/2 an hour. Tonight the pain began 2.5 hours PTA. She states the pain occurs both with exertion and during rest, resoving on it's own. She denies diaphoresis, SOB, N/V. She was evaluated in the ED, and EKG showed St changes in inferior leads. And elevated troponin of 0.684. She was admitted to inpatient status.     Overview/Hospital Course:  No notes on file    Interval History: No further pain this morning.  Denies shortness of breath.  Rather anxious.  All questions answered.    Review of Systems   Constitutional: Negative for activity change, chills, diaphoresis and fatigue.   HENT: Negative for congestion, drooling and hearing loss.    Eyes: Negative for discharge.   Respiratory: Positive for chest tightness and shortness of breath. Negative for apnea and wheezing.    Cardiovascular: Positive for chest pain. Negative for palpitations and leg swelling.   Gastrointestinal: Negative for abdominal distention, abdominal pain, constipation and diarrhea.   Musculoskeletal: Negative for arthralgias and gait problem.   Skin: Negative for rash.   Neurological: Negative for seizures, light-headedness and numbness.   Hematological: Negative for adenopathy.   Psychiatric/Behavioral: Negative for agitation and behavioral problems. "     Objective:     Vital Signs (Most Recent):  Temp: 97.6 °F (36.4 °C) (03/19/20 2017)  Pulse: 69 (03/19/20 2017)  Resp: 18 (03/19/20 2017)  BP: 104/64 (03/19/20 2017)  SpO2: 98 % (03/19/20 2017) Vital Signs (24h Range):  Temp:  [97.4 °F (36.3 °C)-98.7 °F (37.1 °C)] 97.6 °F (36.4 °C)  Pulse:  [54-88] 69  Resp:  [14-19] 18  SpO2:  [97 %-100 %] 98 %  BP: (104-196)/(64-89) 104/64     Weight: 70.2 kg (154 lb 13.2 oz)  Body mass index is 26.58 kg/m².    Intake/Output Summary (Last 24 hours) at 3/19/2020 2035  Last data filed at 3/19/2020 0600  Gross per 24 hour   Intake 1120 ml   Output --   Net 1120 ml      Physical Exam   Constitutional: She is oriented to person, place, and time. She appears well-developed and well-nourished. No distress.   HENT:   Head: Normocephalic and atraumatic.   Eyes: Pupils are equal, round, and reactive to light. Conjunctivae and EOM are normal. No scleral icterus.   Neck: Normal range of motion. Neck supple. No tracheal deviation present.   Cardiovascular: Normal rate, regular rhythm, normal heart sounds and intact distal pulses. Exam reveals no gallop and no friction rub.   No murmur heard.  Pulmonary/Chest: Effort normal and breath sounds normal. No stridor. No respiratory distress. She has no wheezes. She has no rales.   Abdominal: Soft. Bowel sounds are normal. She exhibits no distension. There is no tenderness. There is no guarding.   Musculoskeletal: Normal range of motion. She exhibits no deformity.   Neurological: She is alert and oriented to person, place, and time. No cranial nerve deficit. She exhibits normal muscle tone.   Skin: Skin is warm and dry. She is not diaphoretic.   Psychiatric: She has a normal mood and affect. Her behavior is normal. Judgment and thought content normal.   Nursing note and vitals reviewed.      Significant Labs: All pertinent labs within the past 24 hours have been reviewed.    Significant Imaging: I have reviewed and interpreted all pertinent imaging  results/findings within the past 24 hours.      Assessment/Plan:      * NSTEMI (non-ST elevated myocardial infarction)  -Ms. Jamee Purvis is admitted to inpatient status  -She is noted to have inferior st segment depression and positive troponins  -Await cardiology consultation  -Treat with aspirin and statin.  -Avoiding beta-blocker given possible inferior mi  -Likely to need angiogram.  -Order echo today.    Type 2 diabetes mellitus with hyperglycemia, without long-term current use of insulin  - -A1c 13.7  -Takes metformin at home  -Will certainly require insulin at discharge  -Consult diabetes educator  -Start detemir insulin and sliding scale insulin ac/hs.      VTE Risk Mitigation (From admission, onward)         Ordered     heparin (porcine) injection 5,000 Units  Every 8 hours      03/19/20 0255     IP VTE LOW RISK PATIENT  Once      03/19/20 0255     Place sequential compression device  Until discontinued      03/19/20 0255                      Mio Montejo MD  Department of Hospital Medicine   [facility]

## 2020-03-20 NOTE — ASSESSMENT & PLAN NOTE
-Ms. Jamee Purvis is admitted to inpatient status  -She is noted to have inferior st segment depression and positive troponins  -Await cardiology consultation  -Treat with aspirin and statin.  -Avoiding beta-blocker given possible inferior mi  -Likely to need angiogram.  -Order echo today.

## 2020-03-20 NOTE — BRIEF OP NOTE
3/20/2020: [facility]: Cath: LM: Distal 30%. LAD: Osteal 80%. Mid 70%. LCX: Mid 80%. RCA: Prox 70%. Mid 80%. RCA: VIJAYA 2.5 x 15 mm & 2.5 x 15 mm. LCX: VIJAYA 2.25 x 15 mm. LAD: VIJAYA 2.5 x 12 mm. 2.25 x 18 mm.                                    Robyn Little M.D.

## 2020-03-20 NOTE — PROGRESS NOTES
[facility]  Cardiology  Progress Note    Patient Name: Jamee Purvis  MRN: [id number]  Admission Date: 3/18/2020  Hospital Length of Stay: 1 days  Code Status: Full Code   Attending Physician: Mio Montejo MD   Primary Care Physician: [facility]  Expected Discharge Date:   Principal Problem:Non-ST elevated myocardial infarction (non-STEMI)    Subjective:     Brief HPI:    Jamee Purvis is a 64 y.o.female with diabetes mellitus, type 2. She is overweight. Beginning about 3/11/2020 she has felt jaw pain on and off with mild SOB. She thought she had a sinus issues and was seen in the ER. He occasional jaw pain persisted. In the evening of 3/18/2020 the jaw pain was followed by moderate chest tightness. She became concerned and presented to the ER. She was admitted.       Hospital Course:    3/20/2020: [facility]: Cath: LM: Distal 30%. LAD: Osteal 80%. Mid 70%. LCX: Mid 80%. RCA: Prox 70%. Mid 80%. RCA: VIJAYA 2.5 x 15 mm & 2.5 x 15 mm. LCX: VIJAYA 2.25 x 15 mm. LAD: VIJAYA 2.5 x 12 mm. 2.25 x 18 mm.     Interval History:    No further CP. No SOB.    Review of Systems   Constitution: Negative for chills, fever and malaise/fatigue.   HENT: Positive for congestion. Negative for nosebleeds.    Eyes: Negative for vision loss in left eye and vision loss in right eye.   Cardiovascular: Negative for chest pain, leg swelling, orthopnea, palpitations and paroxysmal nocturnal dyspnea.   Respiratory: Negative for cough, hemoptysis, shortness of breath, sputum production and wheezing.    Hematologic/Lymphatic: Negative for bleeding problem.   Skin: Negative for rash.   Musculoskeletal: Negative for myalgias.   Gastrointestinal: Negative for abdominal pain, heartburn, hematemesis, hematochezia, jaundice, melena, nausea and vomiting.   Genitourinary: Negative for hematuria.   Neurological: Negative for dizziness, headaches, light-headedness, vertigo and weakness.   Psychiatric/Behavioral:  Negative for altered mental status. The patient is not nervous/anxious.    Allergic/Immunologic: Negative for persistent infections.     Objective:     Vital Signs (Most Recent):  Temp: 97.4 °F (36.3 °C) (03/20/20 1030)  Pulse: 77 (03/20/20 1330)  Resp: 18 (03/20/20 1330)  BP: 127/66 (03/20/20 1330)  SpO2: 100 % (03/20/20 1330) Vital Signs (24h Range):  Temp:  [97.4 °F (36.3 °C)-98.6 °F (37 °C)] 97.4 °F (36.3 °C)  Pulse:  [56-88] 77  Resp:  [14-18] 18  SpO2:  [95 %-100 %] 100 %  BP: (100-129)/(59-74) 127/66     Weight: 70.2 kg (154 lb 13.2 oz)  Body mass index is 26.58 kg/m².    SpO2: 100 %  O2 Device (Oxygen Therapy): room air      Intake/Output Summary (Last 24 hours) at 3/20/2020 1423  Last data filed at 3/20/2020 0706  Gross per 24 hour   Intake --   Output 250 ml   Net -250 ml       Lines/Drains/Airways     Peripheral Intravenous Line                 Peripheral IV - Single Lumen 03/19/20 0125 22 G Left Antecubital 1 day                Physical Exam   Constitutional: She is oriented to person, place, and time. She appears well-developed and well-nourished.  Non-toxic appearance. No distress.   HENT:   Head: Normocephalic and atraumatic.   Nose: Nose normal.   Eyes: Right eye exhibits no discharge. Left eye exhibits no discharge. Right conjunctiva is not injected. Left conjunctiva is not injected. Right pupil is round. Left pupil is round. Pupils are equal.   Neck: Neck supple. No JVD present. Carotid bruit is not present. No thyromegaly present.   Cardiovascular: Normal rate, regular rhythm, S1 normal and S2 normal.  No extrasystoles are present. PMI is not displaced. Exam reveals no gallop.   Pulses:       Radial pulses are 2+ on the right side, and 2+ on the left side.        Femoral pulses are 2+ on the right side, and 2+ on the left side.       Dorsalis pedis pulses are 2+ on the right side, and 2+ on the left side.        Posterior tibial pulses are 2+ on the right side, and 2+ on the left side.    Pulmonary/Chest: Effort normal and breath sounds normal.   Abdominal: Soft. Normal appearance. There is no hepatosplenomegaly. There is no tenderness.   Musculoskeletal:        Right ankle: She exhibits no swelling, no ecchymosis and no deformity.        Left ankle: She exhibits no swelling, no ecchymosis and no deformity.   Lymphadenopathy:        Head (right side): No submandibular adenopathy present.        Head (left side): No submandibular adenopathy present.     She has no cervical adenopathy.   Neurological: She is alert and oriented to person, place, and time. She is not disoriented. No cranial nerve deficit.   Skin: Skin is warm, dry and intact. No rash noted. She is not diaphoretic. No cyanosis. Nails show no clubbing.   Psychiatric: She has a normal mood and affect. Her speech is normal and behavior is normal. Judgment and thought content normal. Cognition and memory are normal.       Current Medications:     aspirin  81 mg Oral Daily    atorvastatin  40 mg Oral Daily    clopidogreL  75 mg Oral Daily    insulin detemir U-100  10 Units Subcutaneous Daily    nitroGLYCERIN 0.4 MG/HR TD PT24  1 patch Transdermal Daily     Current Laboratory Results:    Recent Results (from the past 24 hour(s))   POCT glucose    Collection Time: 03/19/20  5:48 PM   Result Value Ref Range    POCT Glucose 215 (H) 70 - 110 mg/dL   POCT glucose    Collection Time: 03/19/20  9:15 PM   Result Value Ref Range    POCT Glucose 222 (H) 70 - 110 mg/dL   Basic Metabolic Panel (BMP)    Collection Time: 03/20/20  3:41 AM   Result Value Ref Range    Sodium 135 (L) 136 - 145 mmol/L    Potassium 3.6 3.5 - 5.1 mmol/L    Chloride 101 95 - 110 mmol/L    CO2 25 23 - 29 mmol/L    Glucose 242 (H) 70 - 110 mg/dL    BUN, Bld 13 8 - 23 mg/dL    Creatinine 0.7 0.5 - 1.4 mg/dL    Calcium 9.4 8.7 - 10.5 mg/dL    Anion Gap 9 8 - 16 mmol/L    eGFR if African American >60 >60 mL/min/1.73 m^2    eGFR if non African American >60 >60 mL/min/1.73 m^2    CBC with Automated Differential    Collection Time: 03/20/20  3:41 AM   Result Value Ref Range    WBC 6.33 3.90 - 12.70 K/uL    RBC 5.07 4.00 - 5.40 M/uL    Hemoglobin 13.2 12.0 - 16.0 g/dL    Hematocrit 41.0 37.0 - 48.5 %    Mean Corpuscular Volume 81 (L) 82 - 98 fL    Mean Corpuscular Hemoglobin 26.0 (L) 27.0 - 31.0 pg    Mean Corpuscular Hemoglobin Conc 32.2 32.0 - 36.0 g/dL    RDW 13.1 11.5 - 14.5 %    Platelets 153 150 - 350 K/uL    MPV 12.1 9.2 - 12.9 fL    Immature Granulocytes 0.2 0.0 - 0.5 %    Gran # (ANC) 2.9 1.8 - 7.7 K/uL    Immature Grans (Abs) 0.01 0.00 - 0.04 K/uL    Lymph # 2.6 1.0 - 4.8 K/uL    Mono # 0.6 0.3 - 1.0 K/uL    Eos # 0.2 0.0 - 0.5 K/uL    Baso # 0.02 0.00 - 0.20 K/uL    nRBC 0 0 /100 WBC    Gran% 46.1 38.0 - 73.0 %    Lymph% 41.7 18.0 - 48.0 %    Mono% 8.7 4.0 - 15.0 %    Eosinophil% 3.0 0.0 - 8.0 %    Basophil% 0.3 0.0 - 1.9 %    Differential Method Automated    POCT glucose    Collection Time: 03/20/20 10:39 AM   Result Value Ref Range    POCT Glucose 232 (H) 70 - 110 mg/dL     Current Imaging Results:    X-Ray Chest PA And Lateral   Final Result      No acute intrathoracic abnormality.         Electronically signed by: Mili Davis   Date:    03/19/2020   Time:    00:27          Assessment and Plan:     Problem List:    Active Diagnoses:    Diagnosis Date Noted POA    PRINCIPAL PROBLEM:  Non-ST elevated myocardial infarction (non-STEMI) [I21.4] 03/19/2020 Yes    Coronary artery disease [I25.10] 03/20/2020 Yes    History of coronary artery stent placement [Z95.5] 03/20/2020 Not Applicable    Diabetes mellitus, type 2 [E11.9] 03/19/2020 Yes    Overweight [E66.3] 03/20/2020 Yes      Problems Resolved During this Admission:     Assessment and Plan:     1. Coronary Artery Disease              3/11/2020: Began experience occasional pain in jaw.              3/18/2020: Jaw and chest pain for 60 minutes. NSTEMI. Troponin 2.2.              3/19/2020: Echo: Normal left ventricular  size and overall systolic function. Basal inferior hypokinesia. EF 60%. Moderate diastolic dysfunction. Mildly dilated LA.   3/20/2020: [facility]: Cath: LM: Distal 30%. LAD: Osteal 80%. Mid 70%. LCX: Mid 80%. RCA: Prox 70%. Mid 80%. RCA: VIJAYA 2.5 x 15 mm & 2.5 x 15 mm. LCX: VIJAYA 2.25 x 15 mm. LAD: VIJAYA 2.5 x 12 mm. 2.25 x 18 mm.    3/19/2020: Chol 171. HDL 58. . TG 59.   On atorvastatin 80 mg Q24.   On aspirin 81 mg Q24.   On clopidogrel 75 mg Q24 for 12 months to 4/1/2021.                 2. Diabetes Mellitus, Type 2              2006: Diagnosed. Complications: CAD. Medications: Oral agent.   3/19/2020: HgbA1C 13.2%.   Needs excellent control to reduce future risk for more cardiovascular events.     3. Overweight              3/19/2020: Weight 70 kg. BMI 27.     4. Primary Care              In transition.    VTE Risk Mitigation (From admission, onward)         Ordered     IP VTE LOW RISK PATIENT  Once      03/19/20 0255     Place sequential compression device  Until discontinued      03/19/20 0255                Robyn Little MD  Cardiology  [facility]

## 2020-03-20 NOTE — ASSESSMENT & PLAN NOTE
- -A1c 13.7  -Takes metformin at home  -Will certainly require insulin at discharge  -Consult diabetes educator  -Start detemir insulin and sliding scale insulin ac/hs.

## 2020-03-20 NOTE — ASSESSMENT & PLAN NOTE
- -A1c 13.7  -Takes metformin at home  -Will certainly require insulin at discharge  -Consult diabetes educator  -Increase detemir today  -Continue sliding scale insulin ac/hs.

## 2020-03-20 NOTE — ASSESSMENT & PLAN NOTE
-Ms. Jamee Purvis was admitted to inpatient status  -She was noted to have inferior st segment depression and positive troponins  -Cardiology was consulted and took her for angiogram on 3/20.  She received 1 stent to LCx, 2 stents to RCA and 2 stents to LAD.  -Echo showed EF 60%, grade II diastolic dysfunction and basilar hypokinesia  -Continue to treat with aspirin, plavix and statin.  -Avoiding beta-blocker inferior mi  -Possible discharge in next 24-48 hours.

## 2020-03-20 NOTE — PROGRESS NOTES
"[facility]  Hospital Medicine  Progress Note    Patient Name: Jamee Purvis  MRN: [id number]  Patient Class: IP- Inpatient   Admission Date: 3/18/2020  Length of Stay: 0 days  Attending Physician: Mio Montejo MD  Primary Care Provider: [place]        Subjective:     Principal Problem:NSTEMI (non-ST elevated myocardial infarction)        HPI:  Ms. Jamee Purvis is a 64 y.o. female, with PMH of NIDDM-2, who presented to [facility] ED on 3/18/20 with intermittent chest pain x 1 week. She states the chest pain begins in the b/l sides of her jaw, and travels downward as a "tightness" to the mid chest. She states the pain usually lasts about 1/2 an hour. Tonight the pain began 2.5 hours PTA. She states the pain occurs both with exertion and during rest, resoving on it's own. She denies diaphoresis, SOB, N/V. She was evaluated in the ED, and EKG showed St changes in inferior leads. And elevated troponin of 0.684. She was admitted to inpatient status.     Overview/Hospital Course:  No notes on file    No new subjective & objective note has been filed under this hospital service since the last note was generated.      Assessment/Plan:      * NSTEMI (non-ST elevated myocardial infarction)  -Ms. Jamee Purvis is admitted to inpatient status  -She is noted to have inferior st segment depression and positive troponins  -Await cardiology consultation  -Treat with aspirin and statin.  -Avoiding beta-blocker given possible inferior mi  -Likely to need angiogram.  -Order echo today.    Type 2 diabetes mellitus with hyperglycemia, without long-term current use of insulin  - -A1c 13.7  -Takes metformin at home  -Will certainly require insulin at discharge  -Consult diabetes educator  -Start detemir insulin and sliding scale insulin ac/hs.      VTE Risk Mitigation (From admission, onward)         Ordered     heparin (porcine) injection 5,000 Units  Every 8 hours      03/19/20 0255 "     IP VTE LOW RISK PATIENT  Once      03/19/20 0255     Place sequential compression device  Until discontinued      03/19/20 0255                      Mio Montejo MD  Department of Hospital Medicine   [facility]

## 2020-03-20 NOTE — SUBJECTIVE & OBJECTIVE
Interval History: No further pain this morning.  Denies shortness of breath.  Rather anxious.  All questions answered.    Review of Systems   Constitutional: Negative for activity change, chills, diaphoresis and fatigue.   HENT: Negative for congestion, drooling and hearing loss.    Eyes: Negative for discharge.   Respiratory: Positive for chest tightness and shortness of breath. Negative for apnea and wheezing.    Cardiovascular: Positive for chest pain. Negative for palpitations and leg swelling.   Gastrointestinal: Negative for abdominal distention, abdominal pain, constipation and diarrhea.   Musculoskeletal: Negative for arthralgias and gait problem.   Skin: Negative for rash.   Neurological: Negative for seizures, light-headedness and numbness.   Hematological: Negative for adenopathy.   Psychiatric/Behavioral: Negative for agitation and behavioral problems.     Objective:     Vital Signs (Most Recent):  Temp: 97.6 °F (36.4 °C) (03/19/20 2017)  Pulse: 69 (03/19/20 2017)  Resp: 18 (03/19/20 2017)  BP: 104/64 (03/19/20 2017)  SpO2: 98 % (03/19/20 2017) Vital Signs (24h Range):  Temp:  [97.4 °F (36.3 °C)-98.7 °F (37.1 °C)] 97.6 °F (36.4 °C)  Pulse:  [54-88] 69  Resp:  [14-19] 18  SpO2:  [97 %-100 %] 98 %  BP: (104-196)/(64-89) 104/64     Weight: 70.2 kg (154 lb 13.2 oz)  Body mass index is 26.58 kg/m².    Intake/Output Summary (Last 24 hours) at 3/19/2020 2035  Last data filed at 3/19/2020 0600  Gross per 24 hour   Intake 1120 ml   Output --   Net 1120 ml      Physical Exam   Constitutional: She is oriented to person, place, and time. She appears well-developed and well-nourished. No distress.   HENT:   Head: Normocephalic and atraumatic.   Eyes: Pupils are equal, round, and reactive to light. Conjunctivae and EOM are normal. No scleral icterus.   Neck: Normal range of motion. Neck supple. No tracheal deviation present.   Cardiovascular: Normal rate, regular rhythm, normal heart sounds and intact distal pulses.  Exam reveals no gallop and no friction rub.   No murmur heard.  Pulmonary/Chest: Effort normal and breath sounds normal. No stridor. No respiratory distress. She has no wheezes. She has no rales.   Abdominal: Soft. Bowel sounds are normal. She exhibits no distension. There is no tenderness. There is no guarding.   Musculoskeletal: Normal range of motion. She exhibits no deformity.   Neurological: She is alert and oriented to person, place, and time. No cranial nerve deficit. She exhibits normal muscle tone.   Skin: Skin is warm and dry. She is not diaphoretic.   Psychiatric: She has a normal mood and affect. Her behavior is normal. Judgment and thought content normal.   Nursing note and vitals reviewed.      Significant Labs: All pertinent labs within the past 24 hours have been reviewed.    Significant Imaging: I have reviewed and interpreted all pertinent imaging results/findings within the past 24 hours.

## 2020-03-20 NOTE — PROGRESS NOTES
1400 cath lab tech at bedside to remove sheath    1430 cath lab finished removing sheath from patient right groin. Assessment to groin shows no hematoma . Pt pulse palpable +2 right dorsalis Pedis. Pt has no c/o pain, numbness, or tingling

## 2020-03-20 NOTE — PROGRESS NOTES
"[facility] Hospital Medicine  Progress Note    Patient Name: Jamee Purvis  MRN: [id number]  Patient Class: IP- Inpatient   Admission Date: 3/18/2020  Length of Stay: 1 days  Attending Physician: Mio Montejo MD  Primary Care Provider: [facility]        Subjective:     Principal Problem:Non-ST elevated myocardial infarction (non-STEMI)        HPI:  Ms. Jamee Purvis is a 64 y.o. female, with PMH of NIDDM-2, who [facility] [place] [facility] 3/18/20 with intermittent chest pain x 1 week. She states the chest pain begins in the b/l sides of her jaw, and travels downward as a "tightness" to the mid chest. She states the pain usually lasts about 1/2 an hour. Tonight the pain began 2.5 hours PTA. She states the pain occurs both with exertion and during rest, resoving on it's own. She denies diaphoresis, SOB, N/V. She was evaluated in the ED, and EKG showed St changes in inferior leads. And elevated troponin of 0.684. She was admitted to inpatient status.     Overview/Hospital Course:  No notes on file    Interval History: No acute events overnight.  Taken for angiogram this morning and received stenting.  Now in PACU resting comfortably.  All questions answered.    Review of Systems   Constitutional: Negative for activity change, chills, diaphoresis and fatigue.   HENT: Negative for congestion, drooling and hearing loss.    Eyes: Negative for discharge.   Respiratory: Positive for chest tightness and shortness of breath. Negative for apnea and wheezing.    Cardiovascular: Positive for chest pain. Negative for palpitations and leg swelling.   Gastrointestinal: Negative for abdominal distention, abdominal pain, constipation and diarrhea.   Musculoskeletal: Negative for arthralgias and gait problem.   Skin: Negative for rash.   Neurological: Negative for seizures, light-headedness and numbness.   Hematological: Negative for adenopathy.   Psychiatric/Behavioral: Negative for " agitation and behavioral problems.     Objective:     Vital Signs (Most Recent):  Temp: 97.4 °F (36.3 °C) (03/20/20 1030)  Pulse: 77 (03/20/20 1530)  Resp: 16 (03/20/20 1530)  BP: 120/66 (03/20/20 1530)  SpO2: 100 % (03/20/20 1530) Vital Signs (24h Range):  Temp:  [97.4 °F (36.3 °C)-98.6 °F (37 °C)] 97.4 °F (36.3 °C)  Pulse:  [56-88] 77  Resp:  [14-18] 16  SpO2:  [95 %-100 %] 100 %  BP: (100-129)/(59-74) 120/66     Weight: 70.2 kg (154 lb 13.2 oz)  Body mass index is 26.58 kg/m².    Intake/Output Summary (Last 24 hours) at 3/20/2020 1610  Last data filed at 3/20/2020 0706  Gross per 24 hour   Intake --   Output 250 ml   Net -250 ml      Physical Exam   Constitutional: She is oriented to person, place, and time. She appears well-developed and well-nourished. No distress.   HENT:   Head: Normocephalic and atraumatic.   Eyes: Pupils are equal, round, and reactive to light. Conjunctivae and EOM are normal. No scleral icterus.   Neck: Normal range of motion. Neck supple. No tracheal deviation present.   Cardiovascular: Normal rate, regular rhythm, normal heart sounds and intact distal pulses. Exam reveals no gallop and no friction rub.   No murmur heard.  Pulmonary/Chest: Effort normal and breath sounds normal. No stridor. No respiratory distress. She has no wheezes. She has no rales.   Abdominal: Soft. Bowel sounds are normal. She exhibits no distension. There is no tenderness. There is no guarding.   Musculoskeletal: Normal range of motion. She exhibits no deformity.   Neurological: She is alert and oriented to person, place, and time. No cranial nerve deficit. She exhibits normal muscle tone.   Skin: Skin is warm and dry. She is not diaphoretic.   Psychiatric: She has a normal mood and affect. Her behavior is normal. Judgment and thought content normal.   Nursing note and vitals reviewed.      Significant Labs: All pertinent labs within the past 24 hours have been reviewed.    Significant Imaging: I have reviewed and  interpreted all pertinent imaging results/findings within the past 24 hours.      Assessment/Plan:      * Non-ST elevated myocardial infarction (non-STEMI)  -Ms. Jamee Purvis was admitted to inpatient status  -She was noted to have inferior st segment depression and positive troponins  -Cardiology was consulted and took her for angiogram on 3/20.  She received 1 stent to LCx, 2 stents to RCA and 2 stents to LAD.  -Echo showed EF 60%, grade II diastolic dysfunction and basilar hypokinesia  -Continue to treat with aspirin, plavix and statin.  -Avoiding beta-blocker inferior mi  -Possible discharge in next 24-48 hours.    Coronary artery disease  -Treatment as above.      History of coronary artery stent placement        Diabetes mellitus, type 2  - -A1c 13.7  -Takes metformin at home  -Will certainly require insulin at discharge  -Consult diabetes educator  -Increase detemir today  -Continue sliding scale insulin ac/hs.      VTE Risk Mitigation (From admission, onward)         Ordered     IP VTE LOW RISK PATIENT  Once      03/19/20 0255     Place sequential compression device  Until discontinued      03/19/20 0255                      Mio Montejo MD  Department of Hospital Medicine   [facility]

## 2020-03-20 NOTE — SUBJECTIVE & OBJECTIVE
Interval History: No acute events overnight.  Taken for angiogram this morning and received stenting.  Now in PACU resting comfortably.  All questions answered.    Review of Systems   Constitutional: Negative for activity change, chills, diaphoresis and fatigue.   HENT: Negative for congestion, drooling and hearing loss.    Eyes: Negative for discharge.   Respiratory: Positive for chest tightness and shortness of breath. Negative for apnea and wheezing.    Cardiovascular: Positive for chest pain. Negative for palpitations and leg swelling.   Gastrointestinal: Negative for abdominal distention, abdominal pain, constipation and diarrhea.   Musculoskeletal: Negative for arthralgias and gait problem.   Skin: Negative for rash.   Neurological: Negative for seizures, light-headedness and numbness.   Hematological: Negative for adenopathy.   Psychiatric/Behavioral: Negative for agitation and behavioral problems.     Objective:     Vital Signs (Most Recent):  Temp: 97.4 °F (36.3 °C) (03/20/20 1030)  Pulse: 77 (03/20/20 1530)  Resp: 16 (03/20/20 1530)  BP: 120/66 (03/20/20 1530)  SpO2: 100 % (03/20/20 1530) Vital Signs (24h Range):  Temp:  [97.4 °F (36.3 °C)-98.6 °F (37 °C)] 97.4 °F (36.3 °C)  Pulse:  [56-88] 77  Resp:  [14-18] 16  SpO2:  [95 %-100 %] 100 %  BP: (100-129)/(59-74) 120/66     Weight: 70.2 kg (154 lb 13.2 oz)  Body mass index is 26.58 kg/m².    Intake/Output Summary (Last 24 hours) at 3/20/2020 1610  Last data filed at 3/20/2020 0706  Gross per 24 hour   Intake --   Output 250 ml   Net -250 ml      Physical Exam   Constitutional: She is oriented to person, place, and time. She appears well-developed and well-nourished. No distress.   HENT:   Head: Normocephalic and atraumatic.   Eyes: Pupils are equal, round, and reactive to light. Conjunctivae and EOM are normal. No scleral icterus.   Neck: Normal range of motion. Neck supple. No tracheal deviation present.   Cardiovascular: Normal rate, regular rhythm, normal  heart sounds and intact distal pulses. Exam reveals no gallop and no friction rub.   No murmur heard.  Pulmonary/Chest: Effort normal and breath sounds normal. No stridor. No respiratory distress. She has no wheezes. She has no rales.   Abdominal: Soft. Bowel sounds are normal. She exhibits no distension. There is no tenderness. There is no guarding.   Musculoskeletal: Normal range of motion. She exhibits no deformity.   Neurological: She is alert and oriented to person, place, and time. No cranial nerve deficit. She exhibits normal muscle tone.   Skin: Skin is warm and dry. She is not diaphoretic.   Psychiatric: She has a normal mood and affect. Her behavior is normal. Judgment and thought content normal.   Nursing note and vitals reviewed.      Significant Labs: All pertinent labs within the past 24 hours have been reviewed.    Significant Imaging: I have reviewed and interpreted all pertinent imaging results/findings within the past 24 hours.

## 2020-03-21 LAB
ANION GAP SERPL CALC-SCNC: 11 MMOL/L (ref 8–16)
ANION GAP SERPL CALC-SCNC: 11 MMOL/L (ref 8–16)
BASOPHILS # BLD AUTO: 0.02 K/UL (ref 0–0.2)
BASOPHILS # BLD AUTO: 0.02 K/UL (ref 0–0.2)
BASOPHILS NFR BLD: 0.2 % (ref 0–1.9)
BASOPHILS NFR BLD: 0.2 % (ref 0–1.9)
BUN SERPL-MCNC: 12 MG/DL (ref 8–23)
BUN SERPL-MCNC: 12 MG/DL (ref 8–23)
CALCIUM SERPL-MCNC: 8.9 MG/DL (ref 8.7–10.5)
CALCIUM SERPL-MCNC: 8.9 MG/DL (ref 8.7–10.5)
CHLORIDE SERPL-SCNC: 103 MMOL/L (ref 95–110)
CHLORIDE SERPL-SCNC: 103 MMOL/L (ref 95–110)
CO2 SERPL-SCNC: 20 MMOL/L (ref 23–29)
CO2 SERPL-SCNC: 20 MMOL/L (ref 23–29)
CREAT SERPL-MCNC: 0.7 MG/DL (ref 0.5–1.4)
CREAT SERPL-MCNC: 0.7 MG/DL (ref 0.5–1.4)
DIFFERENTIAL METHOD: ABNORMAL
DIFFERENTIAL METHOD: ABNORMAL
EOSINOPHIL # BLD AUTO: 0.1 K/UL (ref 0–0.5)
EOSINOPHIL # BLD AUTO: 0.1 K/UL (ref 0–0.5)
EOSINOPHIL NFR BLD: 1.7 % (ref 0–8)
EOSINOPHIL NFR BLD: 1.7 % (ref 0–8)
ERYTHROCYTE [DISTWIDTH] IN BLOOD BY AUTOMATED COUNT: 13 % (ref 11.5–14.5)
ERYTHROCYTE [DISTWIDTH] IN BLOOD BY AUTOMATED COUNT: 13 % (ref 11.5–14.5)
EST. GFR  (AFRICAN AMERICAN): >60 ML/MIN/1.73 M^2
EST. GFR  (AFRICAN AMERICAN): >60 ML/MIN/1.73 M^2
EST. GFR  (NON AFRICAN AMERICAN): >60 ML/MIN/1.73 M^2
EST. GFR  (NON AFRICAN AMERICAN): >60 ML/MIN/1.73 M^2
GLUCOSE SERPL-MCNC: 175 MG/DL (ref 70–110)
GLUCOSE SERPL-MCNC: 175 MG/DL (ref 70–110)
HCT VFR BLD AUTO: 40 % (ref 37–48.5)
HCT VFR BLD AUTO: 40 % (ref 37–48.5)
HGB BLD-MCNC: 12.7 G/DL (ref 12–16)
HGB BLD-MCNC: 12.7 G/DL (ref 12–16)
IMM GRANULOCYTES # BLD AUTO: 0.02 K/UL (ref 0–0.04)
IMM GRANULOCYTES # BLD AUTO: 0.02 K/UL (ref 0–0.04)
IMM GRANULOCYTES NFR BLD AUTO: 0.2 % (ref 0–0.5)
IMM GRANULOCYTES NFR BLD AUTO: 0.2 % (ref 0–0.5)
LYMPHOCYTES # BLD AUTO: 2.5 K/UL (ref 1–4.8)
LYMPHOCYTES # BLD AUTO: 2.5 K/UL (ref 1–4.8)
LYMPHOCYTES NFR BLD: 30.5 % (ref 18–48)
LYMPHOCYTES NFR BLD: 30.5 % (ref 18–48)
MCH RBC QN AUTO: 26 PG (ref 27–31)
MCH RBC QN AUTO: 26 PG (ref 27–31)
MCHC RBC AUTO-ENTMCNC: 31.8 G/DL (ref 32–36)
MCHC RBC AUTO-ENTMCNC: 31.8 G/DL (ref 32–36)
MCV RBC AUTO: 82 FL (ref 82–98)
MCV RBC AUTO: 82 FL (ref 82–98)
MONOCYTES # BLD AUTO: 0.8 K/UL (ref 0.3–1)
MONOCYTES # BLD AUTO: 0.8 K/UL (ref 0.3–1)
MONOCYTES NFR BLD: 10.2 % (ref 4–15)
MONOCYTES NFR BLD: 10.2 % (ref 4–15)
NEUTROPHILS # BLD AUTO: 4.7 K/UL (ref 1.8–7.7)
NEUTROPHILS # BLD AUTO: 4.7 K/UL (ref 1.8–7.7)
NEUTROPHILS NFR BLD: 57.2 % (ref 38–73)
NEUTROPHILS NFR BLD: 57.2 % (ref 38–73)
NRBC BLD-RTO: 0 /100 WBC
NRBC BLD-RTO: 0 /100 WBC
PLATELET # BLD AUTO: 149 K/UL (ref 150–350)
PLATELET # BLD AUTO: 149 K/UL (ref 150–350)
PMV BLD AUTO: 12.1 FL (ref 9.2–12.9)
PMV BLD AUTO: 12.1 FL (ref 9.2–12.9)
POCT GLUCOSE: 158 MG/DL (ref 70–110)
POCT GLUCOSE: 216 MG/DL (ref 70–110)
POCT GLUCOSE: 231 MG/DL (ref 70–110)
POCT GLUCOSE: 231 MG/DL (ref 70–110)
POTASSIUM SERPL-SCNC: 4.2 MMOL/L (ref 3.5–5.1)
POTASSIUM SERPL-SCNC: 4.2 MMOL/L (ref 3.5–5.1)
RBC # BLD AUTO: 4.88 M/UL (ref 4–5.4)
RBC # BLD AUTO: 4.88 M/UL (ref 4–5.4)
SODIUM SERPL-SCNC: 134 MMOL/L (ref 136–145)
SODIUM SERPL-SCNC: 134 MMOL/L (ref 136–145)
WBC # BLD AUTO: 8.24 K/UL (ref 3.9–12.7)
WBC # BLD AUTO: 8.24 K/UL (ref 3.9–12.7)

## 2020-03-21 PROCEDURE — 25000003 PHARM REV CODE 250: Performed by: HOSPITALIST

## 2020-03-21 PROCEDURE — 36415 COLL VENOUS BLD VENIPUNCTURE: CPT

## 2020-03-21 PROCEDURE — 80048 BASIC METABOLIC PNL TOTAL CA: CPT

## 2020-03-21 PROCEDURE — 25000003 PHARM REV CODE 250: Performed by: INTERNAL MEDICINE

## 2020-03-21 PROCEDURE — 63600175 PHARM REV CODE 636 W HCPCS: Performed by: HOSPITALIST

## 2020-03-21 PROCEDURE — C9399 UNCLASSIFIED DRUGS OR BIOLOG: HCPCS | Performed by: HOSPITALIST

## 2020-03-21 PROCEDURE — 99232 SBSQ HOSP IP/OBS MODERATE 35: CPT | Mod: ,,, | Performed by: HOSPITALIST

## 2020-03-21 PROCEDURE — 85025 COMPLETE CBC W/AUTO DIFF WBC: CPT

## 2020-03-21 PROCEDURE — 11000001 HC ACUTE MED/SURG PRIVATE ROOM

## 2020-03-21 PROCEDURE — 99232 PR SUBSEQUENT HOSPITAL CARE,LEVL II: ICD-10-PCS | Mod: ,,, | Performed by: HOSPITALIST

## 2020-03-21 RX ORDER — BENAZEPRIL HYDROCHLORIDE 5 MG/1
5 TABLET ORAL DAILY
Status: DISCONTINUED | OUTPATIENT
Start: 2020-03-21 | End: 2020-03-23 | Stop reason: HOSPADM

## 2020-03-21 RX ADMIN — INSULIN DETEMIR 15 UNITS: 100 INJECTION, SOLUTION SUBCUTANEOUS at 09:03

## 2020-03-21 RX ADMIN — INSULIN ASPART 1 UNITS: 100 INJECTION, SOLUTION INTRAVENOUS; SUBCUTANEOUS at 09:03

## 2020-03-21 RX ADMIN — ACETAMINOPHEN 650 MG: 325 TABLET ORAL at 12:03

## 2020-03-21 RX ADMIN — INSULIN ASPART 2 UNITS: 100 INJECTION, SOLUTION INTRAVENOUS; SUBCUTANEOUS at 09:03

## 2020-03-21 RX ADMIN — CLOPIDOGREL BISULFATE 75 MG: 75 TABLET, FILM COATED ORAL at 09:03

## 2020-03-21 RX ADMIN — INSULIN ASPART 2 UNITS: 100 INJECTION, SOLUTION INTRAVENOUS; SUBCUTANEOUS at 04:03

## 2020-03-21 RX ADMIN — BENAZEPRIL HYDROCHLORIDE 5 MG: 5 TABLET ORAL at 09:03

## 2020-03-21 RX ADMIN — ATORVASTATIN CALCIUM 80 MG: 20 TABLET, FILM COATED ORAL at 09:03

## 2020-03-21 RX ADMIN — ACETAMINOPHEN 650 MG: 325 TABLET ORAL at 07:03

## 2020-03-21 RX ADMIN — ASPIRIN 81 MG: 81 TABLET, COATED ORAL at 09:03

## 2020-03-21 NOTE — NURSING
Dr. Little @ bedside to assess pt, Tegaderm to Right groin removed per Dr. Little. Transfer orders to transfer pt to inpatient unit noted.

## 2020-03-21 NOTE — NURSING
Report received from SIDRA Fountain RN and pt care assumed. Pt resting in bed with eyes closed, aroused easily to verbal stimuli. No c/o pain or nausea @ present. Right groin dressing remains clean, dry and intact with no bleeding or hematoma noted. Monitors in place

## 2020-03-21 NOTE — NURSING
Pt AAOx4, NAD noted. Vitals monitored and stable. Glucose monitored and managed with insulin. Pt's only complaint was of R arm pain due to previous blood pressures being taken in that arm, given Tylenol. Pt denied chest pain, SOB, N/V/D. Pt tolerated diabetic/cardiac diet, no N/V reported. Pt able to ambulate independently, remained free from falls or injury. Pt currently in bed, safety measures implemented. Will continue to monitor.

## 2020-03-21 NOTE — PROGRESS NOTES
"[facility]  Hospital Medicine  Progress Note    Patient Name: Jamee Purvis  MRN: [id number]  Patient Class: IP- Inpatient   Admission Date: 3/18/2020  Length of Stay: 2 days  Attending Physician: Mio Montejo MD  Primary Care Provider: [facility]        Subjective:     Principal Problem:Non-ST elevated myocardial infarction (non-STEMI)        HPI:  Ms. Jamee Purvis is a 64 y.o. female, with PMH of NIDDM-2, who presented to [facility] ED on 3/18/20 with intermittent chest pain x 1 week. She states the chest pain begins in the b/l sides of her jaw, and travels downward as a "tightness" to the mid chest. She states the pain usually lasts about 1/2 an hour. Tonight the pain began 2.5 hours PTA. She states the pain occurs both with exertion and during rest, resoving on it's own. She denies diaphoresis, SOB, N/V. She was evaluated in the ED, and EKG showed St changes in inferior leads. And elevated troponin of 0.684. She was admitted to inpatient status.     Overview/Hospital Course:  No notes on file    Interval History: No acute events overnight.  Denies pain and sob.  All questions answered.    Review of Systems   Constitutional: Negative for activity change, chills, diaphoresis and fatigue.   HENT: Negative for congestion, drooling and hearing loss.    Eyes: Negative for discharge.   Respiratory: Negative for apnea, chest tightness, shortness of breath and wheezing.    Cardiovascular: Negative for chest pain, palpitations and leg swelling.   Gastrointestinal: Negative for abdominal distention, abdominal pain, constipation and diarrhea.   Musculoskeletal: Negative for arthralgias and gait problem.   Skin: Negative for rash.   Neurological: Negative for seizures, light-headedness and numbness.   Hematological: Negative for adenopathy.   Psychiatric/Behavioral: Negative for agitation and behavioral problems.     Objective:     Vital Signs (Most Recent):  Temp: 98.4 °F " (36.9 °C) (03/21/20 1250)  Pulse: 64 (03/21/20 1250)  Resp: 18 (03/21/20 1250)  BP: 109/70 (03/21/20 1250)  SpO2: 99 % (03/21/20 1250) Vital Signs (24h Range):  Temp:  [98.1 °F (36.7 °C)-99.6 °F (37.6 °C)] 98.4 °F (36.9 °C)  Pulse:  [56-87] 64  Resp:  [16-18] 18  SpO2:  [96 %-100 %] 99 %  BP: (104-136)/(56-79) 109/70     Weight: 70.2 kg (154 lb 13.2 oz)  Body mass index is 26.58 kg/m².    Intake/Output Summary (Last 24 hours) at 3/21/2020 1330  Last data filed at 3/21/2020 1000  Gross per 24 hour   Intake 1300 ml   Output 600 ml   Net 700 ml      Physical Exam   Constitutional: She is oriented to person, place, and time. She appears well-developed and well-nourished. No distress.   HENT:   Head: Normocephalic and atraumatic.   Eyes: Pupils are equal, round, and reactive to light. Conjunctivae and EOM are normal. No scleral icterus.   Neck: Normal range of motion. Neck supple. No tracheal deviation present.   Cardiovascular: Normal rate, regular rhythm, normal heart sounds and intact distal pulses. Exam reveals no gallop and no friction rub.   No murmur heard.  Pulmonary/Chest: Effort normal and breath sounds normal. No stridor. No respiratory distress. She has no wheezes. She has no rales.   Abdominal: Soft. Bowel sounds are normal. She exhibits no distension. There is no tenderness. There is no guarding.   Musculoskeletal: Normal range of motion. She exhibits no deformity.   Neurological: She is alert and oriented to person, place, and time. No cranial nerve deficit. She exhibits normal muscle tone.   Skin: Skin is warm and dry. She is not diaphoretic.   Psychiatric: She has a normal mood and affect. Her behavior is normal. Judgment and thought content normal.   Nursing note and vitals reviewed.      Significant Labs: All pertinent labs within the past 24 hours have been reviewed.    Significant Imaging: I have reviewed and interpreted all pertinent imaging results/findings within the past 24  hours.      Assessment/Plan:      * Non-ST elevated myocardial infarction (non-STEMI)  -Ms. Jamee Purvis was admitted to inpatient status  -She was noted to have inferior st segment depression and positive troponins  -Cardiology was consulted and took her for angiogram on 3/20 which revealed triple vessel disease.  She received 1 stent to LCx, 2 stents to RCA and 2 stents to LAD.  -Echo showed EF 60%, grade II diastolic dysfunction and basilar hypokinesia  -Continue to treat with aspirin, plavix and statin.  -Avoiding beta-blocker inferior mi  -Discussed with Dr. Little and from a cardiac standpoint she is medically stable for discharge.    Coronary artery disease  -Treatment as above.      History of coronary artery stent placement        Diabetes mellitus, type 2  -A1c 13.7  -Takes metformin at home  -Will certainly require insulin at discharge  -Consult diabetes educator  -Increase detemir today  -Continue sliding scale insulin ac/hs.  -Ask that nurse begin teaching insulin administration and glucose monitoring      VTE Risk Mitigation (From admission, onward)         Ordered     IP VTE LOW RISK PATIENT  Once      03/19/20 0255     Place sequential compression device  Until discontinued      03/19/20 0255                      Mio Montejo MD  Department of Hospital Medicine   [facility]

## 2020-03-21 NOTE — NURSING
Pt AAOx3, sitting up in bed eating breakfast. No c/o pain or nausea @ present and VSS, ready for transfer to inpatient room. Family updated per pt. Personal belongings in bag x 2 @ bedside and cell phone in hand. Report given to MARY Bowling

## 2020-03-21 NOTE — PROGRESS NOTES
[facility]  Cardiology  Progress Note    Patient Name: Jamee Purvis  MRN: [id number]  Admission Date: 3/18/2020  Hospital Length of Stay: 2 days  Code Status: Full Code   Attending Physician: Mio Montejo MD   Primary Care Physician: [facility]  Expected Discharge Date:   Principal Problem:Non-ST elevated myocardial infarction (non-STEMI)    Subjective:     Brief HPI:    Jamee Purvis is a 64 y.o.female with diabetes mellitus, type 2. She is overweight. Beginning about 3/11/2020 she has felt jaw pain on and off with mild SOB. She thought she had a sinus issues and was seen in the ER. He occasional jaw pain persisted. In the evening of 3/18/2020 the jaw pain was followed by moderate chest tightness. She became concerned and presented to the ER. She was admitted.       Hospital Course:    3/20/2020: [facility]: Cath: LM: Distal 30%. LAD: Osteal 80%. Mid 70%. LCX: Mid 80%. RCA: Prox 70%. Mid 80%. RCA: VIJAYA 2.5 x 15 mm & 2.5 x 15 mm. LCX: VIJAYA 2.25 x 15 mm. LAD: VIJAYA 2.5 x 12 mm. 2.25 x 18 mm.     Interval History:    No further CP. No SOB.    Review of Systems   Constitution: Negative for chills, fever and malaise/fatigue.   HENT: Negative for congestion and nosebleeds.    Eyes: Negative for vision loss in left eye and vision loss in right eye.   Cardiovascular: Negative for chest pain, leg swelling, orthopnea, palpitations and paroxysmal nocturnal dyspnea.   Respiratory: Negative for cough, hemoptysis, shortness of breath, sputum production and wheezing.    Hematologic/Lymphatic: Negative for bleeding problem.   Skin: Negative for rash.   Musculoskeletal: Negative for myalgias.   Gastrointestinal: Negative for abdominal pain, heartburn, hematemesis, hematochezia, jaundice, melena, nausea and vomiting.   Genitourinary: Negative for hematuria.   Neurological: Negative for dizziness, headaches, light-headedness, vertigo and weakness.   Psychiatric/Behavioral: Negative for  altered mental status. The patient is not nervous/anxious.    Allergic/Immunologic: Negative for persistent infections.     Objective:     Vital Signs (Most Recent):  Temp: 98.7 °F (37.1 °C) (03/21/20 0715)  Pulse: 60 (03/21/20 0900)  Resp: 16 (03/21/20 0715)  BP: 112/63 (03/21/20 0900)  SpO2: 96 % (03/21/20 0900) Vital Signs (24h Range):  Temp:  [97.4 °F (36.3 °C)-99.6 °F (37.6 °C)] 98.7 °F (37.1 °C)  Pulse:  [56-82] 60  Resp:  [16-18] 16  SpO2:  [96 %-100 %] 96 %  BP: (104-136)/(56-79) 112/63     Weight: 70.2 kg (154 lb 13.2 oz)  Body mass index is 26.58 kg/m².    SpO2: 96 %  O2 Device (Oxygen Therapy): room air      Intake/Output Summary (Last 24 hours) at 3/21/2020 0925  Last data filed at 3/21/2020 0625  Gross per 24 hour   Intake 1060 ml   Output 600 ml   Net 460 ml       Lines/Drains/Airways     Peripheral Intravenous Line                 Peripheral IV - Single Lumen 03/19/20 0125 22 G Left Antecubital 2 days                Physical Exam   Constitutional: She is oriented to person, place, and time. She appears well-developed and well-nourished.  Non-toxic appearance. No distress.   HENT:   Head: Normocephalic and atraumatic.   Nose: Nose normal.   Eyes: Right eye exhibits no discharge. Left eye exhibits no discharge. Right conjunctiva is not injected. Left conjunctiva is not injected. Right pupil is round. Left pupil is round. Pupils are equal.   Neck: Neck supple. No JVD present. Carotid bruit is not present. No thyromegaly present.   Cardiovascular: Normal rate, regular rhythm, S1 normal and S2 normal.  No extrasystoles are present. PMI is not displaced. Exam reveals no gallop.   Pulses:       Radial pulses are 2+ on the right side, and 2+ on the left side.        Femoral pulses are 2+ on the right side, and 2+ on the left side.       Dorsalis pedis pulses are 2+ on the right side, and 2+ on the left side.        Posterior tibial pulses are 2+ on the right side, and 2+ on the left side.   Right groin fine.    Pulmonary/Chest: Effort normal and breath sounds normal.   Abdominal: Soft. Normal appearance. There is no hepatosplenomegaly. There is no tenderness.   Musculoskeletal:        Right ankle: She exhibits no swelling, no ecchymosis and no deformity.        Left ankle: She exhibits no swelling, no ecchymosis and no deformity.   Lymphadenopathy:        Head (right side): No submandibular adenopathy present.        Head (left side): No submandibular adenopathy present.     She has no cervical adenopathy.   Neurological: She is alert and oriented to person, place, and time. She is not disoriented. No cranial nerve deficit.   Skin: Skin is warm, dry and intact. No rash noted. She is not diaphoretic. No cyanosis. Nails show no clubbing.   Psychiatric: She has a normal mood and affect. Her speech is normal and behavior is normal. Judgment and thought content normal. Cognition and memory are normal.       Current Medications:     aspirin  81 mg Oral Daily    atorvastatin  80 mg Oral Daily    clopidogreL  75 mg Oral Daily    insulin detemir U-100  15 Units Subcutaneous Daily     Current Laboratory Results:    Recent Results (from the past 24 hour(s))   POCT glucose    Collection Time: 03/20/20 10:39 AM   Result Value Ref Range    POCT Glucose 232 (H) 70 - 110 mg/dL   POCT glucose    Collection Time: 03/20/20  7:02 PM   Result Value Ref Range    POCT Glucose 213 (H) 70 - 110 mg/dL   POCT glucose    Collection Time: 03/20/20 10:28 PM   Result Value Ref Range    POCT Glucose 208 (H) 70 - 110 mg/dL   Basic Metabolic Panel (BMP)    Collection Time: 03/21/20  3:21 AM   Result Value Ref Range    Sodium 134 (L) 136 - 145 mmol/L    Potassium 4.2 3.5 - 5.1 mmol/L    Chloride 103 95 - 110 mmol/L    CO2 20 (L) 23 - 29 mmol/L    Glucose 175 (H) 70 - 110 mg/dL    BUN, Bld 12 8 - 23 mg/dL    Creatinine 0.7 0.5 - 1.4 mg/dL    Calcium 8.9 8.7 - 10.5 mg/dL    Anion Gap 11 8 - 16 mmol/L    eGFR if African American >60 >60 mL/min/1.73 m^2     eGFR if non African American >60 >60 mL/min/1.73 m^2   CBC with Automated Differential    Collection Time: 03/21/20  3:21 AM   Result Value Ref Range    WBC 8.24 3.90 - 12.70 K/uL    RBC 4.88 4.00 - 5.40 M/uL    Hemoglobin 12.7 12.0 - 16.0 g/dL    Hematocrit 40.0 37.0 - 48.5 %    Mean Corpuscular Volume 82 82 - 98 fL    Mean Corpuscular Hemoglobin 26.0 (L) 27.0 - 31.0 pg    Mean Corpuscular Hemoglobin Conc 31.8 (L) 32.0 - 36.0 g/dL    RDW 13.0 11.5 - 14.5 %    Platelets 149 (L) 150 - 350 K/uL    MPV 12.1 9.2 - 12.9 fL    Immature Granulocytes 0.2 0.0 - 0.5 %    Gran # (ANC) 4.7 1.8 - 7.7 K/uL    Immature Grans (Abs) 0.02 0.00 - 0.04 K/uL    Lymph # 2.5 1.0 - 4.8 K/uL    Mono # 0.8 0.3 - 1.0 K/uL    Eos # 0.1 0.0 - 0.5 K/uL    Baso # 0.02 0.00 - 0.20 K/uL    nRBC 0 0 /100 WBC    Gran% 57.2 38.0 - 73.0 %    Lymph% 30.5 18.0 - 48.0 %    Mono% 10.2 4.0 - 15.0 %    Eosinophil% 1.7 0.0 - 8.0 %    Basophil% 0.2 0.0 - 1.9 %    Differential Method Automated    Basic metabolic panel    Collection Time: 03/21/20  3:21 AM   Result Value Ref Range    Sodium 134 (L) 136 - 145 mmol/L    Potassium 4.2 3.5 - 5.1 mmol/L    Chloride 103 95 - 110 mmol/L    CO2 20 (L) 23 - 29 mmol/L    Glucose 175 (H) 70 - 110 mg/dL    BUN, Bld 12 8 - 23 mg/dL    Creatinine 0.7 0.5 - 1.4 mg/dL    Calcium 8.9 8.7 - 10.5 mg/dL    Anion Gap 11 8 - 16 mmol/L    eGFR if African American >60 >60 mL/min/1.73 m^2    eGFR if non African American >60 >60 mL/min/1.73 m^2   CBC auto differential    Collection Time: 03/21/20  3:21 AM   Result Value Ref Range    WBC 8.24 3.90 - 12.70 K/uL    RBC 4.88 4.00 - 5.40 M/uL    Hemoglobin 12.7 12.0 - 16.0 g/dL    Hematocrit 40.0 37.0 - 48.5 %    Mean Corpuscular Volume 82 82 - 98 fL    Mean Corpuscular Hemoglobin 26.0 (L) 27.0 - 31.0 pg    Mean Corpuscular Hemoglobin Conc 31.8 (L) 32.0 - 36.0 g/dL    RDW 13.0 11.5 - 14.5 %    Platelets 149 (L) 150 - 350 K/uL    MPV 12.1 9.2 - 12.9 fL    Immature Granulocytes 0.2 0.0 - 0.5  %    Gran # (ANC) 4.7 1.8 - 7.7 K/uL    Immature Grans (Abs) 0.02 0.00 - 0.04 K/uL    Lymph # 2.5 1.0 - 4.8 K/uL    Mono # 0.8 0.3 - 1.0 K/uL    Eos # 0.1 0.0 - 0.5 K/uL    Baso # 0.02 0.00 - 0.20 K/uL    nRBC 0 0 /100 WBC    Gran% 57.2 38.0 - 73.0 %    Lymph% 30.5 18.0 - 48.0 %    Mono% 10.2 4.0 - 15.0 %    Eosinophil% 1.7 0.0 - 8.0 %    Basophil% 0.2 0.0 - 1.9 %    Differential Method Automated    POCT glucose    Collection Time: 03/21/20  9:20 AM   Result Value Ref Range    POCT Glucose 216 (H) 70 - 110 mg/dL     Current Imaging Results:    X-Ray Chest PA And Lateral   Final Result      No acute intrathoracic abnormality.         Electronically signed by: Mili Davis   Date:    03/19/2020   Time:    00:27          Assessment and Plan:     Problem List:    Active Diagnoses:    Diagnosis Date Noted POA    PRINCIPAL PROBLEM:  Non-ST elevated myocardial infarction (non-STEMI) [I21.4] 03/19/2020 Yes    Coronary artery disease [I25.10] 03/20/2020 Yes    History of coronary artery stent placement [Z95.5] 03/20/2020 Not Applicable    Diabetes mellitus, type 2 [E11.9] 03/19/2020 Yes    Overweight [E66.3] 03/20/2020 Yes      Problems Resolved During this Admission:     Assessment and Plan:     1. Coronary Artery Disease              3/11/2020: Began experience occasional pain in jaw.              3/18/2020: Jaw and chest pain for 60 minutes. NSTEMI. Troponin 2.2.              3/19/2020: Echo: Normal left ventricular size and overall systolic function. Basal inferior hypokinesia. EF 60%. Moderate diastolic dysfunction. Mildly dilated LA.   3/20/2020: [facility]: Cath: LM: Distal 30%. LAD: Osteal 80%. Mid 70%. LCX: Mid 80%. RCA: Prox 70%. Mid 80%. RCA: VIJAYA 2.5 x 15 mm & 2.5 x 15 mm. LCX: VIJAYA 2.25 x 15 mm. LAD: VIJAYA 2.5 x 12 mm. 2.25 x 18 mm.    3/21/2020: Reviewed findings and showed angiogram for patient and discussed implications in detail.   3/19/2020: Chol 171. HDL 58. . TG 59.   On atorvastatin 80 mg Q24.   On  aspirin 81 mg Q24.   On clopidogrel 75 mg Q24 for 12 months to 4/1/2021.   Add benazepril 5 mg Q24.   NTG 0.4 mg sl PRN.                  2. Diabetes Mellitus, Type 2              2006: Diagnosed. Complications: CAD. Medications: Oral agent.   3/19/2020: HgbA1C 13.2%.   Needs excellent control to reduce future risk for more cardiovascular events.     3. Overweight              3/19/2020: Weight 70 kg. BMI 27.     4. Primary Care              In transition.    5. Disposition   Discharge home okay with me.   Follow up 2-3 weeks.    VTE Risk Mitigation (From admission, onward)         Ordered     IP VTE LOW RISK PATIENT  Once      03/19/20 0255     Place sequential compression device  Until discontinued      03/19/20 0255                Robyn Little MD  Cardiology  [facility]

## 2020-03-21 NOTE — ASSESSMENT & PLAN NOTE
-A1c 13.7  -Takes metformin at home  -Will certainly require insulin at discharge  -Consult diabetes educator  -Increase detemir today  -Continue sliding scale insulin ac/hs.  -Ask that nurse begin teaching insulin administration and glucose monitoring

## 2020-03-21 NOTE — ASSESSMENT & PLAN NOTE
-Ms. Jamee Purvis was admitted to inpatient status  -She was noted to have inferior st segment depression and positive troponins  -Cardiology was consulted and took her for angiogram on 3/20 which revealed triple vessel disease.  She received 1 stent to LCx, 2 stents to RCA and 2 stents to LAD.  -Echo showed EF 60%, grade II diastolic dysfunction and basilar hypokinesia  -Continue to treat with aspirin, plavix and statin.  -Avoiding beta-blocker inferior mi  -Discussed with Dr. Little and from a cardiac standpoint she is medically stable for discharge.

## 2020-03-21 NOTE — OR NURSING
Patient ambulated to bathroom with RN at side. Voided. Sat on side of bed and ate dinner. Right groin site soft, no bleeding, no hematoma. Will continue to monitor and assess. Contacted Dr. Little because patient under impression she could go to Med Surg room after her 6 hrs supine was complete. Dr. Little wants her to be overnight ICU. Pt informed and bed obtained so she could move off stretcher.Called for her belongings from [address] [place] [address]. Charge Nurse Helen will send with supervisor on rounds.

## 2020-03-21 NOTE — NURSING
Pt transferred via wheelchair with personal belongings to inpatient room 367. Ambulated to bed without difficulty and VSS, hand off @ bedside to MARY Bowling

## 2020-03-21 NOTE — SUBJECTIVE & OBJECTIVE
Interval History: No acute events overnight.  Denies pain and sob.  All questions answered.    Review of Systems   Constitutional: Negative for activity change, chills, diaphoresis and fatigue.   HENT: Negative for congestion, drooling and hearing loss.    Eyes: Negative for discharge.   Respiratory: Negative for apnea, chest tightness, shortness of breath and wheezing.    Cardiovascular: Negative for chest pain, palpitations and leg swelling.   Gastrointestinal: Negative for abdominal distention, abdominal pain, constipation and diarrhea.   Musculoskeletal: Negative for arthralgias and gait problem.   Skin: Negative for rash.   Neurological: Negative for seizures, light-headedness and numbness.   Hematological: Negative for adenopathy.   Psychiatric/Behavioral: Negative for agitation and behavioral problems.     Objective:     Vital Signs (Most Recent):  Temp: 98.4 °F (36.9 °C) (03/21/20 1250)  Pulse: 64 (03/21/20 1250)  Resp: 18 (03/21/20 1250)  BP: 109/70 (03/21/20 1250)  SpO2: 99 % (03/21/20 1250) Vital Signs (24h Range):  Temp:  [98.1 °F (36.7 °C)-99.6 °F (37.6 °C)] 98.4 °F (36.9 °C)  Pulse:  [56-87] 64  Resp:  [16-18] 18  SpO2:  [96 %-100 %] 99 %  BP: (104-136)/(56-79) 109/70     Weight: 70.2 kg (154 lb 13.2 oz)  Body mass index is 26.58 kg/m².    Intake/Output Summary (Last 24 hours) at 3/21/2020 1330  Last data filed at 3/21/2020 1000  Gross per 24 hour   Intake 1300 ml   Output 600 ml   Net 700 ml      Physical Exam   Constitutional: She is oriented to person, place, and time. She appears well-developed and well-nourished. No distress.   HENT:   Head: Normocephalic and atraumatic.   Eyes: Pupils are equal, round, and reactive to light. Conjunctivae and EOM are normal. No scleral icterus.   Neck: Normal range of motion. Neck supple. No tracheal deviation present.   Cardiovascular: Normal rate, regular rhythm, normal heart sounds and intact distal pulses. Exam reveals no gallop and no friction rub.   No  murmur heard.  Pulmonary/Chest: Effort normal and breath sounds normal. No stridor. No respiratory distress. She has no wheezes. She has no rales.   Abdominal: Soft. Bowel sounds are normal. She exhibits no distension. There is no tenderness. There is no guarding.   Musculoskeletal: Normal range of motion. She exhibits no deformity.   Neurological: She is alert and oriented to person, place, and time. No cranial nerve deficit. She exhibits normal muscle tone.   Skin: Skin is warm and dry. She is not diaphoretic.   Psychiatric: She has a normal mood and affect. Her behavior is normal. Judgment and thought content normal.   Nursing note and vitals reviewed.      Significant Labs: All pertinent labs within the past 24 hours have been reviewed.    Significant Imaging: I have reviewed and interpreted all pertinent imaging results/findings within the past 24 hours.

## 2020-03-21 NOTE — OR NURSING
Report received from Clint Dobson RN.  Patient AAO x 4.  Breath sounds clear, no SOB. Denies chest pain.NSR on monitor, rate 75.  VSS. Complains of back pain from lying flat and supine all day. Hydrocodone administered as ordered. Right groin site soft to palpation, no bleeding or hematoma. Will continue to monitor site. Simone DP pulses 2+. Blood sugar 213 and treated with 2 units Aspart to right abd.

## 2020-03-21 NOTE — NURSING
Patient ambulated to bathroom. Sink bath and brushed teeth. Said she slept well last night. VSS. Right groin site soft, no drainage.

## 2020-03-22 LAB
ANION GAP SERPL CALC-SCNC: 12 MMOL/L (ref 8–16)
BASOPHILS # BLD AUTO: 0.02 K/UL (ref 0–0.2)
BASOPHILS NFR BLD: 0.2 % (ref 0–1.9)
BUN SERPL-MCNC: 13 MG/DL (ref 8–23)
CALCIUM SERPL-MCNC: 9.6 MG/DL (ref 8.7–10.5)
CHLORIDE SERPL-SCNC: 102 MMOL/L (ref 95–110)
CO2 SERPL-SCNC: 23 MMOL/L (ref 23–29)
CREAT SERPL-MCNC: 0.7 MG/DL (ref 0.5–1.4)
DIFFERENTIAL METHOD: ABNORMAL
EOSINOPHIL # BLD AUTO: 0.1 K/UL (ref 0–0.5)
EOSINOPHIL NFR BLD: 1.4 % (ref 0–8)
ERYTHROCYTE [DISTWIDTH] IN BLOOD BY AUTOMATED COUNT: 12.9 % (ref 11.5–14.5)
EST. GFR  (AFRICAN AMERICAN): >60 ML/MIN/1.73 M^2
EST. GFR  (NON AFRICAN AMERICAN): >60 ML/MIN/1.73 M^2
GLUCOSE SERPL-MCNC: 188 MG/DL (ref 70–110)
HCT VFR BLD AUTO: 43.2 % (ref 37–48.5)
HGB BLD-MCNC: 13.7 G/DL (ref 12–16)
IMM GRANULOCYTES # BLD AUTO: 0.02 K/UL (ref 0–0.04)
IMM GRANULOCYTES NFR BLD AUTO: 0.2 % (ref 0–0.5)
LYMPHOCYTES # BLD AUTO: 1.9 K/UL (ref 1–4.8)
LYMPHOCYTES NFR BLD: 22.5 % (ref 18–48)
MCH RBC QN AUTO: 26.1 PG (ref 27–31)
MCHC RBC AUTO-ENTMCNC: 31.7 G/DL (ref 32–36)
MCV RBC AUTO: 82 FL (ref 82–98)
MONOCYTES # BLD AUTO: 0.8 K/UL (ref 0.3–1)
MONOCYTES NFR BLD: 9.6 % (ref 4–15)
NEUTROPHILS # BLD AUTO: 5.7 K/UL (ref 1.8–7.7)
NEUTROPHILS NFR BLD: 66.1 % (ref 38–73)
NRBC BLD-RTO: 0 /100 WBC
PLATELET # BLD AUTO: 161 K/UL (ref 150–350)
PMV BLD AUTO: 12.3 FL (ref 9.2–12.9)
POCT GLUCOSE: 156 MG/DL (ref 70–110)
POCT GLUCOSE: 175 MG/DL (ref 70–110)
POCT GLUCOSE: 175 MG/DL (ref 70–110)
POCT GLUCOSE: 176 MG/DL (ref 70–110)
POTASSIUM SERPL-SCNC: 3.8 MMOL/L (ref 3.5–5.1)
RBC # BLD AUTO: 5.24 M/UL (ref 4–5.4)
SODIUM SERPL-SCNC: 137 MMOL/L (ref 136–145)
WBC # BLD AUTO: 8.58 K/UL (ref 3.9–12.7)

## 2020-03-22 PROCEDURE — 80048 BASIC METABOLIC PNL TOTAL CA: CPT

## 2020-03-22 PROCEDURE — 99232 SBSQ HOSP IP/OBS MODERATE 35: CPT | Mod: ,,, | Performed by: HOSPITALIST

## 2020-03-22 PROCEDURE — 25000003 PHARM REV CODE 250: Performed by: HOSPITALIST

## 2020-03-22 PROCEDURE — 36415 COLL VENOUS BLD VENIPUNCTURE: CPT

## 2020-03-22 PROCEDURE — 85025 COMPLETE CBC W/AUTO DIFF WBC: CPT

## 2020-03-22 PROCEDURE — 99232 PR SUBSEQUENT HOSPITAL CARE,LEVL II: ICD-10-PCS | Mod: ,,, | Performed by: HOSPITALIST

## 2020-03-22 PROCEDURE — 25000003 PHARM REV CODE 250: Performed by: INTERNAL MEDICINE

## 2020-03-22 PROCEDURE — 97162 PT EVAL MOD COMPLEX 30 MIN: CPT

## 2020-03-22 PROCEDURE — 11000001 HC ACUTE MED/SURG PRIVATE ROOM

## 2020-03-22 PROCEDURE — 97530 THERAPEUTIC ACTIVITIES: CPT

## 2020-03-22 RX ORDER — DEXTROSE 4 G
TABLET,CHEWABLE ORAL
Qty: 1 EACH | Refills: 0 | Status: SHIPPED | OUTPATIENT
Start: 2020-03-22

## 2020-03-22 RX ORDER — ASPIRIN 81 MG/1
81 TABLET ORAL DAILY
Qty: 30 TABLET | Refills: 1 | Status: SHIPPED | OUTPATIENT
Start: 2020-03-23 | End: 2020-04-24 | Stop reason: SDUPTHER

## 2020-03-22 RX ORDER — NITROGLYCERIN 0.4 MG/1
0.4 TABLET SUBLINGUAL EVERY 5 MIN PRN
Qty: 25 TABLET | Refills: 1 | Status: SHIPPED | OUTPATIENT
Start: 2020-03-22 | End: 2020-04-24 | Stop reason: SDUPTHER

## 2020-03-22 RX ORDER — CLOPIDOGREL BISULFATE 75 MG/1
75 TABLET ORAL DAILY
Qty: 30 TABLET | Refills: 1 | Status: SHIPPED | OUTPATIENT
Start: 2020-03-23 | End: 2020-04-24 | Stop reason: SDUPTHER

## 2020-03-22 RX ORDER — ATORVASTATIN CALCIUM 40 MG/1
40 TABLET, FILM COATED ORAL DAILY
Qty: 30 TABLET | Refills: 1 | Status: SHIPPED | OUTPATIENT
Start: 2020-03-23 | End: 2020-04-24 | Stop reason: SDUPTHER

## 2020-03-22 RX ORDER — BENAZEPRIL HYDROCHLORIDE 5 MG/1
5 TABLET ORAL DAILY
Qty: 30 TABLET | Refills: 1 | Status: SHIPPED | OUTPATIENT
Start: 2020-03-23 | End: 2020-03-23 | Stop reason: HOSPADM

## 2020-03-22 RX ADMIN — ACETAMINOPHEN 650 MG: 325 TABLET ORAL at 08:03

## 2020-03-22 RX ADMIN — ASPIRIN 81 MG: 81 TABLET, COATED ORAL at 08:03

## 2020-03-22 RX ADMIN — CLOPIDOGREL BISULFATE 75 MG: 75 TABLET, FILM COATED ORAL at 08:03

## 2020-03-22 RX ADMIN — BENAZEPRIL HYDROCHLORIDE 5 MG: 5 TABLET ORAL at 09:03

## 2020-03-22 RX ADMIN — ATORVASTATIN CALCIUM 80 MG: 20 TABLET, FILM COATED ORAL at 08:03

## 2020-03-22 RX ADMIN — ACETAMINOPHEN 650 MG: 325 TABLET ORAL at 03:03

## 2020-03-22 RX ADMIN — INSULIN DETEMIR 15 UNITS: 100 INJECTION, SOLUTION SUBCUTANEOUS at 08:03

## 2020-03-22 NOTE — SUBJECTIVE & OBJECTIVE
Interval History: No acute events overnight.  Denies pain and sob.  All questions answered.    Review of Systems   Constitutional: Negative for activity change, chills, diaphoresis and fatigue.   HENT: Negative for congestion, drooling and hearing loss.    Eyes: Negative for discharge.   Respiratory: Negative for apnea, chest tightness, shortness of breath and wheezing.    Cardiovascular: Negative for chest pain, palpitations and leg swelling.   Gastrointestinal: Negative for abdominal distention, abdominal pain, constipation and diarrhea.   Musculoskeletal: Negative for arthralgias and gait problem.   Skin: Negative for rash.   Neurological: Negative for seizures, light-headedness and numbness.   Hematological: Negative for adenopathy.   Psychiatric/Behavioral: Negative for agitation and behavioral problems.     Objective:     Vital Signs (Most Recent):  Temp: 98.3 °F (36.8 °C) (03/22/20 1200)  Pulse: 82 (03/22/20 1200)  Resp: 16 (03/22/20 1200)  BP: 128/82 (03/22/20 1200)  SpO2: 99 % (03/22/20 0749) Vital Signs (24h Range):  Temp:  [97.1 °F (36.2 °C)-98.3 °F (36.8 °C)] 98.3 °F (36.8 °C)  Pulse:  [60-85] 82  Resp:  [16-20] 16  SpO2:  [98 %-99 %] 99 %  BP: (109-138)/(63-82) 128/82     Weight: 70.2 kg (154 lb 13.2 oz)  Body mass index is 26.58 kg/m².  No intake or output data in the 24 hours ending 03/22/20 1444   Physical Exam   Constitutional: She is oriented to person, place, and time. She appears well-developed and well-nourished. No distress.   HENT:   Head: Normocephalic and atraumatic.   Eyes: Pupils are equal, round, and reactive to light. Conjunctivae and EOM are normal. No scleral icterus.   Neck: Normal range of motion. Neck supple. No tracheal deviation present.   Cardiovascular: Normal rate, regular rhythm, normal heart sounds and intact distal pulses. Exam reveals no gallop and no friction rub.   No murmur heard.  Pulmonary/Chest: Effort normal and breath sounds normal. No stridor. No respiratory  distress. She has no wheezes. She has no rales.   Abdominal: Soft. Bowel sounds are normal. She exhibits no distension. There is no tenderness. There is no guarding.   Musculoskeletal: Normal range of motion. She exhibits no deformity.   Neurological: She is alert and oriented to person, place, and time. No cranial nerve deficit. She exhibits normal muscle tone.   Skin: Skin is warm and dry. She is not diaphoretic.   Psychiatric: She has a normal mood and affect. Her behavior is normal. Judgment and thought content normal.   Nursing note and vitals reviewed.      Significant Labs: All pertinent labs within the past 24 hours have been reviewed.    Significant Imaging: I have reviewed and interpreted all pertinent imaging results/findings within the past 24 hours.

## 2020-03-22 NOTE — PT/OT/SLP EVAL
"Physical Therapy Evaluation and Treatment    Patient Name:  Jamee Purvis   MRN:  [id number]    Recommendations:     Discharge Recommendations:  outpatient PT   Discharge Equipment Recommendations: none   Barriers to discharge: Inaccessible home    Assessment:     Jamee Purvis is a 64 y.o. female admitted with a medical diagnosis of Non-ST elevated myocardial infarction (non-STEMI).  She presents with the following impairments/functional limitations:  weakness, impaired self care skills, impaired functional mobilty, gait instability, decreased upper extremity function, pain, decreased ROM. Imaging of shoulder 3/22 @1324: "No acute fracture or dislocation.  No significant AC joint arthropathy.  No significant degenerative change at the glenohumeral joint.  Faint calcification seen in the expected location of the rotator cuff insertion suggestive of minimal calcium hydroxyapatite deposition within the cuff."    Patient evaluated by PT and goals established. Patient eager for ambulation but concerned with increased R shoulder pain, with sharp shooting pain mostly in triceps when performing shoulder flexion/extension/abduction. Pt reports slight relief in pain with UE fully abducted (possible (+) painful arc) but unable to tolerate AROM for special tests of shoulder to differentiate rotator cuff vs other pathologies. Amb x200 ft with no AD and CGA, slow gait speed with no arm swing of RUE. PT will continue to follow and progress as tolerated. Rec for d/c to home with OP PT to address R shoulder pain when clinics re-open.     Rehab Prognosis: Good; patient would benefit from acute skilled PT services to address these deficits and reach maximum level of function.    Recent Surgery: Procedure(s):  Stent, Drug Eluting, Multi Vessel, Coronary 2 Days Post-Op    Plan:     During this hospitalization, patient to be seen 5 x/week to address the identified rehab impairments via gait training, therapeutic activities, " therapeutic exercises, neuromuscular re-education and progress toward the following goals:    · Plan of Care Expires:  04/05/20    Subjective     Chief Complaint: Pain in R arm  Patient/Family Comments/goals: To walk and to have less arm pain  Pain/Comfort:  · Pain Rating 1: (Pain in shoulder/upper arm)  · Pain Rating Post-Intervention 1: (Worsened with movement of shoulder, relieved by rest and off loading of shoulder)    Patients cultural, spiritual, Yarsanism conflicts given the current situation: no    Living Environment:  · Pt lives with her brother in a single story home with no steps to enter and will discharge to her daughter's home that is a single story home with no steps from porch to enter.   · Upon discharge, patient will have assistance from her daugther.  Prior level of function:  · Ambulation: Indep  · ADL's: Indep  · IADLs: Indep  · Falls: Denies  · Equipment used at home: none.  DME owned (not currently used): none.     Objective:     Communicated with MARY Garcia prior to session.  Patient found HOB elevated with telemetry, SCD, peripheral IV  upon PT entry to room.    General Precautions: Standard,     Orthopedic Precautions:N/A   Braces: N/A     Patient donned yellow socks and gait belt for OOB mobility.    Exams:  · Cognition:   · Patient is oriented x4.  · Pt follows approximately 100% of one and two step commands.    · Mood: Pleasant and cooperative.   · Safety Awareness: Fair  · Musculoskeletal:  · Posture:    · Forward head  · Depressed shoulders - TTP supraspinatus, relief of symptoms with offloading of shoulder joint with manual elevation under axilla  · LE ROM/Strength:   · R ROM: WNL  · L ROM: WNL  · R Strength:   · Hip flexion: 5/5  · Knee extension: 5/5  · Dorsiflexion: 5/5   · L Strength:   · Hip flexion: 5/5  · Knee extension: 5/5  · Dorsiflexion: 5/5   · UE ROM/Strength:  · R ROM:  · Impaired shoulder AROM due to pain, able to perform PROM WFL  · When arm overhead, pt reports  decrease in pain (possible (+) painful arc indicating supraspinatus involvement)  · Due to intolerance of shoulder AROM, unable to perform other special tests to identify possible sources of pain  · Elbow: WFL  · Wrist: WFL  · L ROM: WNL  · R Strength:   · Shoulder: Grossly 2/5 in all directions, able to move part way through motion but painful  · Elbow: At least 3+/5, pain with mod-max resistance  · L Strength: WFL  · Neuromuscular:  · Sensation: Intact to light touch bilateral LEs. Denies paresthesias.  · Denies paresthesias to hands/arms, pain reported to radiate down upper arm but not to hand  · Tone/Reflexes: No impairments identified with functional mobility. No formal testing performed.   · Noted tremulous elbow flexion initially, improved with stabilization of proximal joint  · Coordination:  · Finger to thumb: Intact (B)  · Balance:   · Static sitting: Normal  · Static standing: Good  · Dynamic standing: Fair, decreased stride length (B) and CGA  · Visual-vestibular: No impairments identified with functional mobility. No formal testing performed.  · Integument: Visible skin intact  · Cardiopulmonary:  · Edema: None BLE      Functional Mobility:  · Bed Mobility:     · Supine to Sit: contact guard assistance, slow performance with HOB elevated  · Transfers:     · Sit to Stand:  stand by assistance with no AD  · Gait: x200 ft with CGA and no AD.  · Minimal arm swing (B) with R UE more limited than L, slow gait speed with decreased stride length (B)  · No overt LOB with gait      Therapeutic Activities and Exercises:  ·  PT educated patient re:   · PT plan of care/role of PT  · Safety with OOB mobility  · Anatomy/physiology  · Use of pillows to support UE in neutral position for decreased stress to shoulder joint/decreased pain  · Discharge disposition    · Pt verbalized understanding       AM-PAC 6 CLICK MOBILITY  Total Score:19     Patient left up in chair with all lines intact, call button in reach and RN  Radha notified.    GOALS:   Multidisciplinary Problems     Physical Therapy Goals        Problem: Physical Therapy Goal    Goal Priority Disciplines Outcome Goal Variances Interventions   Physical Therapy Goal     PT, PT/OT Ongoing, Progressing     Description:  Goals to be met by: 20    Patient will increase functional independence with mobility by performin. Supine<>sit without use of hospital bed features with supervision.  2. Gait x 300 feet with supervision with no AD.  3. (I) position shoulder in non painful position with pillow support when supine or sitting.                     History:     Past Medical History:   Diagnosis Date    Diabetes mellitus     Overweight 3/20/2020     3/19/2020: Weight 70 kg. BMI 27.         Past Surgical History:   Procedure Laterality Date    HYSTERECTOMY         Time Tracking:     PT Received On: 20  PT Start Time: 1409     PT Stop Time: 1443  PT Total Time (min): 34 min     Billable Minutes: Evaluation 25 and Therapeutic Activity 9      Marysol Trevizo, PT  2020

## 2020-03-22 NOTE — ASSESSMENT & PLAN NOTE
-A1c 13.7  -Takes metformin at home  -Continue detemir today  -Continue sliding scale insulin ac/hs.  -Ask that nurse begin teaching insulin administration and glucose monitoring  -Consult diabetes educator  -Will certainly require insulin at discharge and I am not confident she will be able to afford it.  I have sent prescriptions to our pharmacy so we can get pricing tomorrow to determine if assistance will be required.

## 2020-03-22 NOTE — PROGRESS NOTES
"[facility]  Hospital Medicine  Progress Note    Patient Name: Jamee Purvis  MRN: [id number]  Patient Class: IP- Inpatient   Admission Date: 3/18/2020  Length of Stay: 3 days  Attending Physician: Mio Montejo MD  Primary Care Provider: [facility]        Subjective:     Principal Problem:Non-ST elevated myocardial infarction (non-STEMI)        HPI:  Ms. Jamee Purvis is a 64 y.o. female, with PMH of NIDDM-2, who presented to [facility] ED on 3/18/20 with intermittent chest pain x 1 week. She states the chest pain begins in the b/l sides of her jaw, and travels downward as a "tightness" to the mid chest. She states the pain usually lasts about 1/2 an hour. Tonight the pain began 2.5 hours PTA. She states the pain occurs both with exertion and during rest, resoving on it's own. She denies diaphoresis, SOB, N/V. She was evaluated in the ED, and EKG showed St changes in inferior leads. And elevated troponin of 0.684. She was admitted to inpatient status.     Overview/Hospital Course:  No notes on file    Interval History: No acute events overnight.  Denies pain and sob.  All questions answered.    Review of Systems   Constitutional: Negative for activity change, chills, diaphoresis and fatigue.   HENT: Negative for congestion, drooling and hearing loss.    Eyes: Negative for discharge.   Respiratory: Negative for apnea, chest tightness, shortness of breath and wheezing.    Cardiovascular: Negative for chest pain, palpitations and leg swelling.   Gastrointestinal: Negative for abdominal distention, abdominal pain, constipation and diarrhea.   Musculoskeletal: Negative for arthralgias and gait problem.   Skin: Negative for rash.   Neurological: Negative for seizures, light-headedness and numbness.   Hematological: Negative for adenopathy.   Psychiatric/Behavioral: Negative for agitation and behavioral problems.     Objective:     Vital Signs (Most Recent):  Temp: 98.3 °F " (36.8 °C) (03/22/20 1200)  Pulse: 82 (03/22/20 1200)  Resp: 16 (03/22/20 1200)  BP: 128/82 (03/22/20 1200)  SpO2: 99 % (03/22/20 0749) Vital Signs (24h Range):  Temp:  [97.1 °F (36.2 °C)-98.3 °F (36.8 °C)] 98.3 °F (36.8 °C)  Pulse:  [60-85] 82  Resp:  [16-20] 16  SpO2:  [98 %-99 %] 99 %  BP: (109-138)/(63-82) 128/82     Weight: 70.2 kg (154 lb 13.2 oz)  Body mass index is 26.58 kg/m².  No intake or output data in the 24 hours ending 03/22/20 1444   Physical Exam   Constitutional: She is oriented to person, place, and time. She appears well-developed and well-nourished. No distress.   HENT:   Head: Normocephalic and atraumatic.   Eyes: Pupils are equal, round, and reactive to light. Conjunctivae and EOM are normal. No scleral icterus.   Neck: Normal range of motion. Neck supple. No tracheal deviation present.   Cardiovascular: Normal rate, regular rhythm, normal heart sounds and intact distal pulses. Exam reveals no gallop and no friction rub.   No murmur heard.  Pulmonary/Chest: Effort normal and breath sounds normal. No stridor. No respiratory distress. She has no wheezes. She has no rales.   Abdominal: Soft. Bowel sounds are normal. She exhibits no distension. There is no tenderness. There is no guarding.   Musculoskeletal: Normal range of motion. She exhibits no deformity.   Neurological: She is alert and oriented to person, place, and time. No cranial nerve deficit. She exhibits normal muscle tone.   Skin: Skin is warm and dry. She is not diaphoretic.   Psychiatric: She has a normal mood and affect. Her behavior is normal. Judgment and thought content normal.   Nursing note and vitals reviewed.      Significant Labs: All pertinent labs within the past 24 hours have been reviewed.    Significant Imaging: I have reviewed and interpreted all pertinent imaging results/findings within the past 24 hours.      Assessment/Plan:      * Non-ST elevated myocardial infarction (non-STEMI)  -Ms. Jamee Purvis was admitted  to inpatient status  -She was noted to have inferior st segment depression and positive troponins  -Cardiology was consulted and took her for angiogram on 3/20 which revealed triple vessel disease.  She received 1 stent to LCx, 2 stents to RCA and 2 stents to LAD.  -Echo showed EF 60%, grade II diastolic dysfunction and basilar hypokinesia  -Continue to treat with aspirin, plavix and statin.  -Avoiding beta-blocker inferior mi  -Discussed with Dr. Little and from a cardiac standpoint she is medically stable for discharge.    Diabetes mellitus, type 2  -A1c 13.7  -Takes metformin at home  -Continue detemir today  -Continue sliding scale insulin ac/hs.  -Ask that nurse begin teaching insulin administration and glucose monitoring  -Consult diabetes educator  -Will certainly require insulin at discharge and I am not confident she will be able to afford it.  I have sent prescriptions to our pharmacy so we can get pricing tomorrow to determine if assistance will be required.        Coronary artery disease  -Treatment as above.      History of coronary artery stent placement          VTE Risk Mitigation (From admission, onward)         Ordered     IP VTE LOW RISK PATIENT  Once      03/19/20 0255     Place sequential compression device  Until discontinued      03/19/20 0255                      Mio Montejo MD  Department of Hospital Medicine   [facility]

## 2020-03-22 NOTE — NURSING
Patient AAO x 4, no apparent SOB or distress noted.  VSS.  Resp even and unlabored. Patient pleasant and cooperative.  PO medications administered as ordered, tolerated well. All items needed within reach. Purposeful rounding maintained.   Patient remains free from falls, injury or skin breakdown.  Safety measures maintained.  Bed in lowest locked position.  Call bell within reach.  Will continue to monitor.

## 2020-03-22 NOTE — PROGRESS NOTES
[facility]  Cardiology  Progress Note    Patient Name: Jamee Purvis  MRN: [id number]  Admission Date: 3/18/2020  Hospital Length of Stay: 3 days  Code Status: Full Code   Attending Physician: Mio Montejo MD   Primary Care Physician: [facility]  Expected Discharge Date:   Principal Problem:Non-ST elevated myocardial infarction (non-STEMI)    Subjective:     Brief HPI:    Jamee Purvis is a 64 y.o.female with diabetes mellitus, type 2. She is overweight. Beginning about 3/11/2020 she has felt jaw pain on and off with mild SOB. She thought she had a sinus issues and was seen in the ER. He occasional jaw pain persisted. In the evening of 3/18/2020 the jaw pain was followed by moderate chest tightness. She became concerned and presented to the ER. She was admitted.       Hospital Course:    3/20/2020: [facility]: Cath: LM: Distal 30%. LAD: Osteal 80%. Mid 70%. LCX: Mid 80%. RCA: Prox 70%. Mid 80%. RCA: VIJAYA 2.5 x 15 mm & 2.5 x 15 mm. LCX: VIJAYA 2.25 x 15 mm. LAD: VIJAYA 2.5 x 12 mm. 2.25 x 18 mm.     Interval History:    No further CP. No SOB. Sore right upper arm    Review of Systems   Constitution: Negative for chills, fever and malaise/fatigue.   HENT: Negative for congestion and nosebleeds.    Eyes: Negative for vision loss in left eye and vision loss in right eye.   Cardiovascular: Negative for chest pain, leg swelling, orthopnea, palpitations and paroxysmal nocturnal dyspnea.   Respiratory: Negative for cough, hemoptysis, shortness of breath, sputum production and wheezing.    Hematologic/Lymphatic: Negative for bleeding problem.   Skin: Negative for rash.   Musculoskeletal: Negative for myalgias.   Gastrointestinal: Negative for abdominal pain, heartburn, hematemesis, hematochezia, jaundice, melena, nausea and vomiting.   Genitourinary: Negative for hematuria.   Neurological: Negative for dizziness, headaches, light-headedness, vertigo and weakness.    Psychiatric/Behavioral: Negative for altered mental status. The patient is not nervous/anxious.    Allergic/Immunologic: Negative for persistent infections.     Objective:     Vital Signs (Most Recent):  Temp: 98.1 °F (36.7 °C) (03/22/20 0800)  Pulse: 72 (03/22/20 0800)  Resp: 16 (03/22/20 0800)  BP: 118/63 (03/22/20 0800)  SpO2: 99 % (03/22/20 0749) Vital Signs (24h Range):  Temp:  [97.1 °F (36.2 °C)-98.3 °F (36.8 °C)] 98.1 °F (36.7 °C)  Pulse:  [60-85] 72  Resp:  [16-20] 16  SpO2:  [98 %-99 %] 99 %  BP: (109-138)/(63-68) 118/63     Weight: 70.2 kg (154 lb 13.2 oz)  Body mass index is 26.58 kg/m².    SpO2: 99 %  O2 Device (Oxygen Therapy): room air    No intake or output data in the 24 hours ending 03/22/20 1252    Lines/Drains/Airways     Peripheral Intravenous Line                 Peripheral IV - Single Lumen 03/19/20 0125 22 G Left Antecubital 3 days                Physical Exam   Constitutional: She is oriented to person, place, and time. She appears well-developed and well-nourished.  Non-toxic appearance. No distress.   HENT:   Head: Normocephalic and atraumatic.   Nose: Nose normal.   Eyes: Right eye exhibits no discharge. Left eye exhibits no discharge. Right conjunctiva is not injected. Left conjunctiva is not injected. Right pupil is round. Left pupil is round. Pupils are equal.   Neck: Neck supple. No JVD present. Carotid bruit is not present. No thyromegaly present.   Cardiovascular: Normal rate, regular rhythm, S1 normal and S2 normal.  No extrasystoles are present. PMI is not displaced. Exam reveals no gallop.   Pulses:       Radial pulses are 2+ on the right side, and 2+ on the left side.        Femoral pulses are 2+ on the right side, and 2+ on the left side.       Dorsalis pedis pulses are 2+ on the right side, and 2+ on the left side.        Posterior tibial pulses are 2+ on the right side, and 2+ on the left side.   Pulmonary/Chest: Effort normal and breath sounds normal.   Abdominal: Soft.  Normal appearance. There is no hepatosplenomegaly. There is no tenderness.   Musculoskeletal:        Right shoulder: She exhibits no swelling.        Right ankle: She exhibits no swelling, no ecchymosis and no deformity.        Left ankle: She exhibits no swelling, no ecchymosis and no deformity.   Lymphadenopathy:        Head (right side): No submandibular adenopathy present.        Head (left side): No submandibular adenopathy present.     She has no cervical adenopathy.   Neurological: She is alert and oriented to person, place, and time. She is not disoriented. No cranial nerve deficit.   Skin: Skin is warm, dry and intact. No rash noted. She is not diaphoretic. No cyanosis. Nails show no clubbing.   Psychiatric: She has a normal mood and affect. Her speech is normal and behavior is normal. Judgment and thought content normal. Cognition and memory are normal.       Current Medications:     aspirin  81 mg Oral Daily    atorvastatin  80 mg Oral Daily    benazepriL  5 mg Oral Daily    clopidogreL  75 mg Oral Daily    insulin detemir U-100  15 Units Subcutaneous Daily     Current Laboratory Results:    Recent Results (from the past 24 hour(s))   POCT glucose    Collection Time: 03/21/20  3:23 PM   Result Value Ref Range    POCT Glucose 158 (H) 70 - 110 mg/dL   POCT glucose    Collection Time: 03/21/20  4:36 PM   Result Value Ref Range    POCT Glucose 231 (H) 70 - 110 mg/dL   POCT glucose    Collection Time: 03/21/20  9:08 PM   Result Value Ref Range    POCT Glucose 231 (H) 70 - 110 mg/dL   Basic Metabolic Panel (BMP)    Collection Time: 03/22/20  4:06 AM   Result Value Ref Range    Sodium 137 136 - 145 mmol/L    Potassium 3.8 3.5 - 5.1 mmol/L    Chloride 102 95 - 110 mmol/L    CO2 23 23 - 29 mmol/L    Glucose 188 (H) 70 - 110 mg/dL    BUN, Bld 13 8 - 23 mg/dL    Creatinine 0.7 0.5 - 1.4 mg/dL    Calcium 9.6 8.7 - 10.5 mg/dL    Anion Gap 12 8 - 16 mmol/L    eGFR if African American >60 >60 mL/min/1.73 m^2     eGFR if non African American >60 >60 mL/min/1.73 m^2   CBC with Automated Differential    Collection Time: 03/22/20  4:06 AM   Result Value Ref Range    WBC 8.58 3.90 - 12.70 K/uL    RBC 5.24 4.00 - 5.40 M/uL    Hemoglobin 13.7 12.0 - 16.0 g/dL    Hematocrit 43.2 37.0 - 48.5 %    Mean Corpuscular Volume 82 82 - 98 fL    Mean Corpuscular Hemoglobin 26.1 (L) 27.0 - 31.0 pg    Mean Corpuscular Hemoglobin Conc 31.7 (L) 32.0 - 36.0 g/dL    RDW 12.9 11.5 - 14.5 %    Platelets 161 150 - 350 K/uL    MPV 12.3 9.2 - 12.9 fL    Immature Granulocytes 0.2 0.0 - 0.5 %    Gran # (ANC) 5.7 1.8 - 7.7 K/uL    Immature Grans (Abs) 0.02 0.00 - 0.04 K/uL    Lymph # 1.9 1.0 - 4.8 K/uL    Mono # 0.8 0.3 - 1.0 K/uL    Eos # 0.1 0.0 - 0.5 K/uL    Baso # 0.02 0.00 - 0.20 K/uL    nRBC 0 0 /100 WBC    Gran% 66.1 38.0 - 73.0 %    Lymph% 22.5 18.0 - 48.0 %    Mono% 9.6 4.0 - 15.0 %    Eosinophil% 1.4 0.0 - 8.0 %    Basophil% 0.2 0.0 - 1.9 %    Differential Method Automated    POCT glucose    Collection Time: 03/22/20  7:46 AM   Result Value Ref Range    POCT Glucose 176 (H) 70 - 110 mg/dL     Current Imaging Results:    X-Ray Chest PA And Lateral   Final Result      No acute intrathoracic abnormality.         Electronically signed by: Mili Davis   Date:    03/19/2020   Time:    00:27          Assessment and Plan:     Problem List:    Active Diagnoses:    Diagnosis Date Noted POA    PRINCIPAL PROBLEM:  Non-ST elevated myocardial infarction (non-STEMI) [I21.4] 03/19/2020 Yes    Coronary artery disease [I25.10] 03/20/2020 Yes    History of coronary artery stent placement [Z95.5] 03/20/2020 Not Applicable    Diabetes mellitus, type 2 [E11.9] 03/19/2020 Yes    Overweight [E66.3] 03/20/2020 Yes      Problems Resolved During this Admission:     Assessment and Plan:     1. Coronary Artery Disease              3/11/2020: Began experience occasional pain in jaw.              3/18/2020: Jaw and chest pain for 60 minutes. NSTEMI. Troponin 2.2.               3/19/2020: Echo: Normal left ventricular size and overall systolic function. Basal inferior hypokinesia. EF 60%. Moderate diastolic dysfunction. Mildly dilated LA.   3/20/2020: [facility]: Cath: LM: Distal 30%. LAD: Osteal 80%. Mid 70%. LCX: Mid 80%. RCA: Prox 70%. Mid 80%. RCA: VIJAYA 2.5 x 15 mm & 2.5 x 15 mm. LCX: VIJAYA 2.25 x 15 mm. LAD: VIJAYA 2.5 x 12 mm. 2.25 x 18 mm.    3/21/2020: Reviewed findings and showed angiogram for patient and discussed implications in detail.   3/19/2020: Chol 171. HDL 58. . TG 59.   On atorvastatin 80 mg Q24.   On aspirin 81 mg Q24.   On clopidogrel 75 mg Q24 for 12 months to 4/1/2021.   On benazepril 5 mg Q24.   On NTG 0.4 mg sl PRN.                  2. Diabetes Mellitus, Type 2              2006: Diagnosed. Complications: CAD. Medications: On insulin.   3/19/2020: HgbA1C 13.2%.   Needs excellent control to reduce future risk for more cardiovascular events.     3. Overweight              3/19/2020: Weight 70 kg. BMI 27.     4. Primary Care              In transition.    5. Disposition   Discharge home okay with me.   Follow up 2-3 weeks.    VTE Risk Mitigation (From admission, onward)         Ordered     IP VTE LOW RISK PATIENT  Once      03/19/20 0255     Place sequential compression device  Until discontinued      03/19/20 0255                Robyn Little MD  Cardiology  [facility]

## 2020-03-22 NOTE — NURSING
VSS and afebrile, AAOx4. Dressing is CDI, pain controlled by PRN tylenol, tolerated well. Ordered diet tolerated well. Patient still complaining of right arm pain. Patient doing well. Plan of care reviewed with patient. Purposeful rounding done, call light at bed side, bed at lowest position, brakes on, non-skid socks on, will continue to monitor.

## 2020-03-22 NOTE — PLAN OF CARE
Problem: Physical Therapy Goal  Goal: Physical Therapy Goal  Description  Goals to be met by: 20    Patient will increase functional independence with mobility by performin. Supine<>sit without use of hospital bed features with supervision.  2. Gait x 300 feet with supervision with no AD.  3. (I) position shoulder in non painful position with pillow support when supine or sitting.    Outcome: Ongoing, Progressing     Patient evaluated by PT and goals established. Patient eager for ambulation but concerned with increased R shoulder pain, with sharp shooting pain mostly in triceps when performing shoulder flexion/extension/abduction. Pt reports slight relief in pain with UE fully abducted (possible (+) painful arc) but unable to tolerate AROM for special tests of shoulder to differentiate rotator cuff vs other pathologies. Amb x200 ft with no AD and CGA, slow gait speed with no arm swing of RUE. PT will continue to follow and progress as tolerated. Rec for d/c to home with OP PT to address R shoulder pain when clinics re-open. Please see progress note for detailed plan of care and recommendations.

## 2020-03-23 VITALS
HEIGHT: 64 IN | DIASTOLIC BLOOD PRESSURE: 73 MMHG | OXYGEN SATURATION: 100 % | BODY MASS INDEX: 26.43 KG/M2 | TEMPERATURE: 99 F | HEART RATE: 85 BPM | WEIGHT: 154.81 LBS | SYSTOLIC BLOOD PRESSURE: 119 MMHG | RESPIRATION RATE: 18 BRPM

## 2020-03-23 LAB — POCT GLUCOSE: 165 MG/DL (ref 70–110)

## 2020-03-23 PROCEDURE — 97530 THERAPEUTIC ACTIVITIES: CPT

## 2020-03-23 PROCEDURE — 99239 PR HOSPITAL DISCHARGE DAY,>30 MIN: ICD-10-PCS | Mod: ,,, | Performed by: PHYSICIAN ASSISTANT

## 2020-03-23 PROCEDURE — 90471 IMMUNIZATION ADMIN: CPT | Mod: VFC | Performed by: HOSPITALIST

## 2020-03-23 PROCEDURE — 90686 IIV4 VACC NO PRSV 0.5 ML IM: CPT | Mod: SL | Performed by: HOSPITALIST

## 2020-03-23 PROCEDURE — C9399 UNCLASSIFIED DRUGS OR BIOLOG: HCPCS | Performed by: HOSPITALIST

## 2020-03-23 PROCEDURE — 97165 OT EVAL LOW COMPLEX 30 MIN: CPT

## 2020-03-23 PROCEDURE — 63600175 PHARM REV CODE 636 W HCPCS: Performed by: HOSPITALIST

## 2020-03-23 PROCEDURE — 25000003 PHARM REV CODE 250: Performed by: HOSPITALIST

## 2020-03-23 PROCEDURE — 97535 SELF CARE MNGMENT TRAINING: CPT

## 2020-03-23 PROCEDURE — 99239 HOSP IP/OBS DSCHRG MGMT >30: CPT | Mod: ,,, | Performed by: PHYSICIAN ASSISTANT

## 2020-03-23 RX ORDER — INSULIN GLARGINE 100 [IU]/ML
15 INJECTION, SOLUTION SUBCUTANEOUS DAILY
Qty: 15 ML | Refills: 0 | Status: SHIPPED | OUTPATIENT
Start: 2020-03-23

## 2020-03-23 RX ORDER — PEN NEEDLE, DIABETIC 30 GX3/16"
1 NEEDLE, DISPOSABLE MISCELLANEOUS DAILY
Qty: 100 EACH | Refills: 0 | Status: SHIPPED | OUTPATIENT
Start: 2020-03-23

## 2020-03-23 RX ORDER — LISINOPRIL 5 MG/1
5 TABLET ORAL DAILY
Qty: 30 TABLET | Refills: 0 | Status: SHIPPED | OUTPATIENT
Start: 2020-03-23 | End: 2020-04-20 | Stop reason: SDUPTHER

## 2020-03-23 RX ADMIN — CLOPIDOGREL BISULFATE 75 MG: 75 TABLET, FILM COATED ORAL at 10:03

## 2020-03-23 RX ADMIN — INSULIN DETEMIR 15 UNITS: 100 INJECTION, SOLUTION SUBCUTANEOUS at 10:03

## 2020-03-23 RX ADMIN — ATORVASTATIN CALCIUM 80 MG: 20 TABLET, FILM COATED ORAL at 10:03

## 2020-03-23 RX ADMIN — ASPIRIN 81 MG: 81 TABLET, COATED ORAL at 10:03

## 2020-03-23 RX ADMIN — INFLUENZA VIRUS VACCINE 0.5 ML: 15; 15; 15; 15 SUSPENSION INTRAMUSCULAR at 02:03

## 2020-03-23 NOTE — PROGRESS NOTES
[facility]  Cardiology  Progress Note    Patient Name: Jamee Purvis  MRN: [id number]  Admission Date: 3/18/2020  Hospital Length of Stay: 4 days  Code Status: Full Code   Attending Physician: Mio Montejo MD   Primary Care Physician: [facility]  Expected Discharge Date:   Principal Problem:Non-ST elevated myocardial infarction (non-STEMI)    Subjective:     Brief HPI:    Jamee Purvis is a 64 y.o.female with diabetes mellitus, type 2. She is overweight. Beginning about 3/11/2020 she has felt jaw pain on and off with mild SOB. She thought she had a sinus issues and was seen in the ER. He occasional jaw pain persisted. In the evening of 3/18/2020 the jaw pain was followed by moderate chest tightness. She became concerned and presented to the ER. She was admitted.       Hospital Course:    3/20/2020: [facility]: Cath: LM: Distal 30%. LAD: Osteal 80%. Mid 70%. LCX: Mid 80%. RCA: Prox 70%. Mid 80%. RCA: VIJAYA 2.5 x 15 mm & 2.5 x 15 mm. LCX: VIJAYA 2.25 x 15 mm. LAD: VIJAYA 2.5 x 12 mm. 2.25 x 18 mm.     Interval History:    Feeling well. Ambulating in room. Appears she should be able to afford all cardiac medications but insulin may be an issue.    Review of Systems   Constitution: Negative for chills, fever and malaise/fatigue.   HENT: Negative for congestion and nosebleeds.    Eyes: Negative for vision loss in left eye and vision loss in right eye.   Cardiovascular: Negative for chest pain, leg swelling, orthopnea, palpitations and paroxysmal nocturnal dyspnea.   Respiratory: Negative for cough, hemoptysis, shortness of breath, sputum production and wheezing.    Hematologic/Lymphatic: Negative for bleeding problem.   Skin: Negative for rash.   Musculoskeletal: Negative for myalgias.   Gastrointestinal: Negative for abdominal pain, heartburn, hematemesis, hematochezia, jaundice, melena, nausea and vomiting.   Genitourinary: Negative for hematuria.   Neurological: Negative for  dizziness, headaches, light-headedness, vertigo and weakness.   Psychiatric/Behavioral: Negative for altered mental status. The patient is not nervous/anxious.    Allergic/Immunologic: Negative for persistent infections.     Objective:     Vital Signs (Most Recent):  Temp: 98.5 °F (36.9 °C) (03/23/20 0350)  Pulse: 68 (03/23/20 0800)  Resp: 20 (03/23/20 0350)  BP: 117/62 (03/23/20 0350)  SpO2: 98 % (03/23/20 0350) Vital Signs (24h Range):  Temp:  [98.1 °F (36.7 °C)-98.6 °F (37 °C)] 98.5 °F (36.9 °C)  Pulse:  [56-87] 68  Resp:  [16-20] 20  SpO2:  [98 %-100 %] 98 %  BP: (117-142)/(62-82) 117/62     Weight: 70.2 kg (154 lb 13.2 oz)  Body mass index is 26.58 kg/m².    SpO2: 98 %  O2 Device (Oxygen Therapy): room air    No intake or output data in the 24 hours ending 03/23/20 1003    Lines/Drains/Airways     Peripheral Intravenous Line                 Peripheral IV - Single Lumen 03/19/20 0125 22 G Left Antecubital 4 days                Physical Exam   Constitutional: She is oriented to person, place, and time. She appears well-developed and well-nourished.  Non-toxic appearance. No distress.   HENT:   Head: Normocephalic and atraumatic.   Nose: Nose normal.   Eyes: Right eye exhibits no discharge. Left eye exhibits no discharge. Right conjunctiva is not injected. Left conjunctiva is not injected. Right pupil is round. Left pupil is round. Pupils are equal.   Neck: Neck supple. No JVD present. Carotid bruit is not present. No thyromegaly present.   Cardiovascular: Normal rate, regular rhythm, S1 normal and S2 normal.  No extrasystoles are present. PMI is not displaced. Exam reveals no gallop.   Pulses:       Radial pulses are 2+ on the right side, and 2+ on the left side.        Femoral pulses are 2+ on the right side, and 2+ on the left side.       Dorsalis pedis pulses are 2+ on the right side, and 2+ on the left side.        Posterior tibial pulses are 2+ on the right side, and 2+ on the left side.   Pulmonary/Chest:  Effort normal and breath sounds normal.   Abdominal: Soft. Normal appearance. There is no hepatosplenomegaly. There is no tenderness.   Musculoskeletal:        Right shoulder: She exhibits no swelling.        Right ankle: She exhibits no swelling, no ecchymosis and no deformity.        Left ankle: She exhibits no swelling, no ecchymosis and no deformity.   Lymphadenopathy:        Head (right side): No submandibular adenopathy present.        Head (left side): No submandibular adenopathy present.     She has no cervical adenopathy.   Neurological: She is alert and oriented to person, place, and time. She is not disoriented. No cranial nerve deficit.   Skin: Skin is warm, dry and intact. No rash noted. She is not diaphoretic. No cyanosis. Nails show no clubbing.   Psychiatric: She has a normal mood and affect. Her speech is normal and behavior is normal. Judgment and thought content normal. Cognition and memory are normal.       Current Medications:     aspirin  81 mg Oral Daily    atorvastatin  80 mg Oral Daily    benazepriL  5 mg Oral Daily    clopidogreL  75 mg Oral Daily    insulin detemir U-100  15 Units Subcutaneous Daily     Current Laboratory Results:    Recent Results (from the past 24 hour(s))   POCT glucose    Collection Time: 03/22/20 11:55 AM   Result Value Ref Range    POCT Glucose 156 (H) 70 - 110 mg/dL   POCT glucose    Collection Time: 03/22/20  4:24 PM   Result Value Ref Range    POCT Glucose 175 (H) 70 - 110 mg/dL   POCT glucose    Collection Time: 03/22/20  9:52 PM   Result Value Ref Range    POCT Glucose 175 (H) 70 - 110 mg/dL   POCT glucose    Collection Time: 03/23/20  7:57 AM   Result Value Ref Range    POCT Glucose 165 (H) 70 - 110 mg/dL     Current Imaging Results:    X-ray Shoulder 2 or More Views Right   Final Result      1.  As above         Electronically signed by: Jameel Rowe DO   Date:    03/22/2020   Time:    13:24      X-Ray Chest PA And Lateral   Final Result      No acute  intrathoracic abnormality.         Electronically signed by: Mili Davis   Date:    03/19/2020   Time:    00:27          Assessment and Plan:     Problem List:    Active Diagnoses:    Diagnosis Date Noted POA    PRINCIPAL PROBLEM:  Non-ST elevated myocardial infarction (non-STEMI) [I21.4] 03/19/2020 Yes    Coronary artery disease [I25.10] 03/20/2020 Yes    History of coronary artery stent placement [Z95.5] 03/20/2020 Not Applicable    Diabetes mellitus, type 2 [E11.9] 03/19/2020 Yes    Overweight [E66.3] 03/20/2020 Yes      Problems Resolved During this Admission:     Assessment and Plan:     1. Coronary Artery Disease              3/11/2020: Began experience occasional pain in jaw.              3/18/2020: Jaw and chest pain for 60 minutes. NSTEMI. Troponin 2.2.              3/19/2020: Echo: Normal left ventricular size and overall systolic function. Basal inferior hypokinesia. EF 60%. Moderate diastolic dysfunction. Mildly dilated LA.   3/20/2020: [facility]: Cath: LM: Distal 30%. LAD: Osteal 80%. Mid 70%. LCX: Mid 80%. RCA: Prox 70%. Mid 80%. RCA: VIJAYA 2.5 x 15 mm & 2.5 x 15 mm. LCX: VIJAYA 2.25 x 15 mm. LAD: VIJAYA 2.5 x 12 mm. 2.25 x 18 mm.    3/21/2020: Reviewed findings and showed angiogram for patient and discussed implications in detail.   3/19/2020: Chol 171. HDL 58. . TG 59.   On atorvastatin 80 mg Q24.   On aspirin 81 mg Q24.   On clopidogrel 75 mg Q24 for 12 months to 4/1/2021.   On benazepril 5 mg Q24.   On NTG 0.4 mg sl PRN.                  2. Diabetes Mellitus, Type 2              2006: Diagnosed. Complications: CAD. Medications: On insulin.   3/19/2020: HgbA1C 13.2%.   Needs excellent control to reduce future risk for more cardiovascular events.     3. Overweight              3/19/2020: Weight 70 kg. BMI 27.     4. Primary Care              In transition.    5. Disposition   Discharge home okay with me.   Follow up 2-3 weeks.    VTE Risk Mitigation (From admission, onward)         Ordered     IP  VTE LOW RISK PATIENT  Once      03/19/20 0255     Place sequential compression device  Until discontinued      03/19/20 0255                Robyn Little MD  Cardiology  [facility]

## 2020-03-23 NOTE — PROGRESS NOTES
"10:53am - DEEPTI called [facility] [phone] to schedule appt with Cardiologist, spoke to Delaney, will have nurse call pt to schedule, needs to make sure pt is placed with the "right doctor".  DEEPTI left message for nurse to call SW once appt has been scheduled or after call with pt.    11:31am- DEEPTI received call from Shannon, nurse at [facility]; DEEPTI gave information on reason in hospital.  Shannon stated she will call pt and schedule an appt.    3:11pm - DEEPTI f/u with Shannon [phone] and she stated she called pt and left message.    3:15pm - DEEPTI called pt's room and informed her that [facility] called her to schedule an appt. Pt acknowledged receiving a call but didn't answer because MD was in room.  DEEPTI asked pt to call # back and schedule appt.  Pt stated she will call when she get home.  "

## 2020-03-23 NOTE — DISCHARGE INSTRUCTIONS
Taking Blood Thinners After Percutaneous Coronary Intervention (PCI)  Percutaneous coronary intervention (PCI) involves angioplasty and often stenting. After this procedure, there is a chance that a blood clot will form at the blockage site. A blood clot can also form on a stent, if you have one. Your doctor will prescribe medication to help prevent this. Over time, the artery may also become blocked again. By monitoring your symptoms, you help your doctor detect problems before they become too serious.  Preventing blood clots  To help prevent blood clots, you will need to take medication, usually aspirin, daily for an extended period of time. A second medication, such as clopidogrel, may also be needed. Take these medications exactly as directed. Doing so lowers your risk of heart attack and even death. Your heart doctor can tell you how long these medications will be needed. Don't stop taking them without talking to your heart doctor first.   Date Last Reviewed:   © 9331-0554 [employer]. [address] [place] [address], [place] [address]. All rights reserved. This information is not intended as a substitute for professional medical care. Always follow your healthcare professional's instructions.

## 2020-03-23 NOTE — PLAN OF CARE
"Spoke with patient via phone regarding new meds - total $497.24 - patient denies ability to cover any portion of payment - discussed plan moving forward & patient states "i'll start working on my family members to help me because I can't work right now" - encouraged patient to also establish at a clinic that will provide assistance with meds - info given - CM will cover cost of meds - cost transfer form faxed - awaiting bedside delivery   "

## 2020-03-23 NOTE — PLAN OF CARE
Problem: Physical Therapy Goal  Goal: Physical Therapy Goal  Description  Goals to be met by: 20    Patient will increase functional independence with mobility by performin. Supine<>sit without use of hospital bed features with supervision.  2. Gait x 300 feet with supervision with no AD. MET 3/23  3. (I) position shoulder in non painful position with pillow support when supine or sitting.     3/23/2020 1508 by Marysol Trevizo, PT  Outcome: Adequate for Care Transition     Patient continued pain in shoulder with attempted AROM, able to perform limited PROM with minimal pain. Amb x315 ft with no AD and supervision. Rec for d/c to OP PT to continue addressing R shoulder pain.

## 2020-03-23 NOTE — PT/OT/SLP EVAL
Occupational Therapy   Evaluation and Discharge    Name: Jamee Purvis  MRN: [id number]  Admitting Diagnosis:  Non-ST elevated myocardial infarction (non-STEMI) 3 Days Post-Op    Recommendations:     Discharge Recommendations: outpatient OT(Recommend Ortho consult)  Discharge Equipment Recommendations:  none  Barriers to discharge:  None    Assessment:     Jamee Purvis is a 64 y.o. female with a medical diagnosis of Non-ST elevated myocardial infarction (non-STEMI).  She presents sitting up in chair and agreeable to OT evaluation. Performance deficits affecting function: weakness, impaired self care skills, decreased upper extremity function, pain, decreased ROM, impaired functional mobilty.  Recommend discharge to daughter's home with Outpatient OT or PT and Ortho consult.    Rehab Prognosis: Good; patient would benefit from acute skilled OT services to address these deficits and reach maximum level of function.       Plan:     Patient to be seen (Pt discharging to daughter's home today.) to address the above listed problems via self-care/home management  · Plan of Care Expires: 04/22/20  · Plan of Care Reviewed with: patient    Subjective     Chief Complaint: Unable to move right UE and pain in right UE.  Patient/Family Comments/goals: Discharge to daughter's home.     Occupational Profile:  Living Environment: Pt reports she will discharge to her daughter's home where there are no steps to enter  Previous level of function: Indep  Equipment Used at Home:  none  Assistance upon Discharge: Daughter who will be available 24 hrs/day    Pain/Comfort:  · Pain Rating 1: (Pain in right UE/shoulder during movement.)  · Location - Side 1: Right  · Pain Addressed 1: Cessation of Activity, Reposition  · Pain Rating Post-Intervention 1: 0/10(No pain when pt not attempting to use right UE)    Patients cultural, spiritual, Denominational conflicts given the current situation: (None stated)    Objective:     Communicated with:  nurse prior to session.  Patient found up in chair with peripheral IV upon OT entry to room.    General Precautions: Standard, fall   Orthopedic Precautions:N/A   Braces: N/A     Occupational Performance:    Functional Mobility/Transfers:  · Patient completed Sit <> Stand Transfer with stand by assistance  with  no assistive device   · Functional Mobility: Pt without LOB.  Pt educated on safety strategies to reduce fall risk during ADL mobility.     Activities of Daily Living:  · Feeding:  modified independence eating with nondominant hand  · Grooming: SBA using nondominant hand  · Upper Body Dressing: Min A; Pt instructed in adaptive techniques for UB self care tasks  · Lower Body Dressing: Min A; pt instructed to sit for all LB dressing  · Toileting: stand by assistance      Cognitive/Visual Perceptual:  Cognitive/Psychosocial Skills: Grossly intact  Vision: Functional    Physical Exam:  Dominant hand: right hand  Upper Extremity Range of Motion: Left UE is WFL.  Right Hand, Wrist and Elbow are WFL.  Pt only able to raise right UE at shoulder ~10%.  Upper Extremity Strength: Left UE is WFL.  Right Hand and Wrist is WFL.  Right Elbow 3+/5.  Shoulder 2-/5.  Fine Motor Coordination:  Intact  Gross motor coordination: Left UE is intact; Right UE elbow and shoulder are impaired.    AMPAC 6 Click ADL:  AMPAC Total Score: 19    Treatment & Education:  Role of OT, Adaptive techniques for self care, safety strategies to reduce fall risk, and right UE precautions  Education:    Patient left up in chair with all lines intact, call button in reach and nurse notified    GOALS:   Multidisciplinary Problems     Occupational Therapy Goals     Not on file          Multidisciplinary Problems (Resolved)        Problem: Occupational Therapy Goal    Goal Priority Disciplines Outcome Interventions   Occupational Therapy Goal   (Resolved)     OT, PT/OT Met    Description:  Goals to be met by: 3/23/20    Patient will increase functional  independence with ADLs by:    Demonstrating understanding of adaptive techniques for UB self care tasks.   MET  Demonstrating understanding of precautions for right UE.  MET  Demonstrating understanding of safety strategies to reduce fall risk during ADLs.  MET                        History:     Past Medical History:   Diagnosis Date    Diabetes mellitus     Overweight 3/20/2020     3/19/2020: Weight 70 kg. BMI 27.         Past Surgical History:   Procedure Laterality Date    HYSTERECTOMY         Time Tracking:     OT Date of Treatment: 03/23/20  OT Start Time: 1255  OT Stop Time: 1320  OT Total Time (min): 25 min    Billable Minutes:Evaluation 15                    Self care  10    LEXX Elder  3/23/2020

## 2020-03-23 NOTE — DISCHARGE SUMMARY
"[facility]  Hospital Medicine  Discharge Summary      Patient Name: Jamee Purvis  MRN: [id number]  Admission Date: 3/18/2020  Hospital Length of Stay: 5 days  Discharge Date and Time: 3/23/2020  3:29 PM  Attending Physician: Sowmya att. providers found   Discharging Provider: Marcella Wheeler PA-C  Primary Care Provider: [facility]      HPI:   Ms. Jamee Purvis is a 64 y.o. female, with PMH of NIDDM-2, who presented to [facility] ED on 3/18/20 with intermittent chest pain x 1 week. She states the chest pain begins in the b/l sides of her jaw, and travels downward as a "tightness" to the mid chest. She states the pain usually lasts about 1/2 an hour. Tonight the pain began 2.5 hours PTA. She states the pain occurs both with exertion and during rest, resoving on it's own. She denies diaphoresis, SOB, N/V. She was evaluated in the ED, and EKG showed St changes in inferior leads. And elevated troponin of 0.684. She was admitted to inpatient status.     Procedure(s):  Stent, Drug Eluting, Multi Vessel, Coronary      Hospital Course:   65 y/o female with poorly controlled DM, A1c 13.2, admitted with NSTEMI underwent cardiac cath 3/20/20 LM: Distal 30%. LAD: Osteal 80%. Mid 70%. LCX: Mid 80%. RCA: Prox 70%. Mid 80%. RCA: VIJAYA 2.5 x 15 mm & 2.5 x 15 mm. LCX: VIJAAY 2.25 x 15 mm. LAD: VIJAYA 2.5 x 12 mm. 2.25 x 18 mm. ECHO showed Normal left ventricular size and overall systolic function. Basal inferior hypokinesia. EF 60%. Moderate diastolic dysfunction. Mildly dilated LA. Patient placed on atorvastatin, ASA, plavix (for 12 months), ACEi. Long discussion with patient regarding control to reduce future risk for more cardiovascular events. All prescriptions provided to patient at bedside. Stable for discharge home.      Consults:   Consults (From admission, onward)        Status Ordering Provider     Inpatient consult to Cardiology  Once     Provider:  Robyn Little MD    Completed DAWN, " TAL JOY     Inpatient consult to Social Work  Once     Provider:  (Not yet assigned)    Completed CHIDI JOHNSON          No new Assessment & Plan notes have been filed under this hospital service since the last note was generated.  Service: Hospital Medicine    Final Active Diagnoses:    Diagnosis Date Noted POA    PRINCIPAL PROBLEM:  Non-ST elevated myocardial infarction (non-STEMI) [I21.4] 03/19/2020 Yes    Coronary artery disease [I25.10] 03/20/2020 Yes    History of coronary artery stent placement [Z95.5] 03/20/2020 Not Applicable    Overweight [E66.3] 03/20/2020 Yes    Diabetes mellitus, type 2 [E11.9] 03/19/2020 Yes      Problems Resolved During this Admission:       Discharged Condition: stable    Disposition: Home or Self Care    Follow Up:  Follow-up Information     Robyn Little MD. Schedule an appointment as soon as possible for a visit in 2 weeks.    Specialty:  Cardiology  Why:  CARDIAC FOLLOW UP  Contact information:  [address] [facility] [address]  [phone]             Schedule an appointment as soon as possible for a visit with  [facility] Mikayla [facility]st.    Why:  post hospital follow-up  Contact information:  [address] [facility] [address]  [phone]                 Patient Instructions:      Diet diabetic     Notify your health care provider if you experience any of the following:  temperature >100.4     Notify your health care provider if you experience any of the following:  persistent nausea and vomiting or diarrhea     Notify your health care provider if you experience any of the following:  severe uncontrolled pain     Notify your health care provider if you experience any of the following:  difficulty breathing or increased cough     Notify your health care provider if you experience any of the following:  severe persistent headache     Notify your health care provider if you experience any of the following:  persistent dizziness,  light-headedness, or visual disturbances     Notify your health care provider if you experience any of the following:  increased confusion or weakness     Activity as tolerated       Significant Diagnostic Studies:   CMP  Sodium   Date Value Ref Range Status   03/22/2020 137 136 - 145 mmol/L Final     Potassium   Date Value Ref Range Status   03/22/2020 3.8 3.5 - 5.1 mmol/L Final     Chloride   Date Value Ref Range Status   03/22/2020 102 95 - 110 mmol/L Final     CO2   Date Value Ref Range Status   03/22/2020 23 23 - 29 mmol/L Final     Glucose   Date Value Ref Range Status   03/22/2020 188 (H) 70 - 110 mg/dL Final     BUN, Bld   Date Value Ref Range Status   03/22/2020 13 8 - 23 mg/dL Final     Creatinine   Date Value Ref Range Status   03/22/2020 0.7 0.5 - 1.4 mg/dL Final     Calcium   Date Value Ref Range Status   03/22/2020 9.6 8.7 - 10.5 mg/dL Final     Total Protein   Date Value Ref Range Status   07/31/2017 9.2 (H) 6.0 - 8.4 g/dL Final     Albumin   Date Value Ref Range Status   07/31/2017 3.9 3.5 - 5.2 g/dL Final     Total Bilirubin   Date Value Ref Range Status   07/31/2017 0.6 0.1 - 1.0 mg/dL Final     Comment:     For infants and newborns, interpretation of results should be based  on gestational age, weight and in agreement with clinical  observations.  Premature Infant recommended reference ranges:  Up to 24 hours.............<8.0 mg/dL  Up to 48 hours............<12.0 mg/dL  3-5 days..................<15.0 mg/dL  6-29 days.................<15.0 mg/dL       Alkaline Phosphatase   Date Value Ref Range Status   07/31/2017 113 55 - 135 U/L Final     AST   Date Value Ref Range Status   07/31/2017 23 10 - 40 U/L Final     ALT   Date Value Ref Range Status   07/31/2017 18 10 - 44 U/L Final     Anion Gap   Date Value Ref Range Status   03/22/2020 12 8 - 16 mmol/L Final     eGFR if    Date Value Ref Range Status   03/22/2020 >60 >60 mL/min/1.73 m^2 Final     eGFR if non    Date  "Value Ref Range Status   03/22/2020 >60 >60 mL/min/1.73 m^2 Final     Comment:     Calculation used to obtain the estimated glomerular filtration  rate (eGFR) is the CKD-EPI equation.        Lab Results   Component Value Date    HGBA1C 13.2 (H) 03/19/2020         Pending Diagnostic Studies:     None         Medications:  Reconciled Home Medications:      Medication List      START taking these medications    aspirin 81 MG EC tablet  Commonly known as:  ECOTRIN  Take 1 tablet (81 mg total) by mouth once daily.     atorvastatin 40 MG tablet  Commonly known as:  LIPITOR  Take 1 tablet (40 mg total) by mouth once daily.     BASAGLAR KWIKPEN U-100 INSULIN glargine 100 units/mL (3mL) SubQ pen  Generic drug:  insulin  Inject 15 Units into the skin once daily.     BD ULTRA-FINE MECHE PEN NEEDLE 32 gauge x 5/32" Ndle  Generic drug:  pen needle, diabetic  Use once daily.     clopidogreL 75 mg tablet  Commonly known as:  PLAVIX  Take 1 tablet (75 mg total) by mouth once daily.     lisinopriL 5 MG tablet  Commonly known as:  PRINIVIL,ZESTRIL  Take 1 tablet (5 mg total) by mouth once daily.     nitroGLYCERIN 0.4 MG SL tablet  Commonly known as:  NITROSTAT  Place 1 tablet (0.4 mg total) under the tongue every 5 (five) minutes as needed for Chest pain.     TRUE METRIX GLUCOSE METER Misc  Generic drug:  blood-glucose meter  USE AS DIRECTED     TRUE METRIX GLUCOSE TEST STRIP Strp  Generic drug:  blood sugar diagnostic  1 each by Misc.(Non-Drug; Combo Route) route 3 (three) times daily.     TRUEPLUS LANCETS 30 gauge Misc  Generic drug:  lancets  TEST AS DIRECTED  3 (three) times daily.        CONTINUE taking these medications    cetirizine 10 MG tablet  Commonly known as:  ZYRTEC  Take 1 tablet (10 mg total) by mouth once daily.     fluticasone propionate 50 mcg/actuation nasal spray  Commonly known as:  FLONASE  1 spray (50 mcg total) by Each Nostril route 2 (two) times daily as needed for Rhinitis.        STOP taking these " medications    metFORMIN 500 MG tablet  Commonly known as:  GLUCOPHAGE            Indwelling Lines/Drains at time of discharge:   Lines/Drains/Airways     None                 Time spent on the discharge of patient: >30 minutes  Patient was seen and examined on the date of discharge and determined to be suitable for discharge.         Marcella Wheeler PA-C  Department of Hospital Medicine  [facility]

## 2020-03-23 NOTE — PLAN OF CARE
Problem: Occupational Therapy Goal  Goal: Occupational Therapy Goal  Description  Goals to be met by: 3/23/20    Patient will increase functional independence with ADLs by:    Demonstrating understanding of adaptive techniques for UB self care tasks.   MET  Demonstrating understanding of precautions for right UE.  MET  Demonstrating understanding of safety strategies to reduce fall risk during ADLs.  MET       Outcome: Met

## 2020-03-24 NOTE — PLAN OF CARE
03/24/20 0756   Final Note   Assessment Type Final Discharge Note   Anticipated Discharge Disposition Home   What phone number can be called within the next 1-3 days to see how you are doing after discharge?   ([phone])   Hospital Follow Up  Appt(s) scheduled? No

## 2020-03-25 ENCOUNTER — PATIENT OUTREACH (OUTPATIENT)
Dept: ADMINISTRATIVE | Facility: CLINIC | Age: 65
End: 2020-03-25

## 2020-03-25 NOTE — PROGRESS NOTES
"Please forward this important TCC information to your provider in order to maximize the post discharge care delivery of this patient.    C3 nurse spoke with silverio JENNIFER Purvis for a TCC post hospital discharge follow up call. The patient does not have a scheduled HOSFU appointment with [facility] within 7-14 days post hospital discharge date 03/23/20. C3 nurse was unable to schedule appt with [facility].  Please contact patient and schedule follow up appointment using HOSFU visit.    Respectfully,  MARY Arrington  [facility]    [email or web address]       Please do not reply to this message, as this inbox is not routinely monitored.2    Spoke to pt daughter and virtual phone visit with [facility] is scheduled for tomorrow for hospital follow up. Educated on red flags, med rec with no medication questions per patient.  Patient states that she is "feeling well".  "

## 2020-03-26 NOTE — HOSPITAL COURSE
65 y/o female with poorly controlled DM, A1c 13.2, admitted with NSTEMI underwent cardiac cath 3/20/20 LM: Distal 30%. LAD: Osteal 80%. Mid 70%. LCX: Mid 80%. RCA: Prox 70%. Mid 80%. RCA: VIJAYA 2.5 x 15 mm & 2.5 x 15 mm. LCX: VIJAYA 2.25 x 15 mm. LAD: VIJAYA 2.5 x 12 mm. 2.25 x 18 mm. ECHO showed Normal left ventricular size and overall systolic function. Basal inferior hypokinesia. EF 60%. Moderate diastolic dysfunction. Mildly dilated LA. Patient placed on atorvastatin, ASA, plavix (for 12 months), ACEi. Long discussion with patient regarding control to reduce future risk for more cardiovascular events. All prescriptions provided to patient at bedside. Stable for discharge home.

## 2020-04-20 RX ORDER — LISINOPRIL 5 MG/1
5 TABLET ORAL DAILY
Qty: 30 TABLET | Refills: 0 | OUTPATIENT
Start: 2020-04-20 | End: 2020-04-24 | Stop reason: SDUPTHER

## 2020-04-24 ENCOUNTER — OFFICE VISIT (OUTPATIENT)
Dept: CARDIOLOGY | Facility: CLINIC | Age: 65
End: 2020-04-24
Attending: INTERNAL MEDICINE
Payer: MEDICAID

## 2020-04-24 VITALS
DIASTOLIC BLOOD PRESSURE: 67 MMHG | HEIGHT: 64 IN | HEART RATE: 68 BPM | WEIGHT: 150 LBS | BODY MASS INDEX: 25.61 KG/M2 | SYSTOLIC BLOOD PRESSURE: 145 MMHG

## 2020-04-24 DIAGNOSIS — Z79.4 TYPE 2 DIABETES MELLITUS WITH OTHER CIRCULATORY COMPLICATION, WITH LONG-TERM CURRENT USE OF INSULIN: ICD-10-CM

## 2020-04-24 DIAGNOSIS — Z95.5 HISTORY OF CORONARY ARTERY STENT PLACEMENT: ICD-10-CM

## 2020-04-24 DIAGNOSIS — I25.2 HISTORY OF MYOCARDIAL INFARCTION: ICD-10-CM

## 2020-04-24 DIAGNOSIS — E66.3 OVERWEIGHT: ICD-10-CM

## 2020-04-24 DIAGNOSIS — I10 ESSENTIAL HYPERTENSION: ICD-10-CM

## 2020-04-24 DIAGNOSIS — E11.59 TYPE 2 DIABETES MELLITUS WITH OTHER CIRCULATORY COMPLICATION, WITH LONG-TERM CURRENT USE OF INSULIN: ICD-10-CM

## 2020-04-24 DIAGNOSIS — I25.10 CORONARY ARTERY DISEASE INVOLVING NATIVE CORONARY ARTERY OF NATIVE HEART WITHOUT ANGINA PECTORIS: ICD-10-CM

## 2020-04-24 PROCEDURE — 99214 PR OFFICE/OUTPT VISIT, EST, LEVL IV, 30-39 MIN: ICD-10-PCS | Mod: S$GLB,,, | Performed by: INTERNAL MEDICINE

## 2020-04-24 PROCEDURE — 99214 OFFICE O/P EST MOD 30 MIN: CPT | Mod: S$GLB,,, | Performed by: INTERNAL MEDICINE

## 2020-04-24 RX ORDER — CLOPIDOGREL BISULFATE 75 MG/1
75 TABLET ORAL DAILY
Qty: 90 TABLET | Refills: 3 | Status: ON HOLD | OUTPATIENT
Start: 2020-04-24 | End: 2020-10-31 | Stop reason: HOSPADM

## 2020-04-24 RX ORDER — LISINOPRIL 5 MG/1
5 TABLET ORAL DAILY
Qty: 90 TABLET | Refills: 3 | Status: SHIPPED | OUTPATIENT
Start: 2020-04-24 | End: 2020-12-03 | Stop reason: SDUPTHER

## 2020-04-24 RX ORDER — NITROGLYCERIN 0.4 MG/1
0.4 TABLET SUBLINGUAL EVERY 5 MIN PRN
Qty: 25 TABLET | Refills: 3 | Status: SHIPPED | OUTPATIENT
Start: 2020-04-24 | End: 2020-10-04

## 2020-04-24 RX ORDER — ASPIRIN 81 MG/1
81 TABLET ORAL DAILY
Qty: 90 TABLET | Refills: 3 | Status: SHIPPED | OUTPATIENT
Start: 2020-04-24 | End: 2020-12-03 | Stop reason: SDUPTHER

## 2020-04-24 RX ORDER — ATORVASTATIN CALCIUM 40 MG/1
40 TABLET, FILM COATED ORAL DAILY
Qty: 90 TABLET | Refills: 3 | Status: ON HOLD | OUTPATIENT
Start: 2020-04-24 | End: 2020-10-31 | Stop reason: HOSPADM

## 2020-04-24 NOTE — PROGRESS NOTES
Subjective:     Jamee Purvis is a 65 y.o. female with hypertension and diabetes mellitus, type 2. She is overweight. Beginning about 3/11/2020 she began feeling jaw pain on and off with mild SOB. She thought she had a sinus issues and was seen in the ER. He occasional jaw pain persisted. In the evening of 3/18/2020 the jaw pain was followed by moderate chest tightness. She became concerned and presented to the ER. She was admitted. On 3/20/2020 she underwent coronary angiography that revealed severe 3V CAD. She underwent 3 vessel stenting with 5 VIJAYA inserted. She denies exertional chest pain or dyspnea. No palpitations or weak spells. No bleeding. Feeling well overall.       Coronary Artery Disease   Presents for follow-up visit. Pertinent negatives include no chest pain, chest pressure, chest tightness, dizziness, leg swelling, muscle weakness, palpitations, shortness of breath or weight gain.   Hypertension   This is a new problem. The current episode started more than 1 month ago. The problem is unchanged. The problem is controlled. Pertinent negatives include no anxiety, blurred vision, chest pain, headaches, malaise/fatigue, neck pain, orthopnea, palpitations, peripheral edema, PND, shortness of breath or sweats.       Review of Systems   Constitution: Negative for chills, fever, malaise/fatigue and weight gain.   HENT: Negative for nosebleeds.    Eyes: Negative for blurred vision, double vision, vision loss in left eye and vision loss in right eye.   Cardiovascular: Negative for chest pain, claudication, dyspnea on exertion, irregular heartbeat, leg swelling, near-syncope, orthopnea, palpitations, paroxysmal nocturnal dyspnea and syncope.   Respiratory: Negative for chest tightness, cough, hemoptysis, shortness of breath and wheezing.    Endocrine: Negative for cold intolerance and heat intolerance.   Hematologic/Lymphatic: Negative for bleeding problem. Does not bruise/bleed easily.   Skin: Negative for  color change and rash.   Musculoskeletal: Negative for back pain, falls, muscle weakness, myalgias and neck pain.   Gastrointestinal: Negative for heartburn, hematemesis, hematochezia, hemorrhoids, jaundice, melena, nausea and vomiting.   Genitourinary: Negative for dysuria and hematuria.   Neurological: Positive for tremors. Negative for dizziness, focal weakness, headaches, light-headedness, loss of balance, numbness, vertigo and weakness.   Psychiatric/Behavioral: Negative for altered mental status, depression and memory loss. The patient is not nervous/anxious.    Allergic/Immunologic: Negative for hives and persistent infections.       Current Outpatient Medications on File Prior to Visit   Medication Sig Dispense Refill    aspirin (ECOTRIN) 81 MG EC tablet Take 1 tablet (81 mg total) by mouth once daily. 30 tablet 1    atorvastatin (LIPITOR) 40 MG tablet Take 1 tablet (40 mg total) by mouth once daily. 30 tablet 1    blood sugar diagnostic Strp 1 each by Misc.(Non-Drug; Combo Route) route 3 (three) times daily. 100 each 3    blood-glucose meter Misc USE AS DIRECTED 1 each 0    cetirizine (ZYRTEC) 10 MG tablet Take 1 tablet (10 mg total) by mouth once daily. 30 tablet 0    clopidogreL (PLAVIX) 75 mg tablet Take 1 tablet (75 mg total) by mouth once daily. 30 tablet 1    fluticasone propionate (FLONASE) 50 mcg/actuation nasal spray 1 spray (50 mcg total) by Each Nostril route 2 (two) times daily as needed for Rhinitis. 15 g 0    insulin glargine 100 units/mL (3mL) SubQ pen Inject 15 Units into the skin once daily. 15 mL 0    lancets 30 gauge Misc TEST AS DIRECTED  3 (three) times daily. 100 each 0    lisinopriL (PRINIVIL,ZESTRIL) 5 MG tablet Take 1 tablet (5 mg total) by mouth once daily. Need to establish care with provider for more refills. 30 tablet 0    nitroGLYCERIN (NITROSTAT) 0.4 MG SL tablet Place 1 tablet (0.4 mg total) under the tongue every 5 (five) minutes as needed for Chest pain. 25  "tablet 1    pen needle, diabetic (BD ULTRA-FINE MECHE PEN NEEDLE) 32 gauge x 5/32" Ndle Use once daily. 100 each 0     No current facility-administered medications on file prior to visit.        BP (!) 145/67   Pulse 68   Ht 5' 4" (1.626 m)   Wt 68 kg (150 lb)   BMI 25.75 kg/m²       Objective:     Physical Exam   Constitutional: She is oriented to person, place, and time. She appears well-developed and well-nourished.  Non-toxic appearance. No distress.   HENT:   Head: Normocephalic and atraumatic.   Nose: Nose normal.   Eyes: Right eye exhibits no discharge. Left eye exhibits no discharge. Right conjunctiva is not injected. Left conjunctiva is not injected. Right pupil is round. Left pupil is round. Pupils are equal.   Neck: Neck supple. No JVD present. Carotid bruit is not present. No thyromegaly present.   Cardiovascular: Normal rate, regular rhythm, S1 normal and S2 normal.  No extrasystoles are present. PMI is not displaced. Exam reveals gallop and S4. Exam reveals no S3.   Pulses:       Radial pulses are 2+ on the right side, and 2+ on the left side.        Femoral pulses are 2+ on the right side, and 2+ on the left side.       Dorsalis pedis pulses are 2+ on the right side, and 2+ on the left side.        Posterior tibial pulses are 2+ on the right side, and 2+ on the left side.   Pulmonary/Chest: Effort normal and breath sounds normal.   Abdominal: Soft. Normal appearance. There is no hepatosplenomegaly. There is no tenderness.   Musculoskeletal:        Right ankle: She exhibits no swelling, no ecchymosis and no deformity.        Left ankle: She exhibits no swelling, no ecchymosis and no deformity.   Lymphadenopathy:        Head (right side): No submandibular adenopathy present.        Head (left side): No submandibular adenopathy present.     She has no cervical adenopathy.   Neurological: She is alert and oriented to person, place, and time. She is not disoriented. No cranial nerve deficit.   Skin: " Skin is warm, dry and intact. No rash noted. She is not diaphoretic.   Psychiatric: She has a normal mood and affect. Her speech is normal and behavior is normal. Judgment and thought content normal. Cognition and memory are normal.        Assessment:     1. Coronary artery disease involving native coronary artery of native heart without angina pectoris    2. History of myocardial infarction    3. History of coronary artery stent placement    4. Type 2 diabetes mellitus with other circulatory complication, with long-term current use of insulin    5. Overweight    6. Essential hypertension        Plan:      1. Coronary Artery Disease              3/11/2020: Began experience occasional pain in jaw.              3/18/2020: Jaw and chest pain for 60 minutes. NSTEMI. Troponin 2.2.              3/19/2020: Echo: Normal left ventricular size and overall systolic function. Basal inferior hypokinesia. EF 60%. Moderate diastolic dysfunction. Mildly dilated LA.              3/20/2020: [facility]: Cath: LM: Distal 30%. LAD: Osteal 80%. Mid 70%. LCX: Mid 80%. RCA: Prox 70%. Mid 80%. RCA: VIJAYA 2.5 x 15 mm & 2.5 x 15 mm. LCX: VIJAYA 2.25 x 15 mm. LAD: VIJAYA 2.5 x 12 mm. 2.25 x 18 mm.               3/21/2020: Reviewed findings and showed angiogram for patient and discussed implications in detail.              3/19/2020: Chol 171. HDL 58. . TG 59.              On atorvastatin 40 mg Q24.              On aspirin 81 mg Q24.              On clopidogrel 75 mg Q24 for 12 months to 4/1/2021.              On NTG 0.4 mg sl PRN.     2. Hypertension   2020: Diagnosed.   On lisinopril 5 mg Q24.   Encouraged to check at home.      3. Diabetes Mellitus, Type 2              2006: Diagnosed. Complications: CAD. Medications: On insulin.              3/19/2020: HgbA1C 13.2%.              Needs excellent control to reduce future risk for more cardiovascular events.   On good diet.     4. Overweight              3/19/2020: Weight 70 kg. BMI 27.     4.  Primary Care              [facility].     F/u 2 months.    Robyn Little M.D.

## 2020-06-25 ENCOUNTER — OFFICE VISIT (OUTPATIENT)
Dept: CARDIOLOGY | Facility: CLINIC | Age: 65
End: 2020-06-25
Attending: INTERNAL MEDICINE
Payer: MEDICAID

## 2020-06-25 VITALS
DIASTOLIC BLOOD PRESSURE: 60 MMHG | SYSTOLIC BLOOD PRESSURE: 108 MMHG | HEIGHT: 64 IN | BODY MASS INDEX: 25.97 KG/M2 | HEART RATE: 64 BPM | WEIGHT: 152.13 LBS

## 2020-06-25 DIAGNOSIS — E66.3 OVERWEIGHT: ICD-10-CM

## 2020-06-25 DIAGNOSIS — I10 ESSENTIAL HYPERTENSION: ICD-10-CM

## 2020-06-25 DIAGNOSIS — E11.59 TYPE 2 DIABETES MELLITUS WITH OTHER CIRCULATORY COMPLICATION, WITH LONG-TERM CURRENT USE OF INSULIN: ICD-10-CM

## 2020-06-25 DIAGNOSIS — Z95.5 HISTORY OF CORONARY ARTERY STENT PLACEMENT: ICD-10-CM

## 2020-06-25 DIAGNOSIS — I25.2 HISTORY OF MYOCARDIAL INFARCTION: ICD-10-CM

## 2020-06-25 DIAGNOSIS — Z79.4 TYPE 2 DIABETES MELLITUS WITH OTHER CIRCULATORY COMPLICATION, WITH LONG-TERM CURRENT USE OF INSULIN: ICD-10-CM

## 2020-06-25 DIAGNOSIS — I25.10 CORONARY ARTERY DISEASE INVOLVING NATIVE CORONARY ARTERY OF NATIVE HEART WITHOUT ANGINA PECTORIS: ICD-10-CM

## 2020-06-25 PROCEDURE — 99999 PR PBB SHADOW E&M-EST. PATIENT-LVL III: ICD-10-PCS | Mod: PBBFAC,,, | Performed by: INTERNAL MEDICINE

## 2020-06-25 PROCEDURE — 99214 PR OFFICE/OUTPT VISIT, EST, LEVL IV, 30-39 MIN: ICD-10-PCS | Mod: S$PBB,,, | Performed by: INTERNAL MEDICINE

## 2020-06-25 PROCEDURE — 99214 OFFICE O/P EST MOD 30 MIN: CPT | Mod: S$PBB,,, | Performed by: INTERNAL MEDICINE

## 2020-06-25 PROCEDURE — 99999 PR PBB SHADOW E&M-EST. PATIENT-LVL III: CPT | Mod: PBBFAC,,, | Performed by: INTERNAL MEDICINE

## 2020-06-25 PROCEDURE — 99213 OFFICE O/P EST LOW 20 MIN: CPT | Mod: PBBFAC | Performed by: INTERNAL MEDICINE

## 2020-06-25 NOTE — PROGRESS NOTES
Subjective:     Jamee Purvis is a 65 y.o. female with hypertension and diabetes mellitus, type 2. She is overweight. Beginning about 3/11/2020 she began feeling jaw pain on and off with mild SOB. She thought she had a sinus issues and was seen in the ER. He occasional jaw pain persisted. In the evening of 3/18/2020 the jaw pain was followed by moderate chest tightness. She became concerned and presented to the ER. She was admitted. On 3/20/2020 she underwent coronary angiography that revealed severe 3V CAD. She underwent 3 vessel stenting with 5 VIJAYA inserted. She denies exertional chest pain or dyspnea. No palpitations or weak spells. No bleeding. Feeling well overall.       Coronary Artery Disease  Presents for follow-up visit. Pertinent negatives include no chest pain, chest pressure, chest tightness, dizziness, leg swelling, muscle weakness, palpitations, shortness of breath or weight gain.   Hypertension  This is a new problem. The current episode started more than 1 month ago. The problem is unchanged. The problem is controlled. Pertinent negatives include no anxiety, blurred vision, chest pain, headaches, malaise/fatigue, neck pain, orthopnea, palpitations, peripheral edema, PND, shortness of breath or sweats.       Review of Systems   Constitution: Negative for chills, fever, malaise/fatigue and weight gain.   HENT: Negative for nosebleeds.    Eyes: Negative for blurred vision, double vision, vision loss in left eye and vision loss in right eye.   Cardiovascular: Negative for chest pain, claudication, dyspnea on exertion, irregular heartbeat, leg swelling, near-syncope, orthopnea, palpitations, paroxysmal nocturnal dyspnea and syncope.   Respiratory: Negative for chest tightness, cough, hemoptysis, shortness of breath and wheezing.    Endocrine: Negative for cold intolerance and heat intolerance.   Hematologic/Lymphatic: Negative for bleeding problem. Does not bruise/bleed easily.   Skin: Negative for  color change and rash.   Musculoskeletal: Negative for back pain, falls, muscle weakness, myalgias and neck pain.   Gastrointestinal: Negative for heartburn, hematemesis, hematochezia, hemorrhoids, jaundice, melena, nausea and vomiting.   Genitourinary: Negative for dysuria, hematuria and menorrhagia.   Neurological: Positive for tremors. Negative for dizziness, focal weakness, headaches, light-headedness, loss of balance, numbness, vertigo and weakness.   Psychiatric/Behavioral: Negative for altered mental status, depression and memory loss. The patient is not nervous/anxious.    Allergic/Immunologic: Negative for hives and persistent infections.       Current Outpatient Medications on File Prior to Visit   Medication Sig Dispense Refill    aspirin (ECOTRIN) 81 MG EC tablet Take 1 tablet (81 mg total) by mouth once daily. 90 tablet 3    atorvastatin (LIPITOR) 40 MG tablet Take 1 tablet (40 mg total) by mouth once daily. 90 tablet 3    clopidogreL (PLAVIX) 75 mg tablet Take 1 tablet (75 mg total) by mouth once daily. 90 tablet 3    insulin glargine 100 units/mL (3mL) SubQ pen Inject 15 Units into the skin once daily. 15 mL 0    lisinopriL (PRINIVIL,ZESTRIL) 5 MG tablet Take 1 tablet (5 mg total) by mouth once daily. Need to establish care with provider for more refills. 90 tablet 3    blood sugar diagnostic Strp 1 each by Misc.(Non-Drug; Combo Route) route 3 (three) times daily. 100 each 3    blood-glucose meter Misc USE AS DIRECTED 1 each 0    cetirizine (ZYRTEC) 10 MG tablet Take 1 tablet (10 mg total) by mouth once daily. (Patient not taking: Reported on 6/25/2020) 30 tablet 0    fluticasone propionate (FLONASE) 50 mcg/actuation nasal spray 1 spray (50 mcg total) by Each Nostril route 2 (two) times daily as needed for Rhinitis. (Patient not taking: Reported on 6/25/2020) 15 g 0    lancets 30 gauge Misc TEST AS DIRECTED  3 (three) times daily. 100 each 0    nitroGLYCERIN (NITROSTAT) 0.4 MG SL tablet  "Place 1 tablet (0.4 mg total) under the tongue every 5 (five) minutes as needed for Chest pain. (Patient not taking: Reported on 6/25/2020) 25 tablet 3    pen needle, diabetic (BD ULTRA-FINE MECHE PEN NEEDLE) 32 gauge x 5/32" Ndle Use once daily. 100 each 0     No current facility-administered medications on file prior to visit.        /60   Pulse 64   Ht 5' 4" (1.626 m)   Wt 69 kg (152 lb 1.9 oz)   BMI 26.11 kg/m²       Objective:     Physical Exam   Constitutional: She is oriented to person, place, and time. She appears well-developed and well-nourished.  Non-toxic appearance. No distress.   HENT:   Head: Normocephalic and atraumatic.   Nose: Nose normal.   Eyes: Right eye exhibits no discharge. Left eye exhibits no discharge. Right conjunctiva is not injected. Left conjunctiva is not injected. Right pupil is round. Left pupil is round. Pupils are equal.   Neck: Neck supple. No JVD present. Carotid bruit is not present. No thyromegaly present.   Cardiovascular: Normal rate, regular rhythm, S1 normal and S2 normal.  No extrasystoles are present. PMI is not displaced. Exam reveals gallop and S4. Exam reveals no S3.   Pulses:       Radial pulses are 2+ on the right side and 2+ on the left side.        Femoral pulses are 2+ on the right side and 2+ on the left side.       Dorsalis pedis pulses are 2+ on the right side and 2+ on the left side.        Posterior tibial pulses are 2+ on the right side and 2+ on the left side.   Pulmonary/Chest: Effort normal and breath sounds normal.   Abdominal: Soft. Normal appearance. There is no hepatosplenomegaly. There is no abdominal tenderness.   Musculoskeletal:      Right ankle: She exhibits no swelling, no ecchymosis and no deformity.      Left ankle: She exhibits no swelling, no ecchymosis and no deformity.   Lymphadenopathy:        Head (right side): No submandibular adenopathy present.        Head (left side): No submandibular adenopathy present.     She has no " cervical adenopathy.   Neurological: She is alert and oriented to person, place, and time. She is not disoriented. No cranial nerve deficit.   Skin: Skin is warm, dry and intact. No rash noted. She is not diaphoretic.   Psychiatric: She has a normal mood and affect. Her speech is normal and behavior is normal. Judgment and thought content normal. Cognition and memory are normal.        Assessment:     1. Coronary artery disease involving native coronary artery of native heart without angina pectoris    2. History of myocardial infarction    3. History of coronary artery stent placement    4. Essential hypertension    5. Type 2 diabetes mellitus with other circulatory complication, with long-term current use of insulin    6. Overweight        Plan:      1. Coronary Artery Disease              3/11/2020: Began experience occasional pain in jaw.              3/18/2020: Jaw and chest pain for 60 minutes. NSTEMI. Troponin 2.2.              3/19/2020: Echo: Normal left ventricular size and overall systolic function. Basal inferior hypokinesia. EF 60%. Moderate diastolic dysfunction. Mildly dilated LA.              3/20/2020: [facility]: Cath: LM: Distal 30%. LAD: Osteal 80%. Mid 70%. LCX: Mid 80%. RCA: Prox 70%. Mid 80%. RCA: VIJAYA 2.5 x 15 mm & 2.5 x 15 mm. LCX: VIJAYA 2.25 x 15 mm. LAD: VIJAYA 2.5 x 12 mm. 2.25 x 18 mm.               3/21/2020: Reviewed findings and showed angiogram for patient and discussed implications in detail.              3/19/2020: Chol 171. HDL 58. . TG 59.              On atorvastatin 40 mg Q24.              On aspirin 81 mg Q24.              On clopidogrel 75 mg Q24 for 12 months to 4/1/2021.              On NTG 0.4 mg sl PRN.    6/24/2020: Do lipid panel.    2. Hypertension   2020: Diagnosed.   On lisinopril 5 mg Q24.   Encouraged to check at home.      3. Diabetes Mellitus, Type 2              2006: Diagnosed. Complications: CAD. Medications: On insulin.              3/19/2020: HgbA1C 13.2%.               Needs excellent control to reduce future risk for more cardiovascular events.   On good diet.     4. Overweight              3/19/2020: Weight 70 kg. BMI 27.     4. Primary Care              [facility].     F/u 2 months.    Robyn Little M.D.

## 2020-10-22 ENCOUNTER — OFFICE VISIT (OUTPATIENT)
Dept: CARDIOLOGY | Facility: CLINIC | Age: 65
End: 2020-10-22
Attending: INTERNAL MEDICINE
Payer: MEDICAID

## 2020-10-22 VITALS
BODY MASS INDEX: 26.54 KG/M2 | SYSTOLIC BLOOD PRESSURE: 141 MMHG | DIASTOLIC BLOOD PRESSURE: 75 MMHG | HEART RATE: 62 BPM | WEIGHT: 155.44 LBS | HEIGHT: 64 IN

## 2020-10-22 DIAGNOSIS — Z79.4 TYPE 2 DIABETES MELLITUS WITH OTHER CIRCULATORY COMPLICATION, WITH LONG-TERM CURRENT USE OF INSULIN: ICD-10-CM

## 2020-10-22 DIAGNOSIS — E11.59 TYPE 2 DIABETES MELLITUS WITH OTHER CIRCULATORY COMPLICATION, WITH LONG-TERM CURRENT USE OF INSULIN: ICD-10-CM

## 2020-10-22 DIAGNOSIS — I10 ESSENTIAL HYPERTENSION: ICD-10-CM

## 2020-10-22 DIAGNOSIS — I25.118 CORONARY ARTERY DISEASE OF NATIVE ARTERY OF NATIVE HEART WITH STABLE ANGINA PECTORIS: ICD-10-CM

## 2020-10-22 DIAGNOSIS — I25.2 HISTORY OF MYOCARDIAL INFARCTION: ICD-10-CM

## 2020-10-22 DIAGNOSIS — E66.3 OVERWEIGHT: ICD-10-CM

## 2020-10-22 DIAGNOSIS — Z95.5 HISTORY OF CORONARY ARTERY STENT PLACEMENT: ICD-10-CM

## 2020-10-22 PROCEDURE — 99215 OFFICE O/P EST HI 40 MIN: CPT | Mod: S$PBB,25,, | Performed by: INTERNAL MEDICINE

## 2020-10-22 PROCEDURE — 93005 ELECTROCARDIOGRAM TRACING: CPT

## 2020-10-22 PROCEDURE — 99213 OFFICE O/P EST LOW 20 MIN: CPT | Mod: PBBFAC,25 | Performed by: INTERNAL MEDICINE

## 2020-10-22 PROCEDURE — 99999 PR PBB SHADOW E&M-EST. PATIENT-LVL III: CPT | Mod: PBBFAC,,, | Performed by: INTERNAL MEDICINE

## 2020-10-22 PROCEDURE — 93010 EKG 12-LEAD: ICD-10-PCS | Mod: ,,, | Performed by: INTERNAL MEDICINE

## 2020-10-22 PROCEDURE — 99215 PR OFFICE/OUTPT VISIT, EST, LEVL V, 40-54 MIN: ICD-10-PCS | Mod: S$PBB,25,, | Performed by: INTERNAL MEDICINE

## 2020-10-22 PROCEDURE — 93010 ELECTROCARDIOGRAM REPORT: CPT | Mod: ,,, | Performed by: INTERNAL MEDICINE

## 2020-10-22 PROCEDURE — 99999 PR PBB SHADOW E&M-EST. PATIENT-LVL III: ICD-10-PCS | Mod: PBBFAC,,, | Performed by: INTERNAL MEDICINE

## 2020-10-22 NOTE — PATIENT INSTRUCTIONS
Procedure: Coronary Angiogram  Procedure date: Tuesday, 10/27/2020  Procedure time: 7:30 a.m.    1. Arrive at the Outpatient Surgery Unit ([facility]) at 6:00 a.m.  2. Nothing to eat or drink after midnight.  3. Take all morning medication with a sip of water.  a. If you are diabetic, do not take any diabetes medication the morning of the procedure.   4. Please make arrangements for a family member or friend to bring you home after the procedure. You will not be able to drive home.      Please call Pre Admit at [phone] to schedule a pre admit appointment.    COVID 19 test must be performed 72 hours prior to the scheduled procedure.     If you have any questions, please call our office at [phone].

## 2020-10-22 NOTE — PROGRESS NOTES
Subjective:     Jamee Purvis is a 65 y.o. female with hypertension and diabetes mellitus, type 2. She is overweight. Beginning about 3/11/2020 she began feeling jaw pain on and off with mild SOB. She thought she had a sinus issues and was seen in the ER. He occasional jaw pain persisted. In the evening of 3/18/2020 the jaw pain was followed by moderate chest tightness. She became concerned and presented to the ER. She was admitted. On 3/20/2020 she underwent coronary angiography that revealed severe 3V CAD. She underwent 3 vessel stenting with 5 VIJAYA inserted. She became free from angina. In 9/2020 she began experience exertional chest pressure with discomfort radiating to the her jaw. She has not had any chest pain at rest. She denies exertionaldyspnea. No palpitations or weak spells. No bleeding. Feeling fair overall.       Coronary Artery Disease  Presents for follow-up visit. Symptoms include chest pain. Pertinent negatives include no chest pressure, chest tightness, dizziness, leg swelling, muscle weakness, palpitations, shortness of breath or weight gain. Risk factors include hyperlipidemia. Risk factors do not include obesity. The symptoms have been stable.   Hypertension  This is a new problem. The current episode started more than 1 month ago. The problem is unchanged. The problem is controlled ([place]ully 120-130/70-80 mmHg at home). Associated symptoms include chest pain. Pertinent negatives include no anxiety, blurred vision, headaches, malaise/fatigue, neck pain, orthopnea, palpitations, peripheral edema, PND, shortness of breath or sweats. There is no history of chronic renal disease.   Hyperlipidemia  This is a chronic problem. She has no history of chronic renal disease, diabetes, hypothyroidism, liver disease, obesity or nephrotic syndrome. Associated symptoms include chest pain. Pertinent negatives include no focal sensory loss, focal weakness, leg pain, myalgias or shortness of breath.        Review of Systems   Constitution: Negative for chills, fever, malaise/fatigue and weight gain.   HENT: Negative for nosebleeds.    Eyes: Negative for blurred vision, double vision, vision loss in left eye and vision loss in right eye.   Cardiovascular: Positive for chest pain. Negative for claudication, dyspnea on exertion, irregular heartbeat, leg swelling, near-syncope, orthopnea, palpitations, paroxysmal nocturnal dyspnea and syncope.   Respiratory: Negative for chest tightness, cough, hemoptysis, shortness of breath and wheezing.    Endocrine: Negative for cold intolerance and heat intolerance.   Hematologic/Lymphatic: Negative for bleeding problem. Does not bruise/bleed easily.   Skin: Negative for color change and rash.   Musculoskeletal: Negative for back pain, falls, muscle weakness, myalgias and neck pain.   Gastrointestinal: Negative for heartburn, hematemesis, hematochezia, hemorrhoids, jaundice, melena, nausea and vomiting.   Genitourinary: Negative for dysuria, hematuria and menorrhagia.   Neurological: Positive for tremors. Negative for dizziness, focal weakness, headaches, light-headedness, loss of balance, numbness, vertigo and weakness.   Psychiatric/Behavioral: Negative for altered mental status, depression and memory loss. The patient is not nervous/anxious.    Allergic/Immunologic: Negative for hives and persistent infections.       Current Outpatient Medications on File Prior to Visit   Medication Sig Dispense Refill    aspirin (ECOTRIN) 81 MG EC tablet Take 1 tablet (81 mg total) by mouth once daily. 90 tablet 3    atorvastatin (LIPITOR) 40 MG tablet Take 1 tablet (40 mg total) by mouth once daily. 90 tablet 3    blood sugar diagnostic Strp 1 each by Misc.(Non-Drug; Combo Route) route 3 (three) times daily. 100 each 3    blood-glucose meter Misc USE AS DIRECTED 1 each 0    clopidogreL (PLAVIX) 75 mg tablet Take 1 tablet (75 mg total) by mouth once daily. 90 tablet 3    insulin  "glargine 100 units/mL (3mL) SubQ pen Inject 15 Units into the skin once daily. 15 mL 0    lancets 30 gauge Misc TEST AS DIRECTED  3 (three) times daily. 100 each 0    lisinopriL (PRINIVIL,ZESTRIL) 5 MG tablet Take 1 tablet (5 mg total) by mouth once daily. Need to establish care with provider for more refills. 90 tablet 3    nitroGLYCERIN (NITROSTAT) 0.4 MG SL tablet DISSOLVE 1 TABLET UNDER THE TONGUE EVERY 5 MINUTES AS NEEDED FOR CHEST PAIN. NO MORE THAN 3 DOSES IN 24 HOURS 25 tablet 3    pen needle, diabetic (BD ULTRA-FINE MECHE PEN NEEDLE) 32 gauge x 5/32" Ndle Use once daily. 100 each 0    cetirizine (ZYRTEC) 10 MG tablet Take 1 tablet (10 mg total) by mouth once daily. (Patient not taking: Reported on 6/25/2020) 30 tablet 0    fluticasone propionate (FLONASE) 50 mcg/actuation nasal spray 1 spray (50 mcg total) by Each Nostril route 2 (two) times daily as needed for Rhinitis. (Patient not taking: Reported on 6/25/2020) 15 g 0     No current facility-administered medications on file prior to visit.        BP (!) 141/75 (BP Location: Left arm, Patient Position: Sitting, BP Method: Large (Automatic))   Pulse 62   Ht 5' 4" (1.626 m)   Wt 70.5 kg (155 lb 6.8 oz)   BMI 26.68 kg/m²       Objective:     Physical Exam   Constitutional: She is oriented to person, place, and time. She appears well-developed and well-nourished.  Non-toxic appearance. No distress.   HENT:   Head: Normocephalic and atraumatic.   Nose: Nose normal.   Eyes: Right eye exhibits no discharge. Left eye exhibits no discharge. Right conjunctiva is not injected. Left conjunctiva is not injected. Right pupil is round. Left pupil is round. Pupils are equal.   Neck: Neck supple. No JVD present. Carotid bruit is not present. No thyromegaly present.   Cardiovascular: Normal rate, regular rhythm, S1 normal and S2 normal.  No extrasystoles are present. PMI is not displaced. Exam reveals gallop and S4. Exam reveals no S3.   Pulses:       Radial " pulses are 2+ on the right side and 2+ on the left side.        Femoral pulses are 2+ on the right side and 2+ on the left side.       Dorsalis pedis pulses are 2+ on the right side and 2+ on the left side.        Posterior tibial pulses are 2+ on the right side and 2+ on the left side.   Pulmonary/Chest: Effort normal and breath sounds normal.   Abdominal: Soft. Normal appearance. There is no hepatosplenomegaly. There is no abdominal tenderness.   Musculoskeletal:      Right ankle: She exhibits no swelling, no ecchymosis and no deformity.      Left ankle: She exhibits no swelling, no ecchymosis and no deformity.   Lymphadenopathy:        Head (right side): No submandibular adenopathy present.        Head (left side): No submandibular adenopathy present.     She has no cervical adenopathy.   Neurological: She is alert and oriented to person, place, and time. She is not disoriented. No cranial nerve deficit.   Skin: Skin is warm, dry and intact. No rash noted. She is not diaphoretic.   Psychiatric: She has a normal mood and affect. Her speech is normal and behavior is normal. Judgment and thought content normal. Cognition and memory are normal.        Assessment:     1. Coronary artery disease of native artery of native heart with stable angina pectoris    2. History of myocardial infarction    3. History of coronary artery stent placement    4. Essential hypertension    5. Type 2 diabetes mellitus with other circulatory complication, with long-term current use of insulin    6. Overweight        Plan:      1. Coronary Artery Disease              3/11/2020: Began experience occasional pain in jaw.              3/18/2020: Jaw and chest pain for 60 minutes. NSTEMI. Troponin 2.2.              3/19/2020: Echo: Normal left ventricular size and overall systolic function. Basal inferior hypokinesia. EF 60%. Moderate diastolic dysfunction. Mildly dilated LA.              3/20/2020: [facility]: Cath: LM: Distal 30%. LAD: Osteal  80%. Mid 70%. LCX: Mid 80%. RCA: Prox 70%. Mid 80%. RCA: VIJAYA 2.5 x 15 mm & 2.5 x 15 mm. LCX: VIJAYA 2.25 x 15 mm. LAD: VIJAYA 2.5 x 12 mm. 2.25 x 18 mm.               3/21/2020: Reviewed findings and showed angiogram for patient and discussed implications in detail.              3/19/2020: Chol 171. HDL 58. . TG 59.              On atorvastatin 40 mg Q24.              On aspirin 81 mg Q24.              On clopidogrel 75 mg Q24 for 12 months to 4/1/2021.              On NTG 0.4 mg sl PRN.    9/2020: Recurrence of exertional angina.   Set up cath.     2. Hypertension   2020: Diagnosed.   On lisinopril 5 mg Q24.   Encouraged to check at home.      3. Diabetes Mellitus, Type 2              2006: Diagnosed. Complications: CAD. Medications: On insulin.              3/19/2020: HgbA1C 13.2%.              Needs excellent control to reduce future risk for more cardiovascular events.   On good diet.     4. Overweight              3/19/2020: Weight 70 kg. BMI 27.     4. Primary Care              [facility].    The planned procedure was discussed in detail with the patient and the family members present. Risk, benefit and alternatives were reviewed. All questions answered. Consent was then signed.    If further questions or concerns arise the patient was encouraged to contact me prior to the planned procedure.      F/u 4 weeks.    Robyn Little M.D.

## 2020-10-22 NOTE — H&P (VIEW-ONLY)
Subjective:     Jamee Purvis is a 65 y.o. female with hypertension and diabetes mellitus, type 2. She is overweight. Beginning about 3/11/2020 she began feeling jaw pain on and off with mild SOB. She thought she had a sinus issues and was seen in the ER. He occasional jaw pain persisted. In the evening of 3/18/2020 the jaw pain was followed by moderate chest tightness. She became concerned and presented to the ER. She was admitted. On 3/20/2020 she underwent coronary angiography that revealed severe 3V CAD. She underwent 3 vessel stenting with 5 VIJAYA inserted. She became free from angina. In 9/2020 she began experience exertional chest pressure with discomfort radiating to the her jaw. She has not had any chest pain at rest. She denies exertionaldyspnea. No palpitations or weak spells. No bleeding. Feeling fair overall.       Coronary Artery Disease  Presents for follow-up visit. Symptoms include chest pain. Pertinent negatives include no chest pressure, chest tightness, dizziness, leg swelling, muscle weakness, palpitations, shortness of breath or weight gain. Risk factors include hyperlipidemia. Risk factors do not include obesity. The symptoms have been stable.   Hypertension  This is a new problem. The current episode started more than 1 month ago. The problem is unchanged. The problem is controlled (usaully 120-130/70-80 mmHg at home). Associated symptoms include chest pain. Pertinent negatives include no anxiety, blurred vision, headaches, malaise/fatigue, neck pain, orthopnea, palpitations, peripheral edema, PND, shortness of breath or sweats. There is no history of chronic renal disease.   Hyperlipidemia  This is a chronic problem. She has no history of chronic renal disease, diabetes, hypothyroidism, liver disease, obesity or nephrotic syndrome. Associated symptoms include chest pain. Pertinent negatives include no focal sensory loss, focal weakness, leg pain, myalgias or shortness of breath.        Review of Systems   Constitution: Negative for chills, fever, malaise/fatigue and weight gain.   HENT: Negative for nosebleeds.    Eyes: Negative for blurred vision, double vision, vision loss in left eye and vision loss in right eye.   Cardiovascular: Positive for chest pain. Negative for claudication, dyspnea on exertion, irregular heartbeat, leg swelling, near-syncope, orthopnea, palpitations, paroxysmal nocturnal dyspnea and syncope.   Respiratory: Negative for chest tightness, cough, hemoptysis, shortness of breath and wheezing.    Endocrine: Negative for cold intolerance and heat intolerance.   Hematologic/Lymphatic: Negative for bleeding problem. Does not bruise/bleed easily.   Skin: Negative for color change and rash.   Musculoskeletal: Negative for back pain, falls, muscle weakness, myalgias and neck pain.   Gastrointestinal: Negative for heartburn, hematemesis, hematochezia, hemorrhoids, jaundice, melena, nausea and vomiting.   Genitourinary: Negative for dysuria, hematuria and menorrhagia.   Neurological: Positive for tremors. Negative for dizziness, focal weakness, headaches, light-headedness, loss of balance, numbness, vertigo and weakness.   Psychiatric/Behavioral: Negative for altered mental status, depression and memory loss. The patient is not nervous/anxious.    Allergic/Immunologic: Negative for hives and persistent infections.       Current Outpatient Medications on File Prior to Visit   Medication Sig Dispense Refill    aspirin (ECOTRIN) 81 MG EC tablet Take 1 tablet (81 mg total) by mouth once daily. 90 tablet 3    atorvastatin (LIPITOR) 40 MG tablet Take 1 tablet (40 mg total) by mouth once daily. 90 tablet 3    blood sugar diagnostic Strp 1 each by Misc.(Non-Drug; Combo Route) route 3 (three) times daily. 100 each 3    blood-glucose meter Misc USE AS DIRECTED 1 each 0    clopidogreL (PLAVIX) 75 mg tablet Take 1 tablet (75 mg total) by mouth once daily. 90 tablet 3    insulin  "glargine 100 units/mL (3mL) SubQ pen Inject 15 Units into the skin once daily. 15 mL 0    lancets 30 gauge Misc TEST AS DIRECTED  3 (three) times daily. 100 each 0    lisinopriL (PRINIVIL,ZESTRIL) 5 MG tablet Take 1 tablet (5 mg total) by mouth once daily. Need to establish care with provider for more refills. 90 tablet 3    nitroGLYCERIN (NITROSTAT) 0.4 MG SL tablet DISSOLVE 1 TABLET UNDER THE TONGUE EVERY 5 MINUTES AS NEEDED FOR CHEST PAIN. NO MORE THAN 3 DOSES IN 24 HOURS 25 tablet 3    pen needle, diabetic (BD ULTRA-FINE MECHE PEN NEEDLE) 32 gauge x 5/32" Ndle Use once daily. 100 each 0    cetirizine (ZYRTEC) 10 MG tablet Take 1 tablet (10 mg total) by mouth once daily. (Patient not taking: Reported on 6/25/2020) 30 tablet 0    fluticasone propionate (FLONASE) 50 mcg/actuation nasal spray 1 spray (50 mcg total) by Each Nostril route 2 (two) times daily as needed for Rhinitis. (Patient not taking: Reported on 6/25/2020) 15 g 0     No current facility-administered medications on file prior to visit.        BP (!) 141/75 (BP Location: Left arm, Patient Position: Sitting, BP Method: Large (Automatic))   Pulse 62   Ht 5' 4" (1.626 m)   Wt 70.5 kg (155 lb 6.8 oz)   BMI 26.68 kg/m²       Objective:     Physical Exam   Constitutional: She is oriented to person, place, and time. She appears well-developed and well-nourished.  Non-toxic appearance. No distress.   HENT:   Head: Normocephalic and atraumatic.   Nose: Nose normal.   Eyes: Right eye exhibits no discharge. Left eye exhibits no discharge. Right conjunctiva is not injected. Left conjunctiva is not injected. Right pupil is round. Left pupil is round. Pupils are equal.   Neck: Neck supple. No JVD present. Carotid bruit is not present. No thyromegaly present.   Cardiovascular: Normal rate, regular rhythm, S1 normal and S2 normal.  No extrasystoles are present. PMI is not displaced. Exam reveals gallop and S4. Exam reveals no S3.   Pulses:       Radial " pulses are 2+ on the right side and 2+ on the left side.        Femoral pulses are 2+ on the right side and 2+ on the left side.       Dorsalis pedis pulses are 2+ on the right side and 2+ on the left side.        Posterior tibial pulses are 2+ on the right side and 2+ on the left side.   Pulmonary/Chest: Effort normal and breath sounds normal.   Abdominal: Soft. Normal appearance. There is no hepatosplenomegaly. There is no abdominal tenderness.   Musculoskeletal:      Right ankle: She exhibits no swelling, no ecchymosis and no deformity.      Left ankle: She exhibits no swelling, no ecchymosis and no deformity.   Lymphadenopathy:        Head (right side): No submandibular adenopathy present.        Head (left side): No submandibular adenopathy present.     She has no cervical adenopathy.   Neurological: She is alert and oriented to person, place, and time. She is not disoriented. No cranial nerve deficit.   Skin: Skin is warm, dry and intact. No rash noted. She is not diaphoretic.   Psychiatric: She has a normal mood and affect. Her speech is normal and behavior is normal. Judgment and thought content normal. Cognition and memory are normal.        Assessment:     1. Coronary artery disease of native artery of native heart with stable angina pectoris    2. History of myocardial infarction    3. History of coronary artery stent placement    4. Essential hypertension    5. Type 2 diabetes mellitus with other circulatory complication, with long-term current use of insulin    6. Overweight        Plan:      1. Coronary Artery Disease              3/11/2020: Began experience occasional pain in jaw.              3/18/2020: Jaw and chest pain for 60 minutes. NSTEMI. Troponin 2.2.              3/19/2020: Echo: Normal left ventricular size and overall systolic function. Basal inferior hypokinesia. EF 60%. Moderate diastolic dysfunction. Mildly dilated LA.              3/20/2020: [facility]: Cath: LM: Distal 30%. LAD: Osteal  80%. Mid 70%. LCX: Mid 80%. RCA: Prox 70%. Mid 80%. RCA: VIJAYA 2.5 x 15 mm & 2.5 x 15 mm. LCX: VIJAYA 2.25 x 15 mm. LAD: VIJAYA 2.5 x 12 mm. 2.25 x 18 mm.               3/21/2020: Reviewed findings and showed angiogram for patient and discussed implications in detail.              3/19/2020: Chol 171. HDL 58. . TG 59.              On atorvastatin 40 mg Q24.              On aspirin 81 mg Q24.              On clopidogrel 75 mg Q24 for 12 months to 4/1/2021.              On NTG 0.4 mg sl PRN.    9/2020: Recurrence of exertional angina.   Set up cath.     2. Hypertension   2020: Diagnosed.   On lisinopril 5 mg Q24.   Encouraged to check at home.      3. Diabetes Mellitus, Type 2              2006: Diagnosed. Complications: CAD. Medications: On insulin.              3/19/2020: HgbA1C 13.2%.              Needs excellent control to reduce future risk for more cardiovascular events.   On good diet.     4. Overweight              3/19/2020: Weight 70 kg. BMI 27.     4. Primary Care              [facility].    The planned procedure was discussed in detail with the patient and the family members present. Risk, benefit and alternatives were reviewed. All questions answered. Consent was then signed.    If further questions or concerns arise the patient was encouraged to contact me prior to the planned procedure.      F/u 4 weeks.    Robyn Little M.D.

## 2020-10-23 ENCOUNTER — HOSPITAL ENCOUNTER (OUTPATIENT)
Dept: PREADMISSION TESTING | Facility: OTHER | Age: 65
Discharge: HOME | End: 2020-10-23
Attending: INTERNAL MEDICINE
Payer: MEDICAID

## 2020-10-23 VITALS
SYSTOLIC BLOOD PRESSURE: 159 MMHG | WEIGHT: 155 LBS | DIASTOLIC BLOOD PRESSURE: 73 MMHG | TEMPERATURE: 97 F | HEART RATE: 55 BPM | BODY MASS INDEX: 26.46 KG/M2 | HEIGHT: 64 IN | OXYGEN SATURATION: 99 %

## 2020-10-23 LAB
ANION GAP SERPL CALC-SCNC: 11 MMOL/L (ref 8–16)
BUN SERPL-MCNC: 14 MG/DL (ref 8–23)
CALCIUM SERPL-MCNC: 10.5 MG/DL (ref 8.7–10.5)
CHLORIDE SERPL-SCNC: 102 MMOL/L (ref 95–110)
CO2 SERPL-SCNC: 25 MMOL/L (ref 23–29)
CREAT SERPL-MCNC: 0.8 MG/DL (ref 0.5–1.4)
ERYTHROCYTE [DISTWIDTH] IN BLOOD BY AUTOMATED COUNT: 13.4 % (ref 11.5–14.5)
EST. GFR  (AFRICAN AMERICAN): >60 ML/MIN/1.73 M^2
EST. GFR  (NON AFRICAN AMERICAN): >60 ML/MIN/1.73 M^2
GLUCOSE SERPL-MCNC: 112 MG/DL (ref 70–110)
HCT VFR BLD AUTO: 42.8 % (ref 37–48.5)
HGB BLD-MCNC: 13.5 G/DL (ref 12–16)
MCH RBC QN AUTO: 26.4 PG (ref 27–31)
MCHC RBC AUTO-ENTMCNC: 31.5 G/DL (ref 32–36)
MCV RBC AUTO: 84 FL (ref 82–98)
PLATELET # BLD AUTO: 167 K/UL (ref 150–350)
PMV BLD AUTO: 11.6 FL (ref 9.2–12.9)
POTASSIUM SERPL-SCNC: 4.8 MMOL/L (ref 3.5–5.1)
RBC # BLD AUTO: 5.11 M/UL (ref 4–5.4)
SODIUM SERPL-SCNC: 138 MMOL/L (ref 136–145)
WBC # BLD AUTO: 7.25 K/UL (ref 3.9–12.7)

## 2020-10-23 PROCEDURE — 85027 COMPLETE CBC AUTOMATED: CPT

## 2020-10-23 PROCEDURE — 36415 COLL VENOUS BLD VENIPUNCTURE: CPT

## 2020-10-23 PROCEDURE — 80048 BASIC METABOLIC PNL TOTAL CA: CPT

## 2020-10-23 NOTE — DISCHARGE INSTRUCTIONS
Information to Prepare you for your Surgery    PRE-ADMIT TESTING -  [phone]    [address]  [place] [facility]          Your surgery has been scheduled at [facility]. We are pleased to have the opportunity to serve you. For Further Information please call [phone].    On the day of surgery please report to the Information Desk on the 1st floor.    · CONTACT YOUR PHYSICIAN'S OFFICE THE DAY PRIOR TO YOUR SURGERY TO OBTAIN YOUR ARRIVAL TIME.     · The evening before surgery do not eat anything after 9 p.m. ( this includes hard candy, chewing gum and mints).  You may only have GATORADE, POWERADE AND WATER  from 9 p.m. until you leave your home.   DO NOT DRINK ANY LIQUIDS ON THE WAY TO THE HOSPITAL.      SPECIAL MEDICATION INSTRUCTIONS: TAKE medications checked off by the Anesthesiologist on your Medication List.    Angiogram Patients: Take medications as instructed by your physician, including aspirin.     Surgery Patients:    If you take ASPIRIN - Your PHYSICIAN/SURGEON will need to inform you IF/OR when you need to stop taking aspirin prior to your surgery.     Do Not take any medications containing IBUPROFEN.  Do Not Wear any make-up or dark nail polish   (especially eye make-up) to surgery. If you come to surgery with makeup on you will be required to remove the makeup or nail polish.    Do not shave your surgical area at least 5 days prior to your surgery. The surgical prep will be performed at the hospital according to Infection Control regulations.    Leave all valuables at home.   Do Not wear any jewelry or watches, including any metal in body piercings. Jewelry must be removed prior to coming to the hospital.  There is a possibility that rings that are unable to be removed may be cut off if they are on the surgical extremity.    Contact Lens must be removed before surgery. Either do not wear the contact lens or bring a case and solution for  storage.  Please bring a container for eyeglasses or dentures as required.  Bring any paperwork your physician has provided, such as consent forms,  history and physicals, doctor's orders, etc.   Bring comfortable clothes that are loose fitting to wear upon discharge. Take into consideration the type of surgery being performed.  Maintain your diet as advised per your physician the day prior to surgery.      Adequate rest the night before surgery is advised.   Park in the Parking lot behind the hospital or in the [place] [facility] across the street from the parking lot. Parking is complimentary.  If you will be discharged the same day as your procedure, please arrange for a responsible adult to drive you home or to accompany you if traveling by taxi.   YOU WILL NOT BE PERMITTED TO DRIVE OR TO LEAVE THE HOSPITAL ALONE AFTER SURGERY.   If you are being discharged the same day, it is strongly recommended that you arrange for someone to remain with you for the first 24 hrs following your surgery.    The Surgeon will speak to your family/visitor after your surgery regarding the outcome of your surgery and post op care.  The Surgeon may speak to you after your surgery, but there is a possibility you may not remember the details.  Please check with your family members regarding the conversation with the Surgeon.    We strongly recommend whoever is bringing you home be present for discharge instructions.  This will ensure a thorough understanding for your post op home care.    ALL CHILDREN MUST ALWAYS BE ACCOMPANIED BY AN ADULT.    Visitors-Refer to current Visitor policy handouts.    Thank you for your cooperation.  The Staff of [facility].                Bathing Instructions with Hibiclens     Shower the evening before and morning of your procedure with Hibiclens:   Wash your face with water and your regular face wash/soap   Apply Hibiclens directly on your skin or on a wet washcloth and wash  gently. When showering: Move away from the shower stream when applying Hibiclens to avoid rinsing off too soon.   Rinse thoroughly with warm water   Do not dilute Hibiclens         Dry off as usual, do not use any deodorant, powder, body lotions, perfume, after shave or cologne.

## 2020-10-24 ENCOUNTER — LAB VISIT (OUTPATIENT)
Dept: SPORTS MEDICINE | Facility: CLINIC | Age: 65
End: 2020-10-24
Payer: MEDICAID

## 2020-10-24 DIAGNOSIS — I25.2 HISTORY OF MYOCARDIAL INFARCTION: ICD-10-CM

## 2020-10-24 DIAGNOSIS — Z95.5 HISTORY OF CORONARY ARTERY STENT PLACEMENT: ICD-10-CM

## 2020-10-24 DIAGNOSIS — I25.118 CORONARY ARTERY DISEASE OF NATIVE ARTERY OF NATIVE HEART WITH STABLE ANGINA PECTORIS: ICD-10-CM

## 2020-10-24 LAB — SARS-COV-2 RNA RESP QL NAA+PROBE: NOT DETECTED

## 2020-10-24 PROCEDURE — U0003 INFECTIOUS AGENT DETECTION BY NUCLEIC ACID (DNA OR RNA); SEVERE ACUTE RESPIRATORY SYNDROME CORONAVIRUS 2 (SARS-COV-2) (CORONAVIRUS DISEASE [COVID-19]), AMPLIFIED PROBE TECHNIQUE, MAKING USE OF HIGH THROUGHPUT TECHNOLOGIES AS DESCRIBED BY CMS-2020-01-R: HCPCS

## 2020-10-26 ENCOUNTER — TELEPHONE (OUTPATIENT)
Dept: CARDIOLOGY | Facility: CLINIC | Age: 65
End: 2020-10-26

## 2020-10-26 NOTE — TELEPHONE ENCOUNTER
Reached out to patient. Calling to her Corvid test result, told her that her test was negative. Patient verbalized understanding.      ----- Message from Gabbi Guthrie sent at 10/26/2020 11:55 AM CDT -----  Contact: REZA PEREA [[id number]]  Type: Patient Call Back Who called:REZA PEREA [[id number]] What is the request in detail:Patient would like a call back but no additional information was given. She believed she received a call from this office in regards to her procedure tomorrow. Can the clinic reply by [facility]?no Would the patient rather a call back or a response via [facility]? Call back Best call back number:[phone] (mobile)   Additional Information:

## 2020-10-27 ENCOUNTER — HOSPITAL ENCOUNTER (INPATIENT)
Facility: OTHER | Age: 65
LOS: 4 days | Discharge: HOME | DRG: 246 | End: 2020-10-31
Attending: INTERNAL MEDICINE | Admitting: INTERNAL MEDICINE
Payer: MEDICAID

## 2020-10-27 DIAGNOSIS — Z79.4 TYPE 2 DIABETES MELLITUS WITH OTHER SPECIFIED COMPLICATION, WITH LONG-TERM CURRENT USE OF INSULIN: ICD-10-CM

## 2020-10-27 DIAGNOSIS — I25.118 CORONARY ARTERY DISEASE OF NATIVE ARTERY OF NATIVE HEART WITH STABLE ANGINA PECTORIS: ICD-10-CM

## 2020-10-27 DIAGNOSIS — E11.69 TYPE 2 DIABETES MELLITUS WITH OTHER SPECIFIED COMPLICATION, WITH LONG-TERM CURRENT USE OF INSULIN: ICD-10-CM

## 2020-10-27 DIAGNOSIS — I20.89 EXERTIONAL ANGINA: ICD-10-CM

## 2020-10-27 DIAGNOSIS — I25.10 CAD (CORONARY ARTERY DISEASE): ICD-10-CM

## 2020-10-27 DIAGNOSIS — I25.10 CORONARY ARTERY DISEASE: ICD-10-CM

## 2020-10-27 DIAGNOSIS — I24.9 ACS (ACUTE CORONARY SYNDROME): Primary | ICD-10-CM

## 2020-10-27 DIAGNOSIS — R07.9 CHEST PAIN: ICD-10-CM

## 2020-10-27 LAB
POCT GLUCOSE: 121 MG/DL (ref 70–110)
POCT GLUCOSE: 139 MG/DL (ref 70–110)
POCT GLUCOSE: 164 MG/DL (ref 70–110)
POCT GLUCOSE: 84 MG/DL (ref 70–110)

## 2020-10-27 PROCEDURE — C1769 GUIDE WIRE: HCPCS | Performed by: INTERNAL MEDICINE

## 2020-10-27 PROCEDURE — 93458 L HRT ARTERY/VENTRICLE ANGIO: CPT | Mod: 26,,, | Performed by: INTERNAL MEDICINE

## 2020-10-27 PROCEDURE — 93458 PR CATH PLACE/CORON ANGIO, IMG SUPER/INTERP,W LEFT HEART VENTRICULOGRAPHY: ICD-10-PCS | Mod: 26,,, | Performed by: INTERNAL MEDICINE

## 2020-10-27 PROCEDURE — 25000003 PHARM REV CODE 250: Performed by: INTERNAL MEDICINE

## 2020-10-27 PROCEDURE — C1894 INTRO/SHEATH, NON-LASER: HCPCS | Performed by: INTERNAL MEDICINE

## 2020-10-27 PROCEDURE — 20600001 HC STEP DOWN PRIVATE ROOM

## 2020-10-27 PROCEDURE — C9399 UNCLASSIFIED DRUGS OR BIOLOG: HCPCS | Performed by: STUDENT IN AN ORGANIZED HEALTH CARE EDUCATION/TRAINING PROGRAM

## 2020-10-27 PROCEDURE — 63600175 PHARM REV CODE 636 W HCPCS: Performed by: INTERNAL MEDICINE

## 2020-10-27 PROCEDURE — 25000003 PHARM REV CODE 250: Performed by: STUDENT IN AN ORGANIZED HEALTH CARE EDUCATION/TRAINING PROGRAM

## 2020-10-27 PROCEDURE — 25500020 PHARM REV CODE 255: Performed by: INTERNAL MEDICINE

## 2020-10-27 PROCEDURE — 93458 L HRT ARTERY/VENTRICLE ANGIO: CPT | Performed by: INTERNAL MEDICINE

## 2020-10-27 PROCEDURE — 82962 GLUCOSE BLOOD TEST: CPT | Performed by: INTERNAL MEDICINE

## 2020-10-27 RX ORDER — MIDAZOLAM HYDROCHLORIDE 1 MG/ML
INJECTION, SOLUTION INTRAMUSCULAR; INTRAVENOUS
Status: DISCONTINUED | OUTPATIENT
Start: 2020-10-27 | End: 2020-10-27 | Stop reason: HOSPADM

## 2020-10-27 RX ORDER — HYDROCODONE BITARTRATE AND ACETAMINOPHEN 5; 325 MG/1; MG/1
1 TABLET ORAL EVERY 4 HOURS PRN
Status: DISCONTINUED | OUTPATIENT
Start: 2020-10-27 | End: 2020-10-31 | Stop reason: HOSPADM

## 2020-10-27 RX ORDER — SODIUM CHLORIDE 0.9 % (FLUSH) 0.9 %
10 SYRINGE (ML) INJECTION
Status: DISCONTINUED | OUTPATIENT
Start: 2020-10-28 | End: 2020-10-31 | Stop reason: HOSPADM

## 2020-10-27 RX ORDER — SODIUM CHLORIDE 9 MG/ML
3 INJECTION, SOLUTION INTRAVENOUS CONTINUOUS
Status: ACTIVE | OUTPATIENT
Start: 2020-10-27 | End: 2020-10-27

## 2020-10-27 RX ORDER — ONDANSETRON 2 MG/ML
4 INJECTION INTRAMUSCULAR; INTRAVENOUS EVERY 8 HOURS PRN
Status: DISCONTINUED | OUTPATIENT
Start: 2020-10-28 | End: 2020-10-31 | Stop reason: HOSPADM

## 2020-10-27 RX ORDER — LIDOCAINE HYDROCHLORIDE 10 MG/ML
INJECTION INFILTRATION; PERINEURAL
Status: DISCONTINUED | OUTPATIENT
Start: 2020-10-27 | End: 2020-10-27 | Stop reason: HOSPADM

## 2020-10-27 RX ORDER — ATROPINE SULFATE 0.1 MG/ML
0.5 INJECTION INTRAVENOUS
Status: DISCONTINUED | OUTPATIENT
Start: 2020-10-27 | End: 2020-10-28

## 2020-10-27 RX ORDER — SODIUM CHLORIDE 9 MG/ML
INJECTION, SOLUTION INTRAVENOUS CONTINUOUS
Status: ACTIVE | OUTPATIENT
Start: 2020-10-27 | End: 2020-10-27

## 2020-10-27 RX ORDER — DIPHENHYDRAMINE HCL 25 MG
25 CAPSULE ORAL
Status: DISCONTINUED | OUTPATIENT
Start: 2020-10-27 | End: 2020-10-27 | Stop reason: HOSPADM

## 2020-10-27 RX ORDER — TALC
6 POWDER (GRAM) TOPICAL NIGHTLY PRN
Status: DISCONTINUED | OUTPATIENT
Start: 2020-10-28 | End: 2020-10-31 | Stop reason: HOSPADM

## 2020-10-27 RX ORDER — ACETAMINOPHEN 325 MG/1
650 TABLET ORAL EVERY 4 HOURS PRN
Status: DISCONTINUED | OUTPATIENT
Start: 2020-10-28 | End: 2020-10-31 | Stop reason: HOSPADM

## 2020-10-27 RX ORDER — NITROGLYCERIN 80 MG/1
1 PATCH TRANSDERMAL DAILY
Status: DISCONTINUED | OUTPATIENT
Start: 2020-10-27 | End: 2020-10-28

## 2020-10-27 RX ORDER — ACETAMINOPHEN 325 MG/1
650 TABLET ORAL EVERY 4 HOURS PRN
Status: DISCONTINUED | OUTPATIENT
Start: 2020-10-27 | End: 2020-10-28

## 2020-10-27 RX ORDER — FENTANYL CITRATE 50 UG/ML
INJECTION, SOLUTION INTRAMUSCULAR; INTRAVENOUS
Status: DISCONTINUED | OUTPATIENT
Start: 2020-10-27 | End: 2020-10-27 | Stop reason: HOSPADM

## 2020-10-27 RX ORDER — SODIUM CHLORIDE 9 MG/ML
INJECTION, SOLUTION INTRAVENOUS CONTINUOUS
Status: DISCONTINUED | OUTPATIENT
Start: 2020-10-27 | End: 2020-10-27

## 2020-10-27 RX ORDER — POLYETHYLENE GLYCOL 3350 17 G/17G
17 POWDER, FOR SOLUTION ORAL DAILY PRN
Status: DISCONTINUED | OUTPATIENT
Start: 2020-10-28 | End: 2020-10-28

## 2020-10-27 RX ORDER — HEPARIN SOD,PORCINE/0.9 % NACL 1000/500ML
INTRAVENOUS SOLUTION INTRAVENOUS
Status: DISCONTINUED | OUTPATIENT
Start: 2020-10-27 | End: 2020-10-27 | Stop reason: HOSPADM

## 2020-10-27 RX ORDER — NITROGLYCERIN 0.4 MG/1
0.4 TABLET SUBLINGUAL EVERY 5 MIN PRN
Status: DISCONTINUED | OUTPATIENT
Start: 2020-10-27 | End: 2020-10-31 | Stop reason: HOSPADM

## 2020-10-27 RX ADMIN — HYDROCODONE BITARTRATE AND ACETAMINOPHEN 1 TABLET: 5; 325 TABLET ORAL at 01:10

## 2020-10-27 RX ADMIN — DIPHENHYDRAMINE HYDROCHLORIDE 25 MG: 25 CAPSULE ORAL at 06:10

## 2020-10-27 RX ADMIN — SODIUM CHLORIDE: 0.9 INJECTION, SOLUTION INTRAVENOUS at 06:10

## 2020-10-27 RX ADMIN — INSULIN DETEMIR 11 UNITS: 100 INJECTION, SOLUTION SUBCUTANEOUS at 08:10

## 2020-10-27 NOTE — INTERVAL H&P NOTE
The patient has been examined and the H&P has been reviewed:    I concur with the findings and no changes have occurred since H&P was written.    Surgery risks, benefits and alternative options discussed and understood by patient/family.          Active Hospital Problems    Diagnosis  POA    CAD (coronary artery disease) [I25.10]  Yes      Resolved Hospital Problems   No resolved problems to display.

## 2020-10-27 NOTE — OR NURSING
Awake and alert, denies pain, right groin soft, ice pack in place, pedal pulses good.  To room with transport, report given to nurse.  Instructed to keep right leg straight and head flat.

## 2020-10-27 NOTE — PLAN OF CARE
Patient received from recovery/cath lab, ice to groin, scant drainage noted under transparent dressing. Remained flat until 1500. Oriented to room, daughter at bedside, urinated 2 x, required PRN medication x1 this shift. Report given to RN at main [place].Will continue to monitor for any needs until transport.

## 2020-10-27 NOTE — Clinical Note
Catheter is inserted into the left ventricle. Hemodynamics performed.  Pastoral Care Progress Note    2020  Patient: Khalida Ji : 1960  Admission Date & Time: 2020 1544  MRN: [id number] CSN: [id number]                     Chaplaincy Interventions Utilized:   Empowerment: Provided chaplaincy education            Relationship Building: Cultivated a relationship of care and support    Pt declined  support        Chaplaincy Outcomes Achieved:  Declined  support         20 1101   Spiritual Beliefs/Perceptions   Support Systems Spouse/significant other;Family members   Plan of  [facility] Initiated Patient/family refused  involvement   Assessment Completed by: Unit visit

## 2020-10-27 NOTE — Clinical Note
70 ml injected throughout the case. 180 mL total wasted during the case. 250 mL total used in the case.

## 2020-10-27 NOTE — Clinical Note
Percutaneous stick to the right groin  3 = unable to understand or speak (not related to language barrier)

## 2020-10-27 NOTE — BRIEF OP NOTE
10/27/2020: [facility]: Cath: LM: Distal 30%. LAD: Osteal: Stent 50%. Mid stent subtotal. D1: 90%. LCX: Osteal 50%. Mid stent 50%. Distal 80%. RCA: Prox and mid stents patent. AV segment 80%. LV: Anterolateral severe hypokinesia. EF 45%.                    Robyn Little M.D.

## 2020-10-27 NOTE — Clinical Note
100 ml injected throughout the case. 150 mL total wasted during the case. 250 mL total used in the case.

## 2020-10-27 NOTE — PLAN OF CARE
Plan of care discussed with patient. Patient is free of fall/trauma/injury. Denies CP, SOB, or pain/discomfort. Patient placed on Visi. Patient had heart cath done before arriving on unit. Pt is in need of bypass.Right groin site shows scant drainage as reported by [facility] nurse. No signs of hematoma. Patient slightly shaky when ambulating to the bathroom. Nitro patch scheduled for tomorrow AM.All questions addressed. Will continue to monitor

## 2020-10-28 LAB
ALBUMIN SERPL BCP-MCNC: 3.5 G/DL (ref 3.5–5.2)
ALP SERPL-CCNC: 80 U/L (ref 55–135)
ALT SERPL W/O P-5'-P-CCNC: 22 U/L (ref 10–44)
ANION GAP SERPL CALC-SCNC: 9 MMOL/L (ref 8–16)
ANION GAP SERPL CALC-SCNC: 9 MMOL/L (ref 8–16)
ASCENDING AORTA: 2.43 CM
AST SERPL-CCNC: 17 U/L (ref 10–40)
AV INDEX (PROSTH): 0.62
AV MEAN GRADIENT: 6 MMHG
AV PEAK GRADIENT: 12 MMHG
AV VALVE AREA: 1.76 CM2
AV VELOCITY RATIO: 0.65
BASOPHILS # BLD AUTO: 0.04 K/UL (ref 0–0.2)
BASOPHILS # BLD AUTO: 0.04 K/UL (ref 0–0.2)
BASOPHILS NFR BLD: 0.5 % (ref 0–1.9)
BASOPHILS NFR BLD: 0.5 % (ref 0–1.9)
BILIRUB SERPL-MCNC: 0.3 MG/DL (ref 0.1–1)
BNP SERPL-MCNC: 25 PG/ML (ref 0–99)
BSA FOR ECHO PROCEDURE: 1.78 M2
BUN SERPL-MCNC: 14 MG/DL (ref 8–23)
BUN SERPL-MCNC: 14 MG/DL (ref 8–23)
CALCIUM SERPL-MCNC: 9.6 MG/DL (ref 8.7–10.5)
CALCIUM SERPL-MCNC: 9.6 MG/DL (ref 8.7–10.5)
CHLORIDE SERPL-SCNC: 105 MMOL/L (ref 95–110)
CHLORIDE SERPL-SCNC: 105 MMOL/L (ref 95–110)
CO2 SERPL-SCNC: 26 MMOL/L (ref 23–29)
CO2 SERPL-SCNC: 26 MMOL/L (ref 23–29)
CREAT SERPL-MCNC: 0.8 MG/DL (ref 0.5–1.4)
CREAT SERPL-MCNC: 0.8 MG/DL (ref 0.5–1.4)
CV ECHO LV RWT: 0.51 CM
DIFFERENTIAL METHOD: ABNORMAL
DIFFERENTIAL METHOD: ABNORMAL
DOP CALC AO PEAK VEL: 1.73 M/S
DOP CALC AO VTI: 41.55 CM
DOP CALC LVOT AREA: 2.8 CM2
DOP CALC LVOT DIAMETER: 1.9 CM
DOP CALC LVOT PEAK VEL: 1.12 M/S
DOP CALC LVOT STROKE VOLUME: 73.17 CM3
DOP CALCLVOT PEAK VEL VTI: 25.82 CM
E WAVE DECELERATION TIME: 201.95 MSEC
E/A RATIO: 1.29
E/E' RATIO: 11.25 M/S
ECHO LV POSTERIOR WALL: 1.05 CM (ref 0.6–1.1)
EOSINOPHIL # BLD AUTO: 0.2 K/UL (ref 0–0.5)
EOSINOPHIL # BLD AUTO: 0.2 K/UL (ref 0–0.5)
EOSINOPHIL NFR BLD: 2.7 % (ref 0–8)
EOSINOPHIL NFR BLD: 2.7 % (ref 0–8)
ERYTHROCYTE [DISTWIDTH] IN BLOOD BY AUTOMATED COUNT: 13.3 % (ref 11.5–14.5)
ERYTHROCYTE [DISTWIDTH] IN BLOOD BY AUTOMATED COUNT: 13.3 % (ref 11.5–14.5)
EST. GFR  (AFRICAN AMERICAN): >60 ML/MIN/1.73 M^2
EST. GFR  (AFRICAN AMERICAN): >60 ML/MIN/1.73 M^2
EST. GFR  (NON AFRICAN AMERICAN): >60 ML/MIN/1.73 M^2
EST. GFR  (NON AFRICAN AMERICAN): >60 ML/MIN/1.73 M^2
ESTIMATED AVG GLUCOSE: 212 MG/DL (ref 68–131)
FRACTIONAL SHORTENING: 30 % (ref 28–44)
GLUCOSE SERPL-MCNC: 161 MG/DL (ref 70–110)
GLUCOSE SERPL-MCNC: 161 MG/DL (ref 70–110)
HBA1C MFR BLD HPLC: 9 % (ref 4–5.6)
HCT VFR BLD AUTO: 39.3 % (ref 37–48.5)
HCT VFR BLD AUTO: 39.3 % (ref 37–48.5)
HGB BLD-MCNC: 12.4 G/DL (ref 12–16)
HGB BLD-MCNC: 12.4 G/DL (ref 12–16)
IMM GRANULOCYTES # BLD AUTO: 0.02 K/UL (ref 0–0.04)
IMM GRANULOCYTES # BLD AUTO: 0.02 K/UL (ref 0–0.04)
IMM GRANULOCYTES NFR BLD AUTO: 0.3 % (ref 0–0.5)
IMM GRANULOCYTES NFR BLD AUTO: 0.3 % (ref 0–0.5)
INTERVENTRICULAR SEPTUM: 1.03 CM (ref 0.6–1.1)
LA MAJOR: 4.21 CM
LA MINOR: 4.16 CM
LA WIDTH: 3.36 CM
LEFT ATRIUM SIZE: 3.98 CM
LEFT ATRIUM VOLUME INDEX: 27.2 ML/M2
LEFT ATRIUM VOLUME: 47.57 CM3
LEFT INTERNAL DIMENSION IN SYSTOLE: 2.9 CM (ref 2.1–4)
LEFT VENTRICLE DIASTOLIC VOLUME INDEX: 43.36 ML/M2
LEFT VENTRICLE DIASTOLIC VOLUME: 75.89 ML
LEFT VENTRICLE MASS INDEX: 81 G/M2
LEFT VENTRICLE SYSTOLIC VOLUME INDEX: 18.5 ML/M2
LEFT VENTRICLE SYSTOLIC VOLUME: 32.32 ML
LEFT VENTRICULAR INTERNAL DIMENSION IN DIASTOLE: 4.14 CM (ref 3.5–6)
LEFT VENTRICULAR MASS: 141.78 G
LV LATERAL E/E' RATIO: 8.18 M/S
LV SEPTAL E/E' RATIO: 18 M/S
LYMPHOCYTES # BLD AUTO: 2.8 K/UL (ref 1–4.8)
LYMPHOCYTES # BLD AUTO: 2.8 K/UL (ref 1–4.8)
LYMPHOCYTES NFR BLD: 37.6 % (ref 18–48)
LYMPHOCYTES NFR BLD: 37.6 % (ref 18–48)
MAGNESIUM SERPL-MCNC: 1.6 MG/DL (ref 1.6–2.6)
MCH RBC QN AUTO: 26.6 PG (ref 27–31)
MCH RBC QN AUTO: 26.6 PG (ref 27–31)
MCHC RBC AUTO-ENTMCNC: 31.6 G/DL (ref 32–36)
MCHC RBC AUTO-ENTMCNC: 31.6 G/DL (ref 32–36)
MCV RBC AUTO: 84 FL (ref 82–98)
MCV RBC AUTO: 84 FL (ref 82–98)
MONOCYTES # BLD AUTO: 0.7 K/UL (ref 0.3–1)
MONOCYTES # BLD AUTO: 0.7 K/UL (ref 0.3–1)
MONOCYTES NFR BLD: 9.1 % (ref 4–15)
MONOCYTES NFR BLD: 9.1 % (ref 4–15)
MV PEAK A VEL: 0.7 M/S
MV PEAK E VEL: 0.9 M/S
MV STENOSIS PRESSURE HALF TIME: 58.57 MS
MV VALVE AREA P 1/2 METHOD: 3.76 CM2
NEUTROPHILS # BLD AUTO: 3.7 K/UL (ref 1.8–7.7)
NEUTROPHILS # BLD AUTO: 3.7 K/UL (ref 1.8–7.7)
NEUTROPHILS NFR BLD: 49.8 % (ref 38–73)
NEUTROPHILS NFR BLD: 49.8 % (ref 38–73)
NRBC BLD-RTO: 0 /100 WBC
NRBC BLD-RTO: 0 /100 WBC
PHOSPHATE SERPL-MCNC: 3.7 MG/DL (ref 2.7–4.5)
PISA TR MAX VEL: 2.49 M/S
PLATELET # BLD AUTO: 151 K/UL (ref 150–350)
PLATELET # BLD AUTO: 151 K/UL (ref 150–350)
PMV BLD AUTO: 12.2 FL (ref 9.2–12.9)
PMV BLD AUTO: 12.2 FL (ref 9.2–12.9)
POCT GLUCOSE: 203 MG/DL (ref 70–110)
POCT GLUCOSE: 214 MG/DL (ref 70–110)
POCT GLUCOSE: 221 MG/DL (ref 70–110)
POCT GLUCOSE: 239 MG/DL (ref 70–110)
POTASSIUM SERPL-SCNC: 4.3 MMOL/L (ref 3.5–5.1)
POTASSIUM SERPL-SCNC: 4.3 MMOL/L (ref 3.5–5.1)
PROT SERPL-MCNC: 7.7 G/DL (ref 6–8.4)
PULM VEIN S/D RATIO: 1.38
PV PEAK D VEL: 0.39 M/S
PV PEAK S VEL: 0.54 M/S
RA MAJOR: 4.03 CM
RA PRESSURE: 3 MMHG
RA WIDTH: 3.17 CM
RBC # BLD AUTO: 4.67 M/UL (ref 4–5.4)
RBC # BLD AUTO: 4.67 M/UL (ref 4–5.4)
RIGHT VENTRICULAR END-DIASTOLIC DIMENSION: 3.68 CM
RV TISSUE DOPPLER FREE WALL SYSTOLIC VELOCITY 1 (APICAL 4 CHAMBER VIEW): 13.46 CM/S
SINUS: 2.37 CM
SODIUM SERPL-SCNC: 140 MMOL/L (ref 136–145)
SODIUM SERPL-SCNC: 140 MMOL/L (ref 136–145)
STJ: 2.01 CM
TDI LATERAL: 0.11 M/S
TDI SEPTAL: 0.05 M/S
TDI: 0.08 M/S
TR MAX PG: 25 MMHG
TRICUSPID ANNULAR PLANE SYSTOLIC EXCURSION: 1.85 CM
TV REST PULMONARY ARTERY PRESSURE: 28 MMHG
WBC # BLD AUTO: 7.34 K/UL (ref 3.9–12.7)
WBC # BLD AUTO: 7.34 K/UL (ref 3.9–12.7)

## 2020-10-28 PROCEDURE — 20600001 HC STEP DOWN PRIVATE ROOM

## 2020-10-28 PROCEDURE — 93010 EKG 12-LEAD: ICD-10-PCS | Mod: ,,, | Performed by: INTERNAL MEDICINE

## 2020-10-28 PROCEDURE — 93005 ELECTROCARDIOGRAM TRACING: CPT

## 2020-10-28 PROCEDURE — 25000003 PHARM REV CODE 250: Performed by: STUDENT IN AN ORGANIZED HEALTH CARE EDUCATION/TRAINING PROGRAM

## 2020-10-28 PROCEDURE — 36415 COLL VENOUS BLD VENIPUNCTURE: CPT

## 2020-10-28 PROCEDURE — 97535 SELF CARE MNGMENT TRAINING: CPT

## 2020-10-28 PROCEDURE — 85025 COMPLETE CBC W/AUTO DIFF WBC: CPT

## 2020-10-28 PROCEDURE — 99233 SBSQ HOSP IP/OBS HIGH 50: CPT | Mod: ,,, | Performed by: NURSE PRACTITIONER

## 2020-10-28 PROCEDURE — 25000003 PHARM REV CODE 250: Performed by: INTERNAL MEDICINE

## 2020-10-28 PROCEDURE — 83735 ASSAY OF MAGNESIUM: CPT

## 2020-10-28 PROCEDURE — 80053 COMPREHEN METABOLIC PANEL: CPT

## 2020-10-28 PROCEDURE — 83036 HEMOGLOBIN GLYCOSYLATED A1C: CPT

## 2020-10-28 PROCEDURE — 84100 ASSAY OF PHOSPHORUS: CPT

## 2020-10-28 PROCEDURE — 93010 ELECTROCARDIOGRAM REPORT: CPT | Mod: ,,, | Performed by: INTERNAL MEDICINE

## 2020-10-28 PROCEDURE — 83880 ASSAY OF NATRIURETIC PEPTIDE: CPT

## 2020-10-28 PROCEDURE — 99223 PR INITIAL HOSPITAL CARE,LEVL III: ICD-10-PCS | Mod: ,,, | Performed by: INTERNAL MEDICINE

## 2020-10-28 PROCEDURE — 97161 PT EVAL LOW COMPLEX 20 MIN: CPT

## 2020-10-28 PROCEDURE — 99223 1ST HOSP IP/OBS HIGH 75: CPT | Mod: ,,, | Performed by: INTERNAL MEDICINE

## 2020-10-28 PROCEDURE — 97165 OT EVAL LOW COMPLEX 30 MIN: CPT

## 2020-10-28 PROCEDURE — 63600175 PHARM REV CODE 636 W HCPCS: Performed by: STUDENT IN AN ORGANIZED HEALTH CARE EDUCATION/TRAINING PROGRAM

## 2020-10-28 PROCEDURE — 97802 MEDICAL NUTRITION INDIV IN: CPT

## 2020-10-28 PROCEDURE — 99233 PR SUBSEQUENT HOSPITAL CARE,LEVL III: ICD-10-PCS | Mod: ,,, | Performed by: NURSE PRACTITIONER

## 2020-10-28 RX ORDER — CETIRIZINE HYDROCHLORIDE 10 MG/1
10 TABLET ORAL DAILY
Status: DISCONTINUED | OUTPATIENT
Start: 2020-10-28 | End: 2020-10-31 | Stop reason: HOSPADM

## 2020-10-28 RX ORDER — POLYETHYLENE GLYCOL 3350 17 G/17G
17 POWDER, FOR SOLUTION ORAL DAILY
Status: DISCONTINUED | OUTPATIENT
Start: 2020-10-28 | End: 2020-10-31 | Stop reason: HOSPADM

## 2020-10-28 RX ORDER — ISOSORBIDE MONONITRATE 30 MG/1
30 TABLET, EXTENDED RELEASE ORAL DAILY
Status: DISCONTINUED | OUTPATIENT
Start: 2020-10-28 | End: 2020-10-31 | Stop reason: HOSPADM

## 2020-10-28 RX ORDER — ATORVASTATIN CALCIUM 20 MG/1
40 TABLET, FILM COATED ORAL DAILY
Status: DISCONTINUED | OUTPATIENT
Start: 2020-10-28 | End: 2020-10-28

## 2020-10-28 RX ORDER — INSULIN ASPART 100 [IU]/ML
1-10 INJECTION, SOLUTION INTRAVENOUS; SUBCUTANEOUS EVERY 6 HOURS PRN
Status: DISCONTINUED | OUTPATIENT
Start: 2020-10-28 | End: 2020-10-29

## 2020-10-28 RX ORDER — ATORVASTATIN CALCIUM 20 MG/1
80 TABLET, FILM COATED ORAL DAILY
Status: DISCONTINUED | OUTPATIENT
Start: 2020-10-29 | End: 2020-10-31 | Stop reason: HOSPADM

## 2020-10-28 RX ORDER — MAGNESIUM SULFATE HEPTAHYDRATE 40 MG/ML
2 INJECTION, SOLUTION INTRAVENOUS ONCE
Status: COMPLETED | OUTPATIENT
Start: 2020-10-28 | End: 2020-10-28

## 2020-10-28 RX ORDER — GLUCAGON 1 MG
1 KIT INJECTION
Status: DISCONTINUED | OUTPATIENT
Start: 2020-10-28 | End: 2020-10-29

## 2020-10-28 RX ORDER — ENOXAPARIN SODIUM 100 MG/ML
40 INJECTION SUBCUTANEOUS EVERY 24 HOURS
Status: DISCONTINUED | OUTPATIENT
Start: 2020-10-28 | End: 2020-10-30

## 2020-10-28 RX ORDER — ASPIRIN 81 MG/1
81 TABLET ORAL DAILY
Status: DISCONTINUED | OUTPATIENT
Start: 2020-10-28 | End: 2020-10-31 | Stop reason: HOSPADM

## 2020-10-28 RX ORDER — LISINOPRIL 5 MG/1
5 TABLET ORAL DAILY
Status: DISCONTINUED | OUTPATIENT
Start: 2020-10-28 | End: 2020-10-31 | Stop reason: HOSPADM

## 2020-10-28 RX ADMIN — ASPIRIN 81 MG: 81 TABLET, COATED ORAL at 10:10

## 2020-10-28 RX ADMIN — TICAGRELOR 180 MG: 90 TABLET ORAL at 03:10

## 2020-10-28 RX ADMIN — ISOSORBIDE MONONITRATE 30 MG: 30 TABLET, EXTENDED RELEASE ORAL at 03:10

## 2020-10-28 RX ADMIN — ATORVASTATIN CALCIUM 40 MG: 20 TABLET, FILM COATED ORAL at 10:10

## 2020-10-28 RX ADMIN — INSULIN ASPART 4 UNITS: 100 INJECTION, SOLUTION INTRAVENOUS; SUBCUTANEOUS at 12:10

## 2020-10-28 RX ADMIN — CETIRIZINE HYDROCHLORIDE 10 MG: 10 TABLET, FILM COATED ORAL at 10:10

## 2020-10-28 RX ADMIN — MAGNESIUM SULFATE IN WATER 2 G: 40 INJECTION, SOLUTION INTRAVENOUS at 07:10

## 2020-10-28 RX ADMIN — INSULIN ASPART 2 UNITS: 100 INJECTION, SOLUTION INTRAVENOUS; SUBCUTANEOUS at 05:10

## 2020-10-28 RX ADMIN — POLYETHYLENE GLYCOL 3350 17 G: 17 POWDER, FOR SOLUTION ORAL at 09:10

## 2020-10-28 RX ADMIN — ACETAMINOPHEN 650 MG: 325 TABLET ORAL at 05:10

## 2020-10-28 RX ADMIN — ENOXAPARIN SODIUM 40 MG: 40 INJECTION SUBCUTANEOUS at 05:10

## 2020-10-28 RX ADMIN — ACETAMINOPHEN 650 MG: 325 TABLET ORAL at 11:10

## 2020-10-28 RX ADMIN — LISINOPRIL 5 MG: 5 TABLET ORAL at 10:10

## 2020-10-28 RX ADMIN — INSULIN ASPART 2 UNITS: 100 INJECTION, SOLUTION INTRAVENOUS; SUBCUTANEOUS at 08:10

## 2020-10-28 NOTE — PLAN OF CARE
10/28/20 0906   Discharge Assessment   Assessment Type Discharge Planning Assessment   Confirmed/corrected address and phone number on facesheet? Yes   Assessment information obtained from? Medical Record;Patient   Expected Length of Stay (days) 3   Prior to hospitilization cognitive status: Alert/Oriented   Prior to hospitalization functional status: Independent   Current cognitive status: Alert/Oriented   Current Functional Status: Independent   Facility Arrived From: [facility]   Lives With child(enrike), adult   Able to Return to Prior Arrangements yes   Is patient able to care for self after discharge? Unable to determine at this time (comments)   Patient's perception of discharge disposition home or selfcare   Readmission Within the Last 30 Days no previous admission in last 30 days   Patient currently being followed by outpatient case management? No   Patient currently receives any other outside agency services? No   Equipment Currently Used at Home none   Do you have any problems affording any of your prescribed medications? TBD   Is the patient taking medications as prescribed? yes   Does the patient have transportation home? Yes   Transportation Anticipated family or friend will provide  (daughter)   Does the patient receive services at the Coumadin Clinic? No   Discharge Plan A Home with family   DME Needed Upon Discharge  none   Patient/Family in Agreement with Plan yes   Transferred from [facility] with CAD for CTS eval. Lives with daughter and is independent in her ADLs. Plan is to DC home. No DC needs identified.

## 2020-10-28 NOTE — PROGRESS NOTES
Nutrition-Related Diabetes Education    Time Spent: 15 minutes  Learners: Pt    Current HbA1c: 9.0  Is patient aware of their A1c and their goal A1c? Yes  Home diabetes medication(s): insulin    Nutrition Education with handouts: Meal Planning Using the Plate Method    Comments: Reviewed CHO-containing foods, appropriate serving sizes, and healthy plate. Encouraged pt to consume more non-starchy vegetables and lean protein. Pt reports she has been trying to eat healthy and consume more vegetables recently.    Pt reports good appetite both currently and PTA and states she consumed most of her breakfast this AM. Pt reports some wt gain PTA. Stable wt 154-160# x 7 months per chart.    Barriers to Learning: None    Follow up: Yes  Please consult as needed.  Thank you!

## 2020-10-28 NOTE — NURSING
"Cardiology Team notified that pt is on unit and has not been seen yet. Team states, they were unaware of pt's arrival. Pt reports that her blood sugar may be high because she is "shaking". BG @ 168 when checked. Team will put orders in. WCTM.   "

## 2020-10-28 NOTE — ASSESSMENT & PLAN NOTE
Ms. Purvis is a 66 yo F with HTN, DM2 and known severe 3-V disease and mild LV systolic dysfunction who was transferred to [facility] for [place] surgery evaluation.    · Consult CT surgery for surgical candidacy  · Continue medical therapy ASA, statin

## 2020-10-28 NOTE — PT/OT/SLP EVAL
"Occupational Therapy   Evaluation    Name: Jamee Purvis  MRN: [id number]  Admitting Diagnosis:  Coronary artery disease     1 Day Post-Op  Pre-op Diagnosis: Coronary artery disease [I25.10]    Procedure(s):  HEART CATH-LEFT     Recommendations:     Discharge Recommendations: home health PT, home health OT  Discharge Equipment Recommendations:  (TBD)  Barriers to discharge:  None    Assessment:     Jamee Purvis is a 65 y.o. female with a medical diagnosis of Coronary artery disease. She presents as transfer from [facility] for surgical evaluation. Performance deficits affecting function: weakness, impaired endurance, impaired self care skills, gait instability, impaired functional mobilty, impaired balance, impaired cardiopulmonary response to activity. Patient agreeable to therapy evaluation and OOB activity. Initially patient required CGA for mobility within hallway; however, patient presented with full body shakes/tremors when sitting on toilet and required min assist to ambulate to bedside chair. Patient reported needing insulin and RN notified immediately. At this time, patient will continue to benefit from acute skilled therapy intervention to address deficits/underlying impairments and progress towards prior level of function. After discharge, patient will benefit from receiving home health therapy services to ensure safety and independence in the home environment.    Rehab Prognosis: Good; patient would benefit from acute skilled OT services to address these deficits and reach maximum level of function.       Plan:     Patient to be seen 3 x/week to address the above listed problems via self-care/home management, therapeutic exercises, therapeutic activities  · Plan of Care Expires: 11/28/20  · Plan of Care Reviewed with: patient    Subjective     Chief Complaint: none stated  Patient Comments: "I'm stiff from being in this bed"    Occupational Profile:  Living Environment: Patient lives with her " daughter in a SSH with 3 MAYO, no hand rails. Patient has a tub/shower combo.   Previous level of function: Independent with ADLs and functional mobility  Roles and Routines: Does not work; does not drive  Equipment Used at Home: none  Assistance upon Discharge: Daughter can assist    Pain/Comfort:  · Pain Rating 1: (Reported not pain at start of session but showed signs of discomfort when performing shoulder AROM)    Patients cultural, spiritual, Quaker conflicts given the current situation: no    Objective:     Communicated with: RN prior to session. Patient found HOB elevated with telemetry upon OT entry to room. PT present for co-evaluation.    General Precautions: Standard, fall   Orthopedic Precautions:N/A   Braces: N/A     Occupational Performance:    Bed Mobility:    · Patient completed Supine to Sit with supervision    Functional Mobility/Transfers:  · Patient completed Sit <> Stand Transfer with stand by assistance  with  no assistive device   · 1x from EOB, 1x from bedside chair  · Patient completed Toilet Transfer Step Transfer technique with contact guard assistance for stand > sit and min assist for sit > stand with  grab bars  · Functional Mobility:   · Patient ambulated in hallway with CGA with no AD  · Patient ambulated bedside chair > toilet with SBA with no AD  · Patient ambulated toilet > bedside chair with min assist with hand-held assist; patient began presenting with full body shakes/tremors while sitting on toilet and reported needing her medication. RN notified.    Activities of Daily Living:  · Grooming: contact guard assistance standing at sink for hand hygiene  · Lower Body Dressing: supervision adjusting socks sitting EOB  · Toileting: stand by assistance for hygiene    Cognitive/Visual Perceptual:  Cognitive/Psychosocial Skills:    -       Oriented to: Person, Place, Time and Situation   -       Follows Commands/attention:Follows multistep  commands  -       Communication:  clear/fluent  -       Memory: No Deficits noted  -       Safety awareness/insight to disability: intact   -       Mood/Affect/Coping skills/emotional control: Appropriate to situation, Cooperative and Pleasant    Physical Exam:  Dominant hand:    -       Right  Upper Extremity Range of Motion:     -       Right Upper Extremity: WFL; indicated discomfort in shoulders with AROM  -       Left Upper Extremity: WFL; indicated discomfort in shoulders with AROM  Upper Extremity Strength:    -       Right Upper Extremity: WFL  -       Left Upper Extremity: WFL   Strength:    -       Right Upper Extremity: WFL  -       Left Upper Extremity: WFL  Fine Motor Coordination:    -       Intact    AMPAC 6 Click ADL:  AMPAC Total Score: 20    Treatment & Education:   Therapist provided facilitation and instruction of proper body mechanics and fall prevention strategies during tasks listed above.   Instructed patient to sit in bedside chair daily to increase OOB/activity tolerance.   Instructed patient to use call light to have nursing staff assist with needs/transfers.   Discussed OT POC and answered all questions within OT scope of practice.   Whiteboard updated   Education:    Patient left up in chair with all lines intact, call button in reach and RN present    GOALS:   Multidisciplinary Problems     Occupational Therapy Goals        Problem: Occupational Therapy Goal    Goal Priority Disciplines Outcome Interventions   Occupational Therapy Goal     OT, PT/OT Ongoing, Progressing    Description: Goals to be met by: 11/11/2020    Patient will increase functional independence with ADLs by performing:    LE Dressing with Set-up Assistance.  Grooming while standing with Supervision.  Toileting from toilet with Set-up Assistance for hygiene and clothing management.   Bathing from standing at sink with Supervision.  Toilet transfer to toilet with Supervision.                     History:     Past Medical History:   Diagnosis  Date    Anticoagulant long-term use     plavix    Coronary artery disease 2020    stents x 5    Diabetes mellitus     Hypertension 2020    Overweight 3/20/2020     3/19/2020: Weight 70 kg. BMI 27.         Past Surgical History:   Procedure Laterality Date    BREAST SURGERY Right     lumpectomy   benign     SECTION      x1    HYSTERECTOMY         Time Tracking:     OT Date of Treatment: 10/28/20  OT Start Time: 1129  OT Stop Time: 1149  OT Total Time (min): 20 min    Billable Minutes:Evaluation 10  Self Care/Home Management 10    aJnki Huizar OT  10/28/2020

## 2020-10-28 NOTE — SUBJECTIVE & OBJECTIVE
"Past Medical History:   Diagnosis Date    Anticoagulant long-term use     plavix    Coronary artery disease 2020    stents x 5    Diabetes mellitus     Hypertension 2020    Overweight 3/20/2020     3/19/2020: Weight 70 kg. BMI 27.         Past Surgical History:   Procedure Laterality Date    BREAST SURGERY Right     lumpectomy   benign     SECTION      x1    HYSTERECTOMY         Review of patient's allergies indicates:  No Known Allergies    No current facility-administered medications on file prior to encounter.      Current Outpatient Medications on File Prior to Encounter   Medication Sig    aspirin (ECOTRIN) 81 MG EC tablet Take 1 tablet (81 mg total) by mouth once daily.    atorvastatin (LIPITOR) 40 MG tablet Take 1 tablet (40 mg total) by mouth once daily.    clopidogreL (PLAVIX) 75 mg tablet Take 1 tablet (75 mg total) by mouth once daily.    insulin glargine 100 units/mL (3mL) SubQ pen Inject 15 Units into the skin once daily.    lisinopriL (PRINIVIL,ZESTRIL) 5 MG tablet Take 1 tablet (5 mg total) by mouth once daily. Need to establish care with provider for more refills.    blood sugar diagnostic Strp 1 each by Misc.(Non-Drug; Combo Route) route 3 (three) times daily.    blood-glucose meter Misc USE AS DIRECTED    cetirizine (ZYRTEC) 10 MG tablet Take 1 tablet (10 mg total) by mouth once daily. (Patient not taking: Reported on 2020)    fluticasone propionate (FLONASE) 50 mcg/actuation nasal spray 1 spray (50 mcg total) by Each Nostril route 2 (two) times daily as needed for Rhinitis. (Patient not taking: Reported on 2020)    lancets 30 gauge Misc TEST AS DIRECTED  3 (three) times daily.    nitroGLYCERIN (NITROSTAT) 0.4 MG SL tablet DISSOLVE 1 TABLET UNDER THE TONGUE EVERY 5 MINUTES AS NEEDED FOR CHEST PAIN. NO MORE THAN 3 DOSES IN 24 HOURS    pen needle, diabetic (BD ULTRA-FINE MECHE PEN NEEDLE) 32 gauge x 5/32" Ndle Use once daily.     Family History     None    "     Tobacco Use    Smoking status: Never Smoker    Smokeless tobacco: Never Used   Substance and Sexual Activity    Alcohol use: No    Drug use: No    Sexual activity: Not on file     Review of Systems   Constitution: Negative.   HENT: Negative.    Eyes: Negative.    Cardiovascular: Positive for chest pain and dyspnea on exertion.   Respiratory: Negative.    Endocrine: Negative.    Hematologic/Lymphatic: Negative.    Skin: Negative.    Musculoskeletal: Negative.    Gastrointestinal: Negative for bloating, abdominal pain, constipation and diarrhea.   Genitourinary: Negative.    Neurological: Negative.      Objective:     Vital Signs (Most Recent):  Temp: 97.6 °F (36.4 °C) (10/28/20 0514)  Pulse: (!) 55 (10/28/20 0600)  Resp: 19 (10/28/20 0600)  BP: 113/71 (10/28/20 0600)  SpO2: 98 % (10/28/20 0600) Vital Signs (24h Range):  Temp:  [97.2 °F (36.2 °C)-98.3 °F (36.8 °C)] 97.6 °F (36.4 °C)  Pulse:  [47-86] 55  Resp:  [12-28] 19  SpO2:  [96 %-100 %] 98 %  BP: (105-158)/(55-82) 113/71     Weight: 70.3 kg (154 lb 15.7 oz)  Body mass index is 26.6 kg/m².    SpO2: 98 %  O2 Device (Oxygen Therapy): room air      Intake/Output Summary (Last 24 hours) at 10/28/2020 0620  Last data filed at 10/28/2020 0217  Gross per 24 hour   Intake 250 ml   Output 950 ml   Net -700 ml       Lines/Drains/Airways     Peripheral Intravenous Line                 Peripheral IV - Single Lumen 10/27/20 0636 22 G Left Hand less than 1 day                Physical Exam   Constitutional: She is oriented to person, place, and time. She appears well-developed and well-nourished. No distress.   HENT:   Head: Normocephalic and atraumatic.   Eyes: Pupils are equal, round, and reactive to light. Conjunctivae and EOM are normal. Right eye exhibits no discharge. Left eye exhibits no discharge.   Neck: Normal range of motion. Neck supple. No JVD present.   Cardiovascular: Normal rate and regular rhythm.   Pulmonary/Chest: Effort normal and breath sounds  normal. No respiratory distress.   Abdominal: Soft. Bowel sounds are normal. She exhibits no distension.   Musculoskeletal: Normal range of motion.         General: No edema.   Neurological: She is alert and oriented to person, place, and time.   Skin: Skin is warm and dry. She is not diaphoretic.   Nursing note and vitals reviewed.      Significant Labs: Troponin No results for input(s): TROPONINI in the last 48 hours. and All pertinent lab results from the last 24 hours have been reviewed.    [facility] findings showed:  The left ventricular size is normal. There is mild LV systolic dysfunction. LV systolic pressure is normal. The EF is calculated to be 45%. LV end diastolic pressure is elevated. LVEDP (Pre):22 mmHGLVEDP (Post):22 mmHGThere are wall motion abnormalities in the left ventricle.

## 2020-10-28 NOTE — ASSESSMENT & PLAN NOTE
Pt has no targets for surgical revascularization. Dr. Chavez to review and give final recommendations.

## 2020-10-28 NOTE — ASSESSMENT & PLAN NOTE
· Will order A1C. Last A1C March 2020 was 13.2  · Resumed home insulin and continue to adjust as needed  · Consider Endo consult if poorly controlled as patient needs excellent control to reduce future risk for more cardiovascular events.

## 2020-10-28 NOTE — PROGRESS NOTES
Home Oxygen Evaluation    Date Performed: 10/28/2020    1) Patient's Home O2 Sat on room air, while at rest: 97%        If O2 sats on room air at rest are 88% or below, patient qualifies. No additional testing needed. Document N/A in steps 2 and 3. If 89% or above, complete steps 2.      2) Patient's O2 Sat on room air while exercisin%        If O2 sats on room air while exercising remain 89% or above patient does not qualify, no further testing needed Document N/A in step 3. If O2 sats on room air while exercising are 88% or below, continue to step 3.      3) Patient's O2 Sat while exercising on O2: n/a     (Must show improvement from #2 for patients to qualify)    If O2 sats improve on oxygen, patient qualifies for portable oxygen. If not, the patient does not qualify.

## 2020-10-28 NOTE — HPI
Jamee Purvis is an obese 65 y.o. female with hypertension and DM2 that started having chest discomfort associated jaw pain and mild SO since 3/11/20.. She thought she had a sinus issues and was seen in the ER; however after chest tightness occurred she was admitted and went for a LHC On 3/20/2020 she underwent coronary angiography that revealed severe 3V CAD. She underwent 3 vessel stenting with 5 VIJAYA inserted. She became free from angina.      In Sept 2020 she had recurrence of her exertional angina for which coronary cath was arranged. Patient had LHC at [facility] location on 10/27/2020 with Dr. Robyn Little showing LM: Distal 30%. LAD: Osteal: Stent 50%. Mid stent subtotal. D1: 90%. LCX: Osteal 50%. Mid stent 50%. Distal 80%. RCA: Prox and mid stents patent. AV segment 80%. LV: Anterolateral severe hypokinesia. EF 45%.  No palpitations or weak spells. No bleeding. Feeling fair overall. No interventions have been documented. Patient was transferred to [facility] for surgical evaluation.

## 2020-10-28 NOTE — PLAN OF CARE
OT demi completed and goals established 10/28/2020  DANISH Gastelum/DURAN  Pager #: [phone]  10/28/2020    Problem: Occupational Therapy Goal  Goal: Occupational Therapy Goal  Description: Goals to be met by: 11/11/2020    Patient will increase functional independence with ADLs by performing:    LE Dressing with Set-up Assistance.  Grooming while standing with Supervision.  Toileting from toilet with Set-up Assistance for hygiene and clothing management.   Bathing from standing at sink with Supervision.  Toilet transfer to toilet with Supervision.    Outcome: Ongoing, Progressing

## 2020-10-28 NOTE — PLAN OF CARE
PT evaluation complete and appropriate goals established.    Molly Reyes, PT, DPT   10/28/2020  [phone]    Problem: Physical Therapy Goal  Goal: Physical Therapy Goal  Description: Goals to be met by: 2020     Patient will increase functional independence with mobility by performin. Sit to stand transfer with [place]  2. Gait  x 300 feet with Supervision without AD.   3. Ascend/descend 3 stair without Handrails Stand-by Assistance.  4. Lower extremity exercise program x15 reps, with supervision, in order to increase LE strength and (I) with functional mobility.      Outcome: Ongoing, Progressing

## 2020-10-28 NOTE — PT/OT/SLP EVAL
Physical Therapy Co-Evaluation    Patient Name:  Jamee Purvis   MRN:  [id number]    Recommendations:     Discharge Recommendations:  home health PT, home health OT   Discharge Equipment Recommendations: none   Barriers to discharge: None    Assessment:     Jamee Purvis is a 65 y.o. female admitted with a medical diagnosis of Coronary artery disease.  She presents with the following impairments/functional limitations:  weakness, impaired balance, decreased safety awareness, impaired endurance, impaired self care skills, impaired functional mobilty, gait instability, impaired cardiopulmonary response to activity. Pt completed functional mobility without physical assist or use of DME. Ambulated approx household distance; however, demo'd altered mechanics and mild instability, requiring CGA to ensure pt safety. Further progression of gait distance limited 2* impaired endurance and reported BLE pain. Pt would continue to benefit from skilled acute PT in order to address current deficits and progress functional mobility.     Rehab Prognosis: Good; patient would benefit from acute skilled PT services to address these deficits and reach maximum level of function.    Recent Surgery: Procedure(s) (LRB):  HEART CATH-LEFT (N/A) 1 Day Post-Op    Plan:     During this hospitalization, patient to be seen 3 x/week to address the identified rehab impairments via gait training, therapeutic activities, therapeutic exercises, neuromuscular re-education and progress toward the following goals:    · Plan of Care Expires:  11/26/20    Subjective     Chief Complaint: BLE pain   Patient/Family Comments/goals: return home  Pain/Comfort:  · Pain Rating 1: (did not rate)  · Location - Side 1: Bilateral  · Location 1: leg  · Pain Addressed 1: Reposition, Distraction    Patients cultural, spiritual, Restorationism conflicts given the current situation: no    Living Environment:  Pt lives with her adult daughter in a 1SH with 3 MAYO, no  handrails.   Prior to admission, patients level of function was independent with ambulation and ADLs.  Equipment used at home: none.  DME owned (not currently used): none.  Upon discharge, patient will have assistance from daughter.    Objective:     Communicated with RN prior to session.  Patient found supine with telemetry, pulse ox (continuous)  upon PT entry to room.    General Precautions: Standard, fall   Orthopedic Precautions:N/A   Braces: N/A     Exams:  · Cognitive Exam:  Patient is oriented to Person, Place, Time and Situation  · Postural Exam:  Patient presented with the following abnormalities:    · -       Rounded shoulders  · -       Forward head  · Sensation:    · -       Intact  · RLE ROM: WFL  · RLE Strength: WFL  · LLE ROM: WFL  · LLE Strength: WFL    Functional Mobility:  · Bed Mobility:     · Supine to Sit: supervision with HOB elevated and using side rail   · Transfers:     · Sit to Stand:  stand by assistance with no AD from EOB   · Gait: ~60 ft. with CGA and no AD  · Mask donned prior to ambulation outside of room  · demo'd decreased step length, decreased peter, increased time in double stance, impaired weight-shifting ability, decreased toe-floor clearance, and mild instability  · Cues provided for self-pacing and safety awareness     Therapeutic Activities and Exercises:  Pt educated on role of PT and PT POC. Pt verbalized understanding.   Pt educated on the effects of bed rest and the importance of OOB activity. Pt encouraged to sit UIC majority of day as tolerated and continue daily ambulation with nursing assist. Pt verbalized understanding.  Pt oriented to call bell and instructed to call for staff assist with standing tasks/transfers. Pt verbalized understanding.   Following PT session, OT remained at bedside for further OT assessment. OT notified PT that pt began demo'ing sudden onset of involuntary full body tremors. RN notified and immediately to bedside. Pt able to continue  conversation throughout. Tremors lasting briefly then mostly stopped but returning intermittently, primarily in head and trunk. RN present and monitoring.     AM-PAC 6 CLICK MOBILITY  Total Score:20      Patient left up in chair with all lines intact, call button in reach, RN notified and OT present.    GOALS:   Multidisciplinary Problems     Physical Therapy Goals        Problem: Physical Therapy Goal    Goal Priority Disciplines Outcome Goal Variances Interventions   Physical Therapy Goal     PT, PT/OT Ongoing, Progressing     Description: Goals to be met by: 2020     Patient will increase functional independence with mobility by performin. Sit to stand transfer with [place]  2. Gait  x 300 feet with Supervision without AD.   3. Ascend/descend 3 stair without Handrails Stand-by Assistance.  4. Lower extremity exercise program x15 reps, with supervision, in order to increase LE strength and (I) with functional mobility.                       History:     Past Medical History:   Diagnosis Date    Anticoagulant long-term use     plavix    Coronary artery disease 2020    stents x 5    Diabetes mellitus     Hypertension 2020    Overweight 3/20/2020     3/19/2020: Weight 70 kg. BMI 27.         Past Surgical History:   Procedure Laterality Date    BREAST SURGERY Right     lumpectomy   benign     SECTION      x1    HYSTERECTOMY         Time Tracking:     PT Received On: 10/28/20  PT Start Time: 1130     PT Stop Time: 1143  PT Total Time (min): 13 min     Billable Minutes: Evaluation 13  (co-eval with OT in order to assess pt functional status and establish therapy POC)    Molly Reyes, PT, DPT   10/28/2020  [phone]

## 2020-10-28 NOTE — PLAN OF CARE
"Pt. Remained free from falls/rauma/injuries. No complaints of CP/ SOB/discomfort. Pt. Stated "I'm shaky, it must be my insulin". Upon f/u pts' glucose was @ 164. Informed provider & gave her Detemir dose of 11 units. I remained at the bedside & reassessed her after 15 minutes. Pt. stated "I feel so much better". R groin site CDI. VSS. Sinus bradycardic. Fall bundle in place. POC explained, questions were answered & encouraged. Pt. Is tolerating care. WCTM.     "

## 2020-10-28 NOTE — SUBJECTIVE & OBJECTIVE
"No current facility-administered medications on file prior to encounter.      Current Outpatient Medications on File Prior to Encounter   Medication Sig    aspirin (ECOTRIN) 81 MG EC tablet Take 1 tablet (81 mg total) by mouth once daily.    atorvastatin (LIPITOR) 40 MG tablet Take 1 tablet (40 mg total) by mouth once daily.    clopidogreL (PLAVIX) 75 mg tablet Take 1 tablet (75 mg total) by mouth once daily.    insulin glargine 100 units/mL (3mL) SubQ pen Inject 15 Units into the skin once daily.    lisinopriL (PRINIVIL,ZESTRIL) 5 MG tablet Take 1 tablet (5 mg total) by mouth once daily. Need to establish care with provider for more refills.    blood sugar diagnostic Strp 1 each by Misc.(Non-Drug; Combo Route) route 3 (three) times daily.    blood-glucose meter Misc USE AS DIRECTED    cetirizine (ZYRTEC) 10 MG tablet Take 1 tablet (10 mg total) by mouth once daily. (Patient not taking: Reported on 2020)    fluticasone propionate (FLONASE) 50 mcg/actuation nasal spray 1 spray (50 mcg total) by Each Nostril route 2 (two) times daily as needed for Rhinitis. (Patient not taking: Reported on 2020)    lancets 30 gauge Misc TEST AS DIRECTED  3 (three) times daily.    nitroGLYCERIN (NITROSTAT) 0.4 MG SL tablet DISSOLVE 1 TABLET UNDER THE TONGUE EVERY 5 MINUTES AS NEEDED FOR CHEST PAIN. NO MORE THAN 3 DOSES IN 24 HOURS    pen needle, diabetic (BD ULTRA-FINE MECHE PEN NEEDLE) 32 gauge x 5/32" Ndle Use once daily.       Review of patient's allergies indicates:  No Known Allergies    Past Medical History:   Diagnosis Date    Anticoagulant long-term use     plavix    Coronary artery disease 2020    stents x 5    Diabetes mellitus     Hypertension 2020    Overweight 3/20/2020     3/19/2020: Weight 70 kg. BMI 27.       Past Surgical History:   Procedure Laterality Date    BREAST SURGERY Right     lumpectomy   benign     SECTION      x1    HYSTERECTOMY       Family History     None    "     Tobacco Use    Smoking status: Never Smoker    Smokeless tobacco: Never Used   Substance and Sexual Activity    Alcohol use: No    Drug use: No    Sexual activity: Not on file     Review of Systems   Constitutional: Negative for activity change and fatigue.   Respiratory: Negative for cough and shortness of breath.    Cardiovascular: Negative for chest pain, palpitations and leg swelling.   Gastrointestinal: Negative for abdominal pain, nausea and vomiting.   Endocrine: Negative for polydipsia, polyphagia and polyuria.   Genitourinary: Negative for dysuria.   Musculoskeletal: Negative for gait problem.   Skin: Negative for rash.   Allergic/Immunologic: Negative for immunocompromised state.   Neurological: Negative for dizziness, syncope and weakness.   Hematological: Does not bruise/bleed easily.   Psychiatric/Behavioral: Negative for behavioral problems.     Objective:     Vital Signs (Most Recent):  Temp: 97.6 °F (36.4 °C) (10/28/20 0514)  Pulse: (!) 55 (10/28/20 0600)  Resp: 19 (10/28/20 0600)  BP: 113/71 (10/28/20 0600)  SpO2: 98 % (10/28/20 0600) Vital Signs (24h Range):  Temp:  [97.6 °F (36.4 °C)-98.3 °F (36.8 °C)] 97.6 °F (36.4 °C)  Pulse:  [47-86] 55  Resp:  [12-28] 19  SpO2:  [96 %-100 %] 98 %  BP: (105-158)/(55-82) 113/71     Weight: 70.3 kg (154 lb 15.7 oz)  Body mass index is 26.6 kg/m².    SpO2: 98 %  O2 Device (Oxygen Therapy): room air     Intake/Output - Last 3 Shifts       10/26 0700 - 10/27 0659 10/27 0700 - 10/28 0659 10/28 0700 - 10/29 0659    P.O.  250     Total Intake(mL/kg)  250 (3.6)     Urine (mL/kg/hr)  950 (0.6)     Total Output  950     Net  -700            Urine Occurrence  3 x            Lines/Drains/Airways     Peripheral Intravenous Line                 Peripheral IV - Single Lumen 10/27/20 0636 22 G Left Hand 1 day                 STS Risk Score: 1.5%    Physical Exam  Constitutional:       Appearance: She is well-developed.   HENT:      Head: Normocephalic.   Eyes:       Extraocular Movements: Extraocular movements intact.   Neck:      Musculoskeletal: Normal range of motion.   Cardiovascular:      Rate and Rhythm: Normal rate and regular rhythm.      Heart sounds: Normal heart sounds.   Pulmonary:      Effort: Pulmonary effort is normal.      Breath sounds: Normal breath sounds.   Abdominal:      Palpations: Abdomen is soft.   Musculoskeletal: Normal range of motion.   Skin:     General: Skin is warm and dry.      Capillary Refill: Capillary refill takes less than 2 seconds.   Neurological:      Mental Status: She is alert and oriented to person, place, and time.   Psychiatric:         Mood and Affect: Mood normal.         Significant Labs:  BMP:   Recent Labs   Lab 10/28/20  0309   *  161*     140   K 4.3  4.3     105   CO2 26  26   BUN 14  14   CREATININE 0.8  0.8   CALCIUM 9.6  9.6   MG 1.6     CBC:   Recent Labs   Lab 10/28/20  0032   WBC 7.34  7.34   RBC 4.67  4.67   HGB 12.4  12.4   HCT 39.3  39.3     151   MCV 84  84   MCH 26.6*  26.6*   MCHC 31.6*  31.6*       Significant Diagnostics:  I have reviewed all pertinent imaging results/findings within the past 24 hours.   LHC:   Left Main   Mid LM to Dist LM lesion is 30% stenosed. This is not the culprit lesion. The lesion is < 10 mm long.   Dist LM to Prox LAD lesion is 60% stenosed. The lesion is 10 mm long. The lesion was previously treated using a stent of unknown type.   Left Anterior Descending   Mid LAD lesion is 99% stenosed. This is the culprit lesion. The lesion is 20 mm long. The lesion was previously treated using a stent of unknown type.   First Diagonal Branch   1st Diag lesion is 90% stenosed.   Left Circumflex   Prox Cx to Mid Cx lesion is 50% stenosed. The lesion was previously treated using a stent of unknown type.   Dist Cx lesion is 80% stenosed.   Right Coronary Artery   Prox RCA lesion is 20% stenosed. The lesion was previously treated using a stent of unknown  type.   Mid RCA lesion is 30% stenosed. The lesion was previously treated using a stent of unknown type.   Right Posterior Atrioventricular Artery   RPAV lesion is 80% stenosed.     ECHO: pending

## 2020-10-28 NOTE — CONSULTS
[facility]  Cardiothoracic Surgery  Consult Note    Patient Name: Jamee Purvis  MRN: [id number]  Admission Date: 10/27/2020  Attending Physician: Jose Pérez MD  Referring Provider: Robyn Little MD    Patient information was obtained from patient and past medical records.     Inpatient consult to Cardiothoracic Surgery  Consult performed by: Carolyn Huffman NP  Consult ordered by: Chrissy Bone MD  Reason for consult: CABG eval         Subjective:     Principal Problem: Coronary artery disease    History of Present Illness: Jamee Purvis is an obese 65 y.o. female with hypertension and DM2 that started having chest discomfort associated jaw pain and mild SO since 3/11/20.. She thought she had a sinus issues and was seen in the ER; however after chest tightness occurred she was admitted and went for a LHC On 3/20/2020 she underwent coronary angiography that revealed severe 3V CAD. She underwent 3 vessel stenting with 5 VIJAYA inserted. She became free from angina.      In Sept 2020 she had recurrence of her exertional angina for which coronary cath was arranged. Patient had LHC at [facility] location on 10/27/2020 with Dr. Robyn Little showing LM: Distal 30%. LAD: Osteal: Stent 50%. Mid stent subtotal. D1: 90%. LCX: Osteal 50%. Mid stent 50%. Distal 80%. RCA: Prox and mid stents patent. AV segment 80%. LV: Anterolateral severe hypokinesia. EF 45%.  No palpitations or weak spells. No bleeding. Feeling fair overall. No interventions have been documented. Patient was transferred to [facility] for surgical evaluation.    No current facility-administered medications on file prior to encounter.      Current Outpatient Medications on File Prior to Encounter   Medication Sig    aspirin (ECOTRIN) 81 MG EC tablet Take 1 tablet (81 mg total) by mouth once daily.    atorvastatin (LIPITOR) 40 MG tablet Take 1 tablet (40 mg total) by mouth once daily.    clopidogreL  "(PLAVIX) 75 mg tablet Take 1 tablet (75 mg total) by mouth once daily.    insulin glargine 100 units/mL (3mL) SubQ pen Inject 15 Units into the skin once daily.    lisinopriL (PRINIVIL,ZESTRIL) 5 MG tablet Take 1 tablet (5 mg total) by mouth once daily. Need to establish care with provider for more refills.    blood sugar diagnostic Strp 1 each by Misc.(Non-Drug; Combo Route) route 3 (three) times daily.    blood-glucose meter Misc USE AS DIRECTED    cetirizine (ZYRTEC) 10 MG tablet Take 1 tablet (10 mg total) by mouth once daily. (Patient not taking: Reported on 2020)    fluticasone propionate (FLONASE) 50 mcg/actuation nasal spray 1 spray (50 mcg total) by Each Nostril route 2 (two) times daily as needed for Rhinitis. (Patient not taking: Reported on 2020)    lancets 30 gauge Misc TEST AS DIRECTED  3 (three) times daily.    nitroGLYCERIN (NITROSTAT) 0.4 MG SL tablet DISSOLVE 1 TABLET UNDER THE TONGUE EVERY 5 MINUTES AS NEEDED FOR CHEST PAIN. NO MORE THAN 3 DOSES IN 24 HOURS    pen needle, diabetic (BD ULTRA-FINE MECHE PEN NEEDLE) 32 gauge x 5/32" Ndle Use once daily.       Review of patient's allergies indicates:  No Known Allergies    Past Medical History:   Diagnosis Date    Anticoagulant long-term use     plavix    Coronary artery disease 2020    stents x 5    Diabetes mellitus     Hypertension 2020    Overweight 3/20/2020     3/19/2020: Weight 70 kg. BMI 27.       Past Surgical History:   Procedure Laterality Date    BREAST SURGERY Right     lumpectomy   benign     SECTION      x1    HYSTERECTOMY       Family History     None        Tobacco Use    Smoking status: Never Smoker    Smokeless tobacco: Never Used   Substance and Sexual Activity    Alcohol use: No    Drug use: No    Sexual activity: Not on file     Review of Systems   Constitutional: Negative for activity change and fatigue.   Respiratory: Negative for cough and shortness of breath.    Cardiovascular: " Negative for chest pain, palpitations and leg swelling.   Gastrointestinal: Negative for abdominal pain, nausea and vomiting.   Endocrine: Negative for polydipsia, polyphagia and polyuria.   Genitourinary: Negative for dysuria.   Musculoskeletal: Negative for gait problem.   Skin: Negative for rash.   Allergic/Immunologic: Negative for immunocompromised state.   Neurological: Negative for dizziness, syncope and weakness.   Hematological: Does not bruise/bleed easily.   Psychiatric/Behavioral: Negative for behavioral problems.     Objective:     Vital Signs (Most Recent):  Temp: 97.6 °F (36.4 °C) (10/28/20 0514)  Pulse: (!) 55 (10/28/20 0600)  Resp: 19 (10/28/20 0600)  BP: 113/71 (10/28/20 0600)  SpO2: 98 % (10/28/20 0600) Vital Signs (24h Range):  Temp:  [97.6 °F (36.4 °C)-98.3 °F (36.8 °C)] 97.6 °F (36.4 °C)  Pulse:  [47-86] 55  Resp:  [12-28] 19  SpO2:  [96 %-100 %] 98 %  BP: (105-158)/(55-82) 113/71     Weight: 70.3 kg (154 lb 15.7 oz)  Body mass index is 26.6 kg/m².    SpO2: 98 %  O2 Device (Oxygen Therapy): room air     Intake/Output - Last 3 Shifts       10/26 0700 - 10/27 0659 10/27 0700 - 10/28 0659 10/28 0700 - 10/29 0659    P.O.  250     Total Intake(mL/kg)  250 (3.6)     Urine (mL/kg/hr)  950 (0.6)     Total Output  950     Net  -700            Urine Occurrence  3 x            Lines/Drains/Airways     Peripheral Intravenous Line                 Peripheral IV - Single Lumen 10/27/20 0636 22 G Left Hand 1 day                 STS Risk Score: 1.5%    Physical Exam  Constitutional:       Appearance: She is well-developed.   HENT:      Head: Normocephalic.   Eyes:      Extraocular Movements: Extraocular movements intact.   Neck:      Musculoskeletal: Normal range of motion.   Cardiovascular:      Rate and Rhythm: Normal rate and regular rhythm.      Heart sounds: Normal heart sounds.   Pulmonary:      Effort: Pulmonary effort is normal.      Breath sounds: Normal breath sounds.   Abdominal:      Palpations:  Abdomen is soft.   Musculoskeletal: Normal range of motion.   Skin:     General: Skin is warm and dry.      Capillary Refill: Capillary refill takes less than 2 seconds.   Neurological:      Mental Status: She is alert and oriented to person, place, and time.   Psychiatric:         Mood and Affect: Mood normal.         Significant Labs:  BMP:   Recent Labs   Lab 10/28/20  0309   *  161*     140   K 4.3  4.3     105   CO2 26  26   BUN 14  14   CREATININE 0.8  0.8   CALCIUM 9.6  9.6   MG 1.6     CBC:   Recent Labs   Lab 10/28/20  0032   WBC 7.34  7.34   RBC 4.67  4.67   HGB 12.4  12.4   HCT 39.3  39.3     151   MCV 84  84   MCH 26.6*  26.6*   MCHC 31.6*  31.6*       Significant Diagnostics:  I have reviewed all pertinent imaging results/findings within the past 24 hours.   LHC:   Left Main   Mid LM to Dist LM lesion is 30% stenosed. This is not the culprit lesion. The lesion is < 10 mm long.   Dist LM to Prox LAD lesion is 60% stenosed. The lesion is 10 mm long. The lesion was previously treated using a stent of unknown type.   Left Anterior Descending   Mid LAD lesion is 99% stenosed. This is the culprit lesion. The lesion is 20 mm long. The lesion was previously treated using a stent of unknown type.   First Diagonal Branch   1st Diag lesion is 90% stenosed.   Left Circumflex   Prox Cx to Mid Cx lesion is 50% stenosed. The lesion was previously treated using a stent of unknown type.   Dist Cx lesion is 80% stenosed.   Right Coronary Artery   Prox RCA lesion is 20% stenosed. The lesion was previously treated using a stent of unknown type.   Mid RCA lesion is 30% stenosed. The lesion was previously treated using a stent of unknown type.   Right Posterior Atrioventricular Artery   RPAV lesion is 80% stenosed.     ECHO: pending     Assessment/Plan:     NYHA Score: NYHA II: slight limitation of physical activity, comfortable at rest    * Coronary artery disease  Pt has no targets  for surgical revascularization. Dr. Campbell to review and give final recommendations.     Thank you for your consult. I will sign off. Please contact us if you have any additional questions.    Carolyn Huffman NP  Cardiothoracic Surgery  [facility]    I have seen the patient and reviewed the nurse practitioner's note above. I have personally interviewed and examined the patient at bedside and agree with the findings.     Ms. Purvis is a pleasant 65 year old female with coronary artery disease s/p multiple stents earlier in 2020, hypertension, and poorly controlled diabetes, who presents with angina on exertion, found to have multi vessel coronary artery disease.  Unfortunately, there are no targets for coronary artery bypass surgery.  I recommend medical management vs percutaneous coronary intervention.  If her diabetes is controlled and she has ongoing angina despite optimal medications, heart transplantation may be an option for her.      Black Campbell MD  Cardiothoracic Surgery  [facility]

## 2020-10-28 NOTE — H&P
[facility]  Cardiology  History and Physical     Patient Name: Jamee Purvis  MRN: [id number]  Admission Date: 10/27/2020  Code Status: Full Code   Attending Provider: Robyn Little MD   Primary Care Physician: [facility]  Principal Problem:Coronary artery disease    Patient information was obtained from parent and ER records.     Subjective:     Chief Complaint:  Exertional angina    HPI:  Jamee Purvis is an obese 65 y.o. female with hypertension and DM2 that started having chest discomfort associated jaw pain and mild SO since 3/11/20.. She thought she had a sinus issues and was seen in the ER; however after chest tightness occurred she was admitted and went for a LHC On 3/20/2020 she underwent coronary angiography that revealed severe 3V CAD. She underwent 3 vessel stenting with 5 VIJAYA inserted. She became free from angina.     In 2020 she had recurrence of her exertional angina for which coronary cath was arranged. Patient had LHC at [facility] location on 10/27/2020 with Dr. Robyn Little showing LM: Distal 30%. LAD: Osteal: Stent 50%. Mid stent subtotal. D1: 90%. LCX: Osteal 50%. Mid stent 50%. Distal 80%. RCA: Prox and mid stents patent. AV segment 80%. LV: Anterolateral severe hypokinesia. EF 45%.  No palpitations or weak spells. No bleeding. Feeling fair overall. No interventions have been documented. Patient was transferred to [facility] for surgical evaluation.    Past Medical History:   Diagnosis Date    Anticoagulant long-term use     plavix    Coronary artery disease 2020    stents x 5    Diabetes mellitus     Hypertension 2020    Overweight 3/20/2020     3/19/2020: Weight 70 kg. BMI 27.         Past Surgical History:   Procedure Laterality Date    BREAST SURGERY Right     lumpectomy   benign     SECTION      x1    HYSTERECTOMY         Review of patient's allergies indicates:  No Known Allergies    No  "current facility-administered medications on file prior to encounter.      Current Outpatient Medications on File Prior to Encounter   Medication Sig    aspirin (ECOTRIN) 81 MG EC tablet Take 1 tablet (81 mg total) by mouth once daily.    atorvastatin (LIPITOR) 40 MG tablet Take 1 tablet (40 mg total) by mouth once daily.    clopidogreL (PLAVIX) 75 mg tablet Take 1 tablet (75 mg total) by mouth once daily.    insulin glargine 100 units/mL (3mL) SubQ pen Inject 15 Units into the skin once daily.    lisinopriL (PRINIVIL,ZESTRIL) 5 MG tablet Take 1 tablet (5 mg total) by mouth once daily. Need to establish care with provider for more refills.    blood sugar diagnostic Strp 1 each by Misc.(Non-Drug; Combo Route) route 3 (three) times daily.    blood-glucose meter Misc USE AS DIRECTED    cetirizine (ZYRTEC) 10 MG tablet Take 1 tablet (10 mg total) by mouth once daily. (Patient not taking: Reported on 6/25/2020)    fluticasone propionate (FLONASE) 50 mcg/actuation nasal spray 1 spray (50 mcg total) by Each Nostril route 2 (two) times daily as needed for Rhinitis. (Patient not taking: Reported on 6/25/2020)    lancets 30 gauge Misc TEST AS DIRECTED  3 (three) times daily.    nitroGLYCERIN (NITROSTAT) 0.4 MG SL tablet DISSOLVE 1 TABLET UNDER THE TONGUE EVERY 5 MINUTES AS NEEDED FOR CHEST PAIN. NO MORE THAN 3 DOSES IN 24 HOURS    pen needle, diabetic (BD ULTRA-FINE MECHE PEN NEEDLE) 32 gauge x 5/32" Ndle Use once daily.     Family History     None        Tobacco Use    Smoking status: Never Smoker    Smokeless tobacco: Never Used   Substance and Sexual Activity    Alcohol use: No    Drug use: No    Sexual activity: Not on file     Review of Systems   Constitution: Negative.   HENT: Negative.    Eyes: Negative.    Cardiovascular: Positive for chest pain and dyspnea on exertion.   Respiratory: Negative.    Endocrine: Negative.    Hematologic/Lymphatic: Negative.    Skin: Negative.    Musculoskeletal: Negative.  "   Gastrointestinal: Negative for bloating, abdominal pain, constipation and diarrhea.   Genitourinary: Negative.    Neurological: Negative.      Objective:     Vital Signs (Most Recent):  Temp: 97.6 °F (36.4 °C) (10/28/20 0514)  Pulse: (!) 55 (10/28/20 0600)  Resp: 19 (10/28/20 0600)  BP: 113/71 (10/28/20 0600)  SpO2: 98 % (10/28/20 0600) Vital Signs (24h Range):  Temp:  [97.2 °F (36.2 °C)-98.3 °F (36.8 °C)] 97.6 °F (36.4 °C)  Pulse:  [47-86] 55  Resp:  [12-28] 19  SpO2:  [96 %-100 %] 98 %  BP: (105-158)/(55-82) 113/71     Weight: 70.3 kg (154 lb 15.7 oz)  Body mass index is 26.6 kg/m².    SpO2: 98 %  O2 Device (Oxygen Therapy): room air      Intake/Output Summary (Last 24 hours) at 10/28/2020 0620  Last data filed at 10/28/2020 0217  Gross per 24 hour   Intake 250 ml   Output 950 ml   Net -700 ml       Lines/Drains/Airways     Peripheral Intravenous Line                 Peripheral IV - Single Lumen 10/27/20 0636 22 G Left Hand less than 1 day                Physical Exam   Constitutional: She is oriented to person, place, and time. She appears well-developed and well-nourished. No distress.   HENT:   Head: Normocephalic and atraumatic.   Eyes: Pupils are equal, round, and reactive to light. Conjunctivae and EOM are normal. Right eye exhibits no discharge. Left eye exhibits no discharge.   Neck: Normal range of motion. Neck supple. No JVD present.   Cardiovascular: Normal rate and regular rhythm.   Pulmonary/Chest: Effort normal and breath sounds normal. No respiratory distress.   Abdominal: Soft. Bowel sounds are normal. She exhibits no distension.   Musculoskeletal: Normal range of motion.         General: No edema.   Neurological: She is alert and oriented to person, place, and time.   Skin: Skin is warm and dry. She is not diaphoretic.   Nursing note and vitals reviewed.      Significant Labs: Troponin No results for input(s): TROPONINI in the last 48 hours. and All pertinent lab results from the last 24 hours  have been reviewed.    [facility] findings showed:  The left ventricular size is normal. There is mild LV systolic dysfunction. LV systolic pressure is normal. The EF is calculated to be 45%. LV end diastolic pressure is elevated. LVEDP (Pre):22 mmHGLVEDP (Post):22 mmHGThere are wall motion abnormalities in the left ventricle.      Assessment and Plan:     CAD (coronary artery disease)  Ms. Purvis is a 64 yo F with HTN, DM2 and known severe 3-V disease and mild LV systolic dysfunction who was transferred to [facility] for [place] surgery evaluation.    · Consult CT surgery for surgical candidacy  · Continue medical therapy ASA, statin    Hypertension  · Continue lisinopril  · CTM    Diabetes mellitus, type 2  · Will order A1C. Last A1C March 2020 was 13.2  · Resumed home insulin and continue to adjust as needed  · Consider Endo consult if poorly controlled as patient needs excellent control to reduce future risk for more cardiovascular events.        VTE Risk Mitigation (From admission, onward)         Ordered     IP VTE HIGH RISK PATIENT  Once      10/27/20 2335     Place sequential compression device  Until discontinued      10/27/20 2335                Chrissy oBne MD  Cardiology   [facility]

## 2020-10-29 LAB
ANION GAP SERPL CALC-SCNC: 12 MMOL/L (ref 8–16)
ANION GAP SERPL CALC-SCNC: 8 MMOL/L (ref 8–16)
BASOPHILS # BLD AUTO: 0.03 K/UL (ref 0–0.2)
BASOPHILS # BLD AUTO: 0.03 K/UL (ref 0–0.2)
BASOPHILS NFR BLD: 0.4 % (ref 0–1.9)
BASOPHILS NFR BLD: 0.5 % (ref 0–1.9)
BUN SERPL-MCNC: 14 MG/DL (ref 8–23)
BUN SERPL-MCNC: 21 MG/DL (ref 8–23)
CALCIUM SERPL-MCNC: 10.1 MG/DL (ref 8.7–10.5)
CALCIUM SERPL-MCNC: 9.2 MG/DL (ref 8.7–10.5)
CHLORIDE SERPL-SCNC: 102 MMOL/L (ref 95–110)
CHLORIDE SERPL-SCNC: 103 MMOL/L (ref 95–110)
CO2 SERPL-SCNC: 24 MMOL/L (ref 23–29)
CO2 SERPL-SCNC: 26 MMOL/L (ref 23–29)
CREAT SERPL-MCNC: 0.7 MG/DL (ref 0.5–1.4)
CREAT SERPL-MCNC: 0.8 MG/DL (ref 0.5–1.4)
DIFFERENTIAL METHOD: ABNORMAL
DIFFERENTIAL METHOD: ABNORMAL
EOSINOPHIL # BLD AUTO: 0.3 K/UL (ref 0–0.5)
EOSINOPHIL # BLD AUTO: 0.3 K/UL (ref 0–0.5)
EOSINOPHIL NFR BLD: 3.7 % (ref 0–8)
EOSINOPHIL NFR BLD: 4 % (ref 0–8)
ERYTHROCYTE [DISTWIDTH] IN BLOOD BY AUTOMATED COUNT: 13.4 % (ref 11.5–14.5)
ERYTHROCYTE [DISTWIDTH] IN BLOOD BY AUTOMATED COUNT: 13.5 % (ref 11.5–14.5)
EST. GFR  (AFRICAN AMERICAN): >60 ML/MIN/1.73 M^2
EST. GFR  (AFRICAN AMERICAN): >60 ML/MIN/1.73 M^2
EST. GFR  (NON AFRICAN AMERICAN): >60 ML/MIN/1.73 M^2
EST. GFR  (NON AFRICAN AMERICAN): >60 ML/MIN/1.73 M^2
GLUCOSE SERPL-MCNC: 153 MG/DL (ref 70–110)
GLUCOSE SERPL-MCNC: 176 MG/DL (ref 70–110)
HCT VFR BLD AUTO: 39.2 % (ref 37–48.5)
HCT VFR BLD AUTO: 40.7 % (ref 37–48.5)
HGB BLD-MCNC: 12.5 G/DL (ref 12–16)
HGB BLD-MCNC: 12.9 G/DL (ref 12–16)
IMM GRANULOCYTES # BLD AUTO: 0.01 K/UL (ref 0–0.04)
IMM GRANULOCYTES # BLD AUTO: 0.02 K/UL (ref 0–0.04)
IMM GRANULOCYTES NFR BLD AUTO: 0.2 % (ref 0–0.5)
IMM GRANULOCYTES NFR BLD AUTO: 0.2 % (ref 0–0.5)
INR PPP: 0.9 (ref 0.8–1.2)
LYMPHOCYTES # BLD AUTO: 2.5 K/UL (ref 1–4.8)
LYMPHOCYTES # BLD AUTO: 2.7 K/UL (ref 1–4.8)
LYMPHOCYTES NFR BLD: 29.4 % (ref 18–48)
LYMPHOCYTES NFR BLD: 42 % (ref 18–48)
MAGNESIUM SERPL-MCNC: 1.8 MG/DL (ref 1.6–2.6)
MCH RBC QN AUTO: 26.7 PG (ref 27–31)
MCH RBC QN AUTO: 26.7 PG (ref 27–31)
MCHC RBC AUTO-ENTMCNC: 31.7 G/DL (ref 32–36)
MCHC RBC AUTO-ENTMCNC: 31.9 G/DL (ref 32–36)
MCV RBC AUTO: 84 FL (ref 82–98)
MCV RBC AUTO: 84 FL (ref 82–98)
MONOCYTES # BLD AUTO: 0.6 K/UL (ref 0.3–1)
MONOCYTES # BLD AUTO: 0.6 K/UL (ref 0.3–1)
MONOCYTES NFR BLD: 7.7 % (ref 4–15)
MONOCYTES NFR BLD: 9 % (ref 4–15)
NEUTROPHILS # BLD AUTO: 2.9 K/UL (ref 1.8–7.7)
NEUTROPHILS # BLD AUTO: 4.9 K/UL (ref 1.8–7.7)
NEUTROPHILS NFR BLD: 44.3 % (ref 38–73)
NEUTROPHILS NFR BLD: 58.6 % (ref 38–73)
NRBC BLD-RTO: 0 /100 WBC
NRBC BLD-RTO: 0 /100 WBC
PHOSPHATE SERPL-MCNC: 3.5 MG/DL (ref 2.7–4.5)
PLATELET # BLD AUTO: 155 K/UL (ref 150–350)
PLATELET # BLD AUTO: 174 K/UL (ref 150–350)
PMV BLD AUTO: 11.9 FL (ref 9.2–12.9)
PMV BLD AUTO: 12 FL (ref 9.2–12.9)
POCT GLUCOSE: 133 MG/DL (ref 70–110)
POCT GLUCOSE: 138 MG/DL (ref 70–110)
POCT GLUCOSE: 176 MG/DL (ref 70–110)
POCT GLUCOSE: 189 MG/DL (ref 70–110)
POTASSIUM SERPL-SCNC: 4.1 MMOL/L (ref 3.5–5.1)
POTASSIUM SERPL-SCNC: 4.3 MMOL/L (ref 3.5–5.1)
PROTHROMBIN TIME: 10.5 SEC (ref 9–12.5)
RBC # BLD AUTO: 4.68 M/UL (ref 4–5.4)
RBC # BLD AUTO: 4.84 M/UL (ref 4–5.4)
SODIUM SERPL-SCNC: 137 MMOL/L (ref 136–145)
SODIUM SERPL-SCNC: 138 MMOL/L (ref 136–145)
WBC # BLD AUTO: 6.53 K/UL (ref 3.9–12.7)
WBC # BLD AUTO: 8.36 K/UL (ref 3.9–12.7)

## 2020-10-29 PROCEDURE — 83735 ASSAY OF MAGNESIUM: CPT

## 2020-10-29 PROCEDURE — 85025 COMPLETE CBC W/AUTO DIFF WBC: CPT

## 2020-10-29 PROCEDURE — 81025 URINE PREGNANCY TEST: CPT

## 2020-10-29 PROCEDURE — 25000003 PHARM REV CODE 250: Performed by: STUDENT IN AN ORGANIZED HEALTH CARE EDUCATION/TRAINING PROGRAM

## 2020-10-29 PROCEDURE — 99233 PR SUBSEQUENT HOSPITAL CARE,LEVL III: ICD-10-PCS | Mod: ,,, | Performed by: INTERNAL MEDICINE

## 2020-10-29 PROCEDURE — 85610 PROTHROMBIN TIME: CPT

## 2020-10-29 PROCEDURE — 80048 BASIC METABOLIC PNL TOTAL CA: CPT | Mod: 91

## 2020-10-29 PROCEDURE — 36415 COLL VENOUS BLD VENIPUNCTURE: CPT

## 2020-10-29 PROCEDURE — 84100 ASSAY OF PHOSPHORUS: CPT

## 2020-10-29 PROCEDURE — 99231 SBSQ HOSP IP/OBS SF/LOW 25: CPT | Mod: ,,, | Performed by: INTERNAL MEDICINE

## 2020-10-29 PROCEDURE — 63600175 PHARM REV CODE 636 W HCPCS: Performed by: STUDENT IN AN ORGANIZED HEALTH CARE EDUCATION/TRAINING PROGRAM

## 2020-10-29 PROCEDURE — 25000003 PHARM REV CODE 250: Performed by: INTERNAL MEDICINE

## 2020-10-29 PROCEDURE — 99233 SBSQ HOSP IP/OBS HIGH 50: CPT | Mod: ,,, | Performed by: INTERNAL MEDICINE

## 2020-10-29 PROCEDURE — 20600001 HC STEP DOWN PRIVATE ROOM

## 2020-10-29 PROCEDURE — 99231 PR SUBSEQUENT HOSPITAL CARE,LEVL I: ICD-10-PCS | Mod: ,,, | Performed by: INTERNAL MEDICINE

## 2020-10-29 PROCEDURE — 80048 BASIC METABOLIC PNL TOTAL CA: CPT

## 2020-10-29 PROCEDURE — U0003 INFECTIOUS AGENT DETECTION BY NUCLEIC ACID (DNA OR RNA); SEVERE ACUTE RESPIRATORY SYNDROME CORONAVIRUS 2 (SARS-COV-2) (CORONAVIRUS DISEASE [COVID-19]), AMPLIFIED PROBE TECHNIQUE, MAKING USE OF HIGH THROUGHPUT TECHNOLOGIES AS DESCRIBED BY CMS-2020-01-R: HCPCS

## 2020-10-29 RX ORDER — INSULIN ASPART 100 [IU]/ML
0-5 INJECTION, SOLUTION INTRAVENOUS; SUBCUTANEOUS
Status: DISCONTINUED | OUTPATIENT
Start: 2020-10-29 | End: 2020-10-31 | Stop reason: HOSPADM

## 2020-10-29 RX ORDER — IBUPROFEN 200 MG
16 TABLET ORAL
Status: DISCONTINUED | OUTPATIENT
Start: 2020-10-29 | End: 2020-10-31 | Stop reason: HOSPADM

## 2020-10-29 RX ORDER — IBUPROFEN 200 MG
24 TABLET ORAL
Status: DISCONTINUED | OUTPATIENT
Start: 2020-10-29 | End: 2020-10-31 | Stop reason: HOSPADM

## 2020-10-29 RX ORDER — MAGNESIUM SULFATE HEPTAHYDRATE 40 MG/ML
2 INJECTION, SOLUTION INTRAVENOUS ONCE
Status: COMPLETED | OUTPATIENT
Start: 2020-10-29 | End: 2020-10-29

## 2020-10-29 RX ORDER — INSULIN ASPART 100 [IU]/ML
2 INJECTION, SOLUTION INTRAVENOUS; SUBCUTANEOUS
Status: DISCONTINUED | OUTPATIENT
Start: 2020-10-29 | End: 2020-10-31 | Stop reason: HOSPADM

## 2020-10-29 RX ORDER — GLUCAGON 1 MG
1 KIT INJECTION
Status: DISCONTINUED | OUTPATIENT
Start: 2020-10-29 | End: 2020-10-31 | Stop reason: HOSPADM

## 2020-10-29 RX ADMIN — HYDROCODONE BITARTRATE AND ACETAMINOPHEN 1 TABLET: 5; 325 TABLET ORAL at 06:10

## 2020-10-29 RX ADMIN — POLYETHYLENE GLYCOL 3350 17 G: 17 POWDER, FOR SOLUTION ORAL at 09:10

## 2020-10-29 RX ADMIN — ENOXAPARIN SODIUM 40 MG: 40 INJECTION SUBCUTANEOUS at 04:10

## 2020-10-29 RX ADMIN — INSULIN ASPART 2 UNITS: 100 INJECTION, SOLUTION INTRAVENOUS; SUBCUTANEOUS at 11:10

## 2020-10-29 RX ADMIN — INSULIN ASPART 2 UNITS: 100 INJECTION, SOLUTION INTRAVENOUS; SUBCUTANEOUS at 04:10

## 2020-10-29 RX ADMIN — TICAGRELOR 90 MG: 90 TABLET ORAL at 08:10

## 2020-10-29 RX ADMIN — ASPIRIN 81 MG: 81 TABLET, COATED ORAL at 08:10

## 2020-10-29 RX ADMIN — MAGNESIUM SULFATE IN WATER 2 G: 40 INJECTION, SOLUTION INTRAVENOUS at 08:10

## 2020-10-29 RX ADMIN — INSULIN ASPART 2 UNITS: 100 INJECTION, SOLUTION INTRAVENOUS; SUBCUTANEOUS at 08:10

## 2020-10-29 RX ADMIN — LISINOPRIL 5 MG: 5 TABLET ORAL at 08:10

## 2020-10-29 RX ADMIN — ACETAMINOPHEN 650 MG: 325 TABLET ORAL at 11:10

## 2020-10-29 RX ADMIN — ATORVASTATIN CALCIUM 80 MG: 20 TABLET, FILM COATED ORAL at 08:10

## 2020-10-29 RX ADMIN — ISOSORBIDE MONONITRATE 30 MG: 30 TABLET, EXTENDED RELEASE ORAL at 08:10

## 2020-10-29 RX ADMIN — CETIRIZINE HYDROCHLORIDE 10 MG: 10 TABLET, FILM COATED ORAL at 08:10

## 2020-10-29 NOTE — HOSPITAL COURSE
Patient presented to [facility]U as a transfer from [facility] on 10/28 for Cardiothoracic Surgery evaluation for CABG after patient underwent elective LHC on 10/27 for exertional angina and was found to have in stent restenosis of the obtuse marginal artery &  of proximal LAD. She was evaluated by Cardiothoracic Surgery who deemed patient was not a candidate for CABG so Interventional Cardiology here at [facility] was consulted. She underwent left heart catheretization on 10/30 which was remarkable for 75% stenosed ost LAD, 100% stenosed mid %, 80% stenosed 2nd diagonal lesion, 80% stenosed mid circumflex, and 80% stenosed 2nd obtuse marginal for which drug eluding stents were deployed OM-2, mid left circumflex, proximal LAD and mid LAD without complication. She is resumed on DAPT at this time with ASA and Brilinta. She will be referred for cardiac rehab and plans to follow up with Dr. Rizo as outpatient. Given her elevated A1C a referral for endocrinology also given at discharge.

## 2020-10-29 NOTE — ASSESSMENT & PLAN NOTE
Ms. Jamee Purvis is an obese 65 y.o. female with hypertension and poorly controlled DM2 and prior history of TVD. LHC and showed LM: Distal 30%. oLAD: Stent 50%. Mid stent subtotal. D1: 90%. oLCX: 50%. Mid stent 50%. Distal 80%. RCA: Prox and mid stents patent. CTS consulted for DM and TVD and they recommended medical management or PCI. Interventional cardiology consulted for evaluation for need of intervention.     - Poorly controlled Type II DM with TVD and has poor target for bypass   - CCS class III for anginal pain   - Agree with introducing anti anginal pain (Isosorbide dinitrite) and increase the dose as tolerated   - Continue DAPT with Aspirin and Ticagrelor 90 BID and HI statin   - Proceed with PET stress to evaluate for reversible ischemia

## 2020-10-29 NOTE — CONSULTS
[facility]  Interventional Cardiology  Consult Note    Patient Name: Jamee Purvis  MRN: [id number]  Admission Date: 10/27/2020  Hospital Length of Stay: 2 days  Code Status: Full Code   Attending Provider: Jose Pérez MD  Consulting Provider: Tal Dior MD  Primary Care Physician: Ken Barber MD  Principal Problem:Coronary artery disease    Patient information was obtained from patient and past medical records.     Inpatient consult to Interventional Cardiology  Consult performed by: Tal Dior MD  Consult ordered by: Doni Enrique MD        Subjective:     Chief Complaint:  chest pain      HPI:  Ms. Jamee Purvis is an obese 65 y.o. female with hypertension and poorly controlled DM2 and prior history of TVD that underwent LHC in 3/20/2020 for NSTEMI with trop peaked 2.1 and found to have extensive vessels disease and underwent PCI to LAD; LCX and RCA. She had severe D1 90% disease at that time. She was discharged with Aspirin and Plavis and lipitor 40 mg daily. No BB given her sinus bradycardia. She was doing fine until late July-August 2020 when she started to developed symptoms of anginal chest pain CCS class III (with washing dishes). She has to take SL nitro to alleviate the pain. The pain was intermitted and only occurred while exertion, no pain at rest. The pain was progressive until her presentation to [facility] where she underwent a repeat LHC and showed. Cath: LM: Distal 30%. LAD: Osteal: Stent 50%. Mid stent subtotal. D1: 90%. LCX: Osteal 50%. Mid stent 50%. Distal 80%. RCA: Prox and mid stents patent. AV segment 80%. CTS consulted for DM and TVD and they recommended medical management or PCI. She was started on isosorbide di nitrite 20 daily for angina pain. She had limited ambulating since admission. Interventional cardiology consulted for evaluation for need of intervention.     Past Medical History:   Diagnosis Date    Anticoagulant  "long-term use     plavix    Coronary artery disease 2020    stents x 5    Diabetes mellitus     Hypertension 2020    Overweight 3/20/2020     3/19/2020: Weight 70 kg. BMI 27.         Past Surgical History:   Procedure Laterality Date    BREAST SURGERY Right     lumpectomy   benign     SECTION      x1    HYSTERECTOMY         Review of patient's allergies indicates:  No Known Allergies    PTA Medications   Medication Sig    aspirin (ECOTRIN) 81 MG EC tablet Take 1 tablet (81 mg total) by mouth once daily.    atorvastatin (LIPITOR) 40 MG tablet Take 1 tablet (40 mg total) by mouth once daily.    clopidogreL (PLAVIX) 75 mg tablet Take 1 tablet (75 mg total) by mouth once daily.    insulin glargine 100 units/mL (3mL) SubQ pen Inject 15 Units into the skin once daily.    lisinopriL (PRINIVIL,ZESTRIL) 5 MG tablet Take 1 tablet (5 mg total) by mouth once daily. Need to establish care with provider for more refills.    blood sugar diagnostic Strp 1 each by Misc.(Non-Drug; Combo Route) route 3 (three) times daily.    blood-glucose meter Misc USE AS DIRECTED    cetirizine (ZYRTEC) 10 MG tablet Take 1 tablet (10 mg total) by mouth once daily. (Patient not taking: Reported on 2020)    fluticasone propionate (FLONASE) 50 mcg/actuation nasal spray 1 spray (50 mcg total) by Each Nostril route 2 (two) times daily as needed for Rhinitis. (Patient not taking: Reported on 2020)    lancets 30 gauge Misc TEST AS DIRECTED  3 (three) times daily.    nitroGLYCERIN (NITROSTAT) 0.4 MG SL tablet DISSOLVE 1 TABLET UNDER THE TONGUE EVERY 5 MINUTES AS NEEDED FOR CHEST PAIN. NO MORE THAN 3 DOSES IN 24 HOURS    pen needle, diabetic (BD ULTRA-FINE MECHE PEN NEEDLE) 32 gauge x " Ndle Use once daily.     Family History     None        Tobacco Use    Smoking status: Never Smoker    Smokeless tobacco: Never Used   Substance and Sexual Activity    Alcohol use: No    Drug use: No    Sexual activity: Not " on file     Review of Systems   Constitution: Positive for malaise/fatigue. Negative for chills and decreased appetite.   HENT: Negative for congestion.    Cardiovascular: Positive for chest pain and dyspnea on exertion. Negative for irregular heartbeat, leg swelling, near-syncope and paroxysmal nocturnal dyspnea.   Respiratory: Negative for cough, shortness of breath and sputum production.    Endocrine: Negative for cold intolerance and heat intolerance.   Skin: Negative for rash.   Musculoskeletal: Negative for arthritis and back pain.   Gastrointestinal: Negative for abdominal pain, constipation and diarrhea.   Genitourinary: Negative for dysuria and hematuria.   Neurological: Negative for dizziness and headaches.   Psychiatric/Behavioral: Negative for altered mental status.     Objective:     Vital Signs (Most Recent):  Temp: 96.8 °F (36 °C) (10/29/20 0353)  Pulse: (!) 54 (10/29/20 0615)  Resp: 16 (10/29/20 0615)  BP: (!) 102/58 (10/29/20 0353)  SpO2: 95 % (10/29/20 0353) Vital Signs (24h Range):  Temp:  [96.8 °F (36 °C)-98.6 °F (37 °C)] 96.8 °F (36 °C)  Pulse:  [50-88] 54  Resp:  [16-20] 16  SpO2:  [94 %-99 %] 95 %  BP: (102-128)/(58-75) 102/58     Weight: 69.9 kg (154 lb)  Body mass index is 26.43 kg/m².    SpO2: 95 %  O2 Device (Oxygen Therapy): room air      Intake/Output Summary (Last 24 hours) at 10/29/2020 0734  Last data filed at 10/29/2020 0500  Gross per 24 hour   Intake 800 ml   Output 1775 ml   Net -975 ml       Lines/Drains/Airways     Peripheral Intravenous Line                 Peripheral IV - Single Lumen 10/27/20 0636 22 G Left Hand 2 days                Physical Exam   Constitutional: She is oriented to person, place, and time. She appears well-nourished. No distress.   HENT:   Head: Normocephalic.   Eyes: Pupils are equal, round, and reactive to light.   Neck: No JVD present. No thyromegaly present.   Cardiovascular: Normal rate and regular rhythm. Exam reveals no friction rub.   No murmur  heard.  Pulses:       Radial pulses are 1+ on the right side and 1+ on the left side.        Dorsalis pedis pulses are 2+ on the right side and 2+ on the left side.        Posterior tibial pulses are 2+ on the right side and 2+ on the left side.   Pulmonary/Chest: Effort normal. No respiratory distress. She has no wheezes.   Musculoskeletal:         General: No edema.   Neurological: She is alert and oriented to person, place, and time.   Vitals reviewed.      Significant Labs:   CBC   Recent Labs   Lab 10/28/20  0032   WBC 7.34  7.34   HGB 12.4  12.4   HCT 39.3  39.3     151    and All pertinent lab results from the last 24 hours have been reviewed.    Significant Imaging: Echocardiogram:   2D echo with color flow doppler: No results found for this or any previous visit. and Transthoracic echo (TTE) complete (Cupid Only):   Results for orders placed or performed during the hospital encounter of 10/27/20   Echo Color Flow Doppler? Yes   Result Value Ref Range    BSA 1.78 m2    TDI SEPTAL 0.05 m/s    LV LATERAL E/E' RATIO 8.18 m/s    LV SEPTAL E/E' RATIO 18.00 m/s    LA WIDTH 3.36 cm    TDI LATERAL 0.11 m/s    LVIDd 4.14 3.5 - 6.0 cm    IVS 1.03 0.6 - 1.1 cm    Posterior Wall 1.05 0.6 - 1.1 cm    LVIDs 2.90 2.1 - 4.0 cm    FS 30 28 - 44 %    LA volume 47.57 cm3    Sinus 2.37 cm    STJ 2.01 cm    Ascending aorta 2.43 cm    LV mass 141.78 g    LA size 3.98 cm    RVDD 3.68 cm    TAPSE 1.85 cm    RV S' 13.46 cm/s    Left Ventricle Relative Wall Thickness 0.51 cm    AV mean gradient 6 mmHg    AV valve area 1.76 cm2    AV Velocity Ratio 0.65     AV index (prosthetic) 0.62     MV valve area p 1/2 method 3.76 cm2    E/A ratio 1.29     Mean e' 0.08 m/s    E wave decelartion time 201.95 msec    Pulm vein S/D ratio 1.38     LVOT diameter 1.90 cm    LVOT area 2.8 cm2    LVOT peak micheal 1.12 m/s    LVOT peak VTI 25.82 cm    Ao peak micheal 1.73 m/s    Ao VTI 41.55 cm    LVOT stroke volume 73.17 cm3    AV peak gradient 12  mmHg    E/E' ratio 11.25 m/s    MV Peak E Brendan 0.90 m/s    TR Max Brendan 2.49 m/s    MV stenosis pressure 1/2 time 58.57 ms    MV Peak A Brendan 0.70 m/s    PV Peak S Brendan 0.54 m/s    PV Peak D Brendan 0.39 m/s    LV Systolic Volume 32.32 mL    LV Systolic Volume Index 18.5 mL/m2    LV Diastolic Volume 75.89 mL    LV Diastolic Volume Index 43.36 mL/m2    LA Volume Index 27.2 mL/m2    LV Mass Index 81 g/m2    RA Major Axis 4.03 cm    Left Atrium Minor Axis 4.16 cm    Left Atrium Major Axis 4.21 cm    Triscuspid Valve Regurgitation Peak Gradient 25 mmHg    RA Width 3.17 cm    Right Atrial Pressure (from IVC) 3 mmHg    TV rest pulmonary artery pressure 28 mmHg    Narrative    · The left ventricle is normal in size with normal systolic function. The   estimated ejection fraction is 65%.  · There is left ventricular concentric remodeling.  · Normal left ventricular diastolic function.  · Mild to moderate tricuspid regurgitation.  · There is right ventricular hypertrophy.  · Normal right ventricular systolic function.  · Mild left atrial enlargement.  · The estimated PA systolic pressure is 28 mmHg.  · Normal central venous pressure (3 mmHg).        Assessment and Plan:       CAD (coronary artery disease)  Ms. Jamee Purvis is an obese 65 y.o. female with hypertension and poorly controlled DM2 and prior history of TVD. LHC and showed LM: Distal 30%. oLAD: Stent 50%. Mid stent subtotal. D1: 90%. oLCX: 50%. Mid stent 50%. Distal 80%. RCA: Prox and mid stents patent. CTS consulted for DM and TVD and they recommended medical management or PCI. Interventional cardiology consulted for evaluation for need of intervention.     - Poorly controlled Type II DM with TVD and has poor target for bypass   - CCS class III for anginal pain   - Agree with introducing anti anginal pain (Isosorbide dinitrite)  - Continue DAPT with Aspirin and Ticagrelor 90 BID and HI statin   - NPO midnight for LHC + PCI to LAD and LCX    Type II DM     - Agree with  control DM-II     - Consider consult Endocrine for management of her poorly controlled Type II DM      Access: Right CFA      Renal Risk Score/Predicts risk of contrast-induced nephropathy (TERESA) after PCI:    Prior Shellfish/Contrast Iodine allergy Allergy: No   Pre Cath Med: Diphenhydramine (Benadryl) 50 mg, Oral, Once, Pre-Procedure   Pre Cath Hydration: 3 cc/kg/hr IV, continuous, for 1 hour, Pre-Procedure     Sedation   Type of sedation: RN IV sedation    Mallampati score: II   ASA score: III    Informed Consent  -The risks, benefits & alternatives of the procedure were explained to the patient.    -The risks of coronary angiography include but are not limited to: Bleeding, infection, heart rhythm abnormalities, allergic reactions, kidney injury, stroke and death.    -Should stenting be indicated, the patient has agreed to dual anti-platelet therapy for 1-consecutive year with a drug-eluting stent and a minimum of 1-month with the use of a bare metal stent.    -The risks of moderate sedation include hypotension, respiratory depression, arrhythmias, bronchospasm, & death.    -Informed consent was obtained & the patient is agreeable to proceed with the procedure.      VTE Risk Mitigation (From admission, onward)         Ordered     enoxaparin injection 40 mg  Every 24 hours      10/28/20 0914     IP VTE HIGH RISK PATIENT  Once      10/27/20 2335     Place sequential compression device  Until discontinued      10/27/20 2335              Thank you for your consult. I will follow-up with patient. Please contact us if you have any additional questions.    Tal Dior MD  Interventional Cardiology   [facility]

## 2020-10-29 NOTE — ASSESSMENT & PLAN NOTE
- resume ASA 81 mg daily  - resume Brilinta 90 mg Q12H  - resume Lipitor 80 mg QD  - NPO at midnight with plans for PCI tomorrow per Interventional Cardiology  - cardiac diet  - strict I/Os and chart  - daily standing weights  - continuous telemetry

## 2020-10-29 NOTE — PLAN OF CARE
Problem: Adult Inpatient Plan of Care  Goal: Plan of Care Review  Outcome: Ongoing, Progressing      Pt free of falls/traumas/injuries. Skin remains clean, dry, and intact.   Groin site CDI. Tylenol given for headache. CBG managed with SSI, and determir. Pt re-educated on importance of measuring accurate intake and out put; pt verbalized and demonstrates understanding. Reviewed plan of care with pt; and pt verbalized understanding.  Pt AAOX4, VSS, and in no distress will continue to monitor.

## 2020-10-29 NOTE — CONSULTS
Consult received for diabetic education. Diabetic diet education provided with handout yesterday, please see nutrition progress note dated 10/28 for details.    RD to continue to monitor and follow up.    Thanks,  Ruth Faulkner RD, LD  [id number]

## 2020-10-29 NOTE — ASSESSMENT & PLAN NOTE
- hemoglobin A1c 9.0% this admission  - per MAR patient only on glargine 15 U daily as outpatient  - continue with current regimen of detemir 14 U QHS with aspart 2 U TIDWM with LDSSI   - continue to titrate insulin accordingly   - diabetic diet  - accuchecks ACHS  - will need Endocrinology follow up as outpatient, referral placed

## 2020-10-29 NOTE — PROGRESS NOTES
Plan of care discussed with patient. CTS consulted, recommended medical management. VSS. Patient is free of fall/trauma/injury. Denies CP, SOB, or pain/discomfort. All questions addressed. Will continue to monitor

## 2020-10-29 NOTE — HPI
Ms. Jamee Purvis is an obese 65 y.o. female with hypertension and poorly controlled DM2 and prior history of TVD that underwent LHC in 3/20/2020 for NSTEMI with trop peaked 2.1 and found to have extensive vessels disease and underwent PCI to LAD; LCX and RCA. She had severe D1 90% disease at that time. She was discharged with Aspirin and Plavis and lipitor 40 mg daily. No BB given her sinus bradycardia. She was doing fine until late July-August 2020 when she started to developed symptoms of anginal chest pain CCS class III (with washing dishes). She has to take SL nitro to alleviate the pain. The pain was intermitted and only occurred while exertion, no pain at rest. The pain was progressive until her presentation to [facility] where she underwent a repeat LHC and showed. Cath: LM: Distal 30%. LAD: Osteal: Stent 50%. Mid stent subtotal. D1: 90%. LCX: Osteal 50%. Mid stent 50%. Distal 80%. RCA: Prox and mid stents patent. AV segment 80%. CTS consulted for DM and TVD and they recommended medical management or PCI. She was started on isosorbide di nitrite 20 daily for angina pain. She had limited ambulating since admission. Interventional cardiology consulted for evaluation for need of intervention.

## 2020-10-29 NOTE — SUBJECTIVE & OBJECTIVE
"Interval History: Patient seen and examined this AM. No acute events overnight. Patient with complaints of headache and sinus congestion this morning relieved with Tylenol and oxycodone. She notes fleeting chest pain overnight but it has since resolved upon my assessment. She specifically states that it is "not as bad as it has been". She denies nausea, vomiting, SOB, sweats, arm, or jaw pain. She has been afebrile with pulse 70-50 bpms. She has been normo to hypotensive with systolic readings between the 110-100 mmHg. She is maintaining saturations of +94% on room air. UOP remains adequate with 1,775 mL in the last 24hrs. NPO at midnight with plan for PCI per Interventional Cardiology tomorrow.    Review of Systems   Constitution: Negative for chills, decreased appetite, diaphoresis, fever and night sweats.   HENT: Positive for congestion. Negative for sore throat.    Eyes: Negative for vision loss in left eye, vision loss in right eye and visual disturbance.   Cardiovascular: Positive for chest pain and dyspnea on exertion. Negative for irregular heartbeat, leg swelling, near-syncope, orthopnea, palpitations and syncope.   Respiratory: Negative for cough, shortness of breath, sleep disturbances due to breathing, sputum production and wheezing.    Gastrointestinal: Negative for bloating, abdominal pain, constipation, diarrhea, heartburn, melena, nausea and vomiting.   Genitourinary: Negative for dysuria, flank pain and hematuria.   Neurological: Positive for headaches. Negative for focal weakness, light-headedness and loss of balance.   Psychiatric/Behavioral: The patient does not have insomnia and is not nervous/anxious.      Objective:     Vital Signs (Most Recent):  Temp: 98.7 °F (37.1 °C) (10/29/20 1245)  Pulse: 63 (10/29/20 1506)  Resp: 20 (10/29/20 1245)  BP: 112/64 (10/29/20 1245)  SpO2: 95 % (10/29/20 1245) Vital Signs (24h Range):  Temp:  [96.8 °F (36 °C)-98.7 °F (37.1 °C)] 98.7 °F (37.1 °C)  Pulse:  " [52-88] 63  Resp:  [14-21] 20  SpO2:  [94 %-97 %] 95 %  BP: (102-121)/(58-72) 112/64     Weight: 69.4 kg (153 lb)  Body mass index is 26.26 kg/m².     SpO2: 95 %  O2 Device (Oxygen Therapy): room air      Intake/Output Summary (Last 24 hours) at 10/29/2020 1803  Last data filed at 10/29/2020 0800  Gross per 24 hour   Intake 240 ml   Output 1000 ml   Net -760 ml       Lines/Drains/Airways     Peripheral Intravenous Line                 Peripheral IV - Single Lumen 10/27/20 0636 22 G Left Hand 2 days                Physical Exam   Constitutional: She appears well-developed and well-nourished. No distress.   HENT:   Head: Normocephalic and atraumatic.   Mouth/Throat: Oropharynx is clear and moist. No oropharyngeal exudate.   Eyes: Conjunctivae and EOM are normal. Right eye exhibits no discharge. Left eye exhibits no discharge. No scleral icterus.   Neck: Normal range of motion. Neck supple. No JVD present. No tracheal deviation present.   Cardiovascular: Normal rate and intact distal pulses. Exam reveals no gallop and no friction rub.   No murmur heard.  Pulmonary/Chest: Effort normal. No respiratory distress. She has no wheezes. She has no rales.   Abdominal: Soft. Bowel sounds are normal. She exhibits no distension. There is no abdominal tenderness.   Musculoskeletal:         General: No edema.   Neurological: She is alert. She exhibits normal muscle tone. Coordination normal.   Skin: Skin is warm and dry. She is not diaphoretic. No erythema.   Psychiatric: She has a normal mood and affect. Her behavior is normal.   Nursing note and vitals reviewed.      Significant Labs:   Recent Lab Results       10/29/20  1651   10/29/20  1114   10/29/20  0817   10/29/20  0721   10/28/20  2026        Anion Gap       8       Baso #       0.03       Basophil %       0.5       BUN       14       Calcium       9.2       Chloride       103       CO2       26       Creatinine       0.7       Differential Method       Automated        eGFR if        >60.0       eGFR if non        >60.0  Comment:  Calculation used to obtain the estimated glomerular filtration  rate (eGFR) is the CKD-EPI equation.          Eos #       0.3       Eosinophil %       4.0       Glucose       153       Gran # (ANC)       2.9       Gran %       44.3       Hematocrit       39.2       Hemoglobin       12.5       Immature Grans (Abs)       0.01  Comment:  Mild elevation in immature granulocytes is non specific and   can be seen in a variety of conditions including stress response,   acute inflammation, trauma and pregnancy. Correlation with other   laboratory and clinical findings is essential.         Immature Granulocytes       0.2       Lymph #       2.7       Lymph %       42.0       Magnesium       1.8       MCH       26.7       MCHC       31.9       MCV       84       Mono #       0.6       Mono %       9.0       MPV       11.9       nRBC       0       Phosphorus       3.5       Platelets       155       POCT Glucose 133 176 138   203     Potassium       4.3       RBC       4.68       RDW       13.5       Sodium       137       WBC       6.53                            Significant Imaging: I have reviewed all imagining in the last 24 hours.

## 2020-10-29 NOTE — SUBJECTIVE & OBJECTIVE
"Past Medical History:   Diagnosis Date    Anticoagulant long-term use     plavix    Coronary artery disease 2020    stents x 5    Diabetes mellitus     Hypertension 2020    Overweight 3/20/2020     3/19/2020: Weight 70 kg. BMI 27.         Past Surgical History:   Procedure Laterality Date    BREAST SURGERY Right     lumpectomy   benign     SECTION      x1    HYSTERECTOMY         Review of patient's allergies indicates:  No Known Allergies    PTA Medications   Medication Sig    aspirin (ECOTRIN) 81 MG EC tablet Take 1 tablet (81 mg total) by mouth once daily.    atorvastatin (LIPITOR) 40 MG tablet Take 1 tablet (40 mg total) by mouth once daily.    clopidogreL (PLAVIX) 75 mg tablet Take 1 tablet (75 mg total) by mouth once daily.    insulin glargine 100 units/mL (3mL) SubQ pen Inject 15 Units into the skin once daily.    lisinopriL (PRINIVIL,ZESTRIL) 5 MG tablet Take 1 tablet (5 mg total) by mouth once daily. Need to establish care with provider for more refills.    blood sugar diagnostic Strp 1 each by Misc.(Non-Drug; Combo Route) route 3 (three) times daily.    blood-glucose meter Misc USE AS DIRECTED    cetirizine (ZYRTEC) 10 MG tablet Take 1 tablet (10 mg total) by mouth once daily. (Patient not taking: Reported on 2020)    fluticasone propionate (FLONASE) 50 mcg/actuation nasal spray 1 spray (50 mcg total) by Each Nostril route 2 (two) times daily as needed for Rhinitis. (Patient not taking: Reported on 2020)    lancets 30 gauge Misc TEST AS DIRECTED  3 (three) times daily.    nitroGLYCERIN (NITROSTAT) 0.4 MG SL tablet DISSOLVE 1 TABLET UNDER THE TONGUE EVERY 5 MINUTES AS NEEDED FOR CHEST PAIN. NO MORE THAN 3 DOSES IN 24 HOURS    pen needle, diabetic (BD ULTRA-FINE MECHE PEN NEEDLE) 32 gauge x " Ndle Use once daily.     Family History     None        Tobacco Use    Smoking status: Never Smoker    Smokeless tobacco: Never Used   Substance and Sexual Activity    " Alcohol use: No    Drug use: No    Sexual activity: Not on file     Review of Systems   Constitution: Positive for malaise/fatigue. Negative for chills and decreased appetite.   HENT: Negative for congestion.    Cardiovascular: Positive for chest pain and dyspnea on exertion. Negative for irregular heartbeat, leg swelling, near-syncope and paroxysmal nocturnal dyspnea.   Respiratory: Negative for cough, shortness of breath and sputum production.    Endocrine: Negative for cold intolerance and heat intolerance.   Skin: Negative for rash.   Musculoskeletal: Negative for arthritis and back pain.   Gastrointestinal: Negative for abdominal pain, constipation and diarrhea.   Genitourinary: Negative for dysuria and hematuria.   Neurological: Negative for dizziness and headaches.   Psychiatric/Behavioral: Negative for altered mental status.     Objective:     Vital Signs (Most Recent):  Temp: 96.8 °F (36 °C) (10/29/20 0353)  Pulse: (!) 54 (10/29/20 0615)  Resp: 16 (10/29/20 0615)  BP: (!) 102/58 (10/29/20 0353)  SpO2: 95 % (10/29/20 0353) Vital Signs (24h Range):  Temp:  [96.8 °F (36 °C)-98.6 °F (37 °C)] 96.8 °F (36 °C)  Pulse:  [50-88] 54  Resp:  [16-20] 16  SpO2:  [94 %-99 %] 95 %  BP: (102-128)/(58-75) 102/58     Weight: 69.9 kg (154 lb)  Body mass index is 26.43 kg/m².    SpO2: 95 %  O2 Device (Oxygen Therapy): room air      Intake/Output Summary (Last 24 hours) at 10/29/2020 0734  Last data filed at 10/29/2020 0500  Gross per 24 hour   Intake 800 ml   Output 1775 ml   Net -975 ml       Lines/Drains/Airways     Peripheral Intravenous Line                 Peripheral IV - Single Lumen 10/27/20 0636 22 G Left Hand 2 days                Physical Exam   Constitutional: She is oriented to person, place, and time. She appears well-nourished. No distress.   HENT:   Head: Normocephalic.   Eyes: Pupils are equal, round, and reactive to light.   Neck: No JVD present. No thyromegaly present.   Cardiovascular: Normal rate and  regular rhythm. Exam reveals no friction rub.   No murmur heard.  Pulses:       Radial pulses are 1+ on the right side and 1+ on the left side.        Dorsalis pedis pulses are 2+ on the right side and 2+ on the left side.        Posterior tibial pulses are 2+ on the right side and 2+ on the left side.   Pulmonary/Chest: Effort normal. No respiratory distress. She has no wheezes.   Musculoskeletal:         General: No edema.   Neurological: She is alert and oriented to person, place, and time.   Vitals reviewed.      Significant Labs:   CBC   Recent Labs   Lab 10/28/20  0032   WBC 7.34  7.34   HGB 12.4  12.4   HCT 39.3  39.3     151    and All pertinent lab results from the last 24 hours have been reviewed.    Significant Imaging: Echocardiogram:   2D echo with color flow doppler: No results found for this or any previous visit. and Transthoracic echo (TTE) complete (Cupid Only):   Results for orders placed or performed during the hospital encounter of 10/27/20   Echo Color Flow Doppler? Yes   Result Value Ref Range    BSA 1.78 m2    TDI SEPTAL 0.05 m/s    LV LATERAL E/E' RATIO 8.18 m/s    LV SEPTAL E/E' RATIO 18.00 m/s    LA WIDTH 3.36 cm    TDI LATERAL 0.11 m/s    LVIDd 4.14 3.5 - 6.0 cm    IVS 1.03 0.6 - 1.1 cm    Posterior Wall 1.05 0.6 - 1.1 cm    LVIDs 2.90 2.1 - 4.0 cm    FS 30 28 - 44 %    LA volume 47.57 cm3    Sinus 2.37 cm    STJ 2.01 cm    Ascending aorta 2.43 cm    LV mass 141.78 g    LA size 3.98 cm    RVDD 3.68 cm    TAPSE 1.85 cm    RV S' 13.46 cm/s    Left Ventricle Relative Wall Thickness 0.51 cm    AV mean gradient 6 mmHg    AV valve area 1.76 cm2    AV Velocity Ratio 0.65     AV index (prosthetic) 0.62     MV valve area p 1/2 method 3.76 cm2    E/A ratio 1.29     Mean e' 0.08 m/s    E wave decelartion time 201.95 msec    Pulm vein S/D ratio 1.38     LVOT diameter 1.90 cm    LVOT area 2.8 cm2    LVOT peak micheal 1.12 m/s    LVOT peak VTI 25.82 cm    Ao peak micheal 1.73 m/s    Ao VTI 41.55 cm     LVOT stroke volume 73.17 cm3    AV peak gradient 12 mmHg    E/E' ratio 11.25 m/s    MV Peak E Brendan 0.90 m/s    TR Max Brendan 2.49 m/s    MV stenosis pressure 1/2 time 58.57 ms    MV Peak A Brendan 0.70 m/s    PV Peak S Brendan 0.54 m/s    PV Peak D Brendan 0.39 m/s    LV Systolic Volume 32.32 mL    LV Systolic Volume Index 18.5 mL/m2    LV Diastolic Volume 75.89 mL    LV Diastolic Volume Index 43.36 mL/m2    LA Volume Index 27.2 mL/m2    LV Mass Index 81 g/m2    RA Major Axis 4.03 cm    Left Atrium Minor Axis 4.16 cm    Left Atrium Major Axis 4.21 cm    Triscuspid Valve Regurgitation Peak Gradient 25 mmHg    RA Width 3.17 cm    Right Atrial Pressure (from IVC) 3 mmHg    TV rest pulmonary artery pressure 28 mmHg    Narrative    · The left ventricle is normal in size with normal systolic function. The   estimated ejection fraction is 65%.  · There is left ventricular concentric remodeling.  · Normal left ventricular diastolic function.  · Mild to moderate tricuspid regurgitation.  · There is right ventricular hypertrophy.  · Normal right ventricular systolic function.  · Mild left atrial enlargement.  · The estimated PA systolic pressure is 28 mmHg.  · Normal central venous pressure (3 mmHg).

## 2020-10-29 NOTE — PROGRESS NOTES
"[facility]  Cardiology  Progress Note    Patient Name: Jamee Purvis  MRN: [id number]  Admission Date: 10/27/2020  Hospital Length of Stay: 2 days  Code Status: Full Code   Attending Physician: Jose Pérez MD   Primary Care Physician: Ken Barber MD  Expected Discharge Date:   Principal Problem:Coronary artery disease    Subjective:     Hospital Course:   [facility] as a transfer from [facility] on 10/28 for Cardiothoracic Surgery evaluation for CABG after patient underwent elective LHC on 10/27 for exertional angina and was found in stent restenosis of the obtuse marginal artery &  of proximal LAD. She was evaluated by Cardiothoracic Surgery who deemed patient was not a candidate for CABG so Interventional Cardiology here at [facility] was consulted and now with tentative plans for PCI on 10/30.    Interval History: Patient seen and examined this AM. No acute events overnight. Patient with complaints of headache and sinus congestion this morning relieved with Tylenol and oxycodone. She notes fleeting chest pain overnight but it has since resolved upon my assessment. She specifically states that it is "not as bad as it has been". She denies nausea, vomiting, SOB, sweats, arm, or jaw pain. She has been afebrile with pulse 70-50 bpms. She has been normo to hypotensive with systolic readings between the 110-100 mmHg. She is maintaining saturations of +94% on room air. UOP remains adequate with 1,775 mL in the last 24hrs. NPO at midnight with plan for PCI per Interventional Cardiology tomorrow.    Review of Systems   Constitution: Negative for chills, decreased appetite, diaphoresis, fever and night sweats.   HENT: Positive for congestion. Negative for sore throat.    Eyes: Negative for vision loss in left eye, vision loss in right eye and visual disturbance.   Cardiovascular: Positive for chest pain and dyspnea on exertion. Negative for irregular " heartbeat, leg swelling, near-syncope, orthopnea, palpitations and syncope.   Respiratory: Negative for cough, shortness of breath, sleep disturbances due to breathing, sputum production and wheezing.    Gastrointestinal: Negative for bloating, abdominal pain, constipation, diarrhea, heartburn, melena, nausea and vomiting.   Genitourinary: Negative for dysuria, flank pain and hematuria.   Neurological: Positive for headaches. Negative for focal weakness, light-headedness and loss of balance.   Psychiatric/Behavioral: The patient does not have insomnia and is not nervous/anxious.      Objective:     Vital Signs (Most Recent):  Temp: 98.7 °F (37.1 °C) (10/29/20 1245)  Pulse: 63 (10/29/20 1506)  Resp: 20 (10/29/20 1245)  BP: 112/64 (10/29/20 1245)  SpO2: 95 % (10/29/20 1245) Vital Signs (24h Range):  Temp:  [96.8 °F (36 °C)-98.7 °F (37.1 °C)] 98.7 °F (37.1 °C)  Pulse:  [52-88] 63  Resp:  [14-21] 20  SpO2:  [94 %-97 %] 95 %  BP: (102-121)/(58-72) 112/64     Weight: 69.4 kg (153 lb)  Body mass index is 26.26 kg/m².     SpO2: 95 %  O2 Device (Oxygen Therapy): room air      Intake/Output Summary (Last 24 hours) at 10/29/2020 1803  Last data filed at 10/29/2020 0800  Gross per 24 hour   Intake 240 ml   Output 1000 ml   Net -760 ml       Lines/Drains/Airways     Peripheral Intravenous Line                 Peripheral IV - Single Lumen 10/27/20 0636 22 G Left Hand 2 days                Physical Exam   Constitutional: She appears well-developed and well-nourished. No distress.   HENT:   Head: Normocephalic and atraumatic.   Mouth/Throat: Oropharynx is clear and moist. No oropharyngeal exudate.   Eyes: Conjunctivae and EOM are normal. Right eye exhibits no discharge. Left eye exhibits no discharge. No scleral icterus.   Neck: Normal range of motion. Neck supple. No JVD present. No tracheal deviation present.   Cardiovascular: Normal rate and intact distal pulses. Exam reveals no gallop and no friction rub.   No murmur  heard.  Pulmonary/Chest: Effort normal. No respiratory distress. She has no wheezes. She has no rales.   Abdominal: Soft. Bowel sounds are normal. She exhibits no distension. There is no abdominal tenderness.   Musculoskeletal:         General: No edema.   Neurological: She is alert. She exhibits normal muscle tone. Coordination normal.   Skin: Skin is warm and dry. She is not diaphoretic. No erythema.   Psychiatric: She has a normal mood and affect. Her behavior is normal.   Nursing note and vitals reviewed.      Significant Labs:   Recent Lab Results       10/29/20  1651   10/29/20  1114   10/29/20  0817   10/29/20  0721   10/28/20  2026        Anion Gap       8       Baso #       0.03       Basophil %       0.5       BUN       14       Calcium       9.2       Chloride       103       CO2       26       Creatinine       0.7       Differential Method       Automated       eGFR if        >60.0       eGFR if non        >60.0  Comment:  Calculation used to obtain the estimated glomerular filtration  rate (eGFR) is the CKD-EPI equation.          Eos #       0.3       Eosinophil %       4.0       Glucose       153       Gran # (ANC)       2.9       Gran %       44.3       Hematocrit       39.2       Hemoglobin       12.5       Immature Grans (Abs)       0.01  Comment:  Mild elevation in immature granulocytes is non specific and   can be seen in a variety of conditions including stress response,   acute inflammation, trauma and pregnancy. Correlation with other   laboratory and clinical findings is essential.         Immature Granulocytes       0.2       Lymph #       2.7       Lymph %       42.0       Magnesium       1.8       MCH       26.7       MCHC       31.9       MCV       84       Mono #       0.6       Mono %       9.0       MPV       11.9       nRBC       0       Phosphorus       3.5       Platelets       155       POCT Glucose 133 176 138   203     Potassium       4.3       RBC        4.68       RDW       13.5       Sodium       137       WBC       6.53                            Significant Imaging: I have reviewed all imagining in the last 24 hours.    Assessment and Plan:     * Coronary artery disease  - resume ASA 81 mg daily  - resume Brilinta 90 mg Q12H  - resume Lipitor 80 mg QD  - NPO at midnight with plans for PCI tomorrow per Interventional Cardiology  - cardiac diet  - strict I/Os and chart  - daily standing weights  - continuous telemetry     CAD (coronary artery disease)  Please see Coronary artery disease.    Hypertension  - resume lisinopril 5 mg daily and Imdur 30 mg daily    History of coronary artery stent placement  Please see Coronary artery disease.    Diabetes mellitus, type 2  - hemoglobin A1c 9.0% this admission  - per MAR patient only on glargine 15 U daily as outpatient  - continue with current regimen of detemir 14 U QHS with aspart 2 U TIDWM with LDSSI   - continue to titrate insulin accordingly   - diabetic diet  - accuchecks ACHS  - will need Endocrinology follow up as outpatient, referral placed        VTE Risk Mitigation (From admission, onward)         Ordered     enoxaparin injection 40 mg  Every 24 hours      10/28/20 0914     IP VTE HIGH RISK PATIENT  Once      10/27/20 2335     Place sequential compression device  Until discontinued      10/27/20 2335                Doni Enrique MD  Cardiology  [facility]

## 2020-10-30 LAB
ANION GAP SERPL CALC-SCNC: 10 MMOL/L (ref 8–16)
B-HCG UR QL: NEGATIVE
BASOPHILS # BLD AUTO: 0.03 K/UL (ref 0–0.2)
BASOPHILS NFR BLD: 0.4 % (ref 0–1.9)
BUN SERPL-MCNC: 17 MG/DL (ref 8–23)
CALCIUM SERPL-MCNC: 9.4 MG/DL (ref 8.7–10.5)
CHLORIDE SERPL-SCNC: 103 MMOL/L (ref 95–110)
CO2 SERPL-SCNC: 24 MMOL/L (ref 23–29)
CREAT SERPL-MCNC: 0.7 MG/DL (ref 0.5–1.4)
DIFFERENTIAL METHOD: ABNORMAL
EOSINOPHIL # BLD AUTO: 0.2 K/UL (ref 0–0.5)
EOSINOPHIL NFR BLD: 3.4 % (ref 0–8)
ERYTHROCYTE [DISTWIDTH] IN BLOOD BY AUTOMATED COUNT: 13.3 % (ref 11.5–14.5)
EST. GFR  (AFRICAN AMERICAN): >60 ML/MIN/1.73 M^2
EST. GFR  (NON AFRICAN AMERICAN): >60 ML/MIN/1.73 M^2
GLUCOSE SERPL-MCNC: 145 MG/DL (ref 70–110)
HCT VFR BLD AUTO: 40.4 % (ref 37–48.5)
HGB BLD-MCNC: 12.6 G/DL (ref 12–16)
IMM GRANULOCYTES # BLD AUTO: 0.01 K/UL (ref 0–0.04)
IMM GRANULOCYTES NFR BLD AUTO: 0.1 % (ref 0–0.5)
LYMPHOCYTES # BLD AUTO: 2.2 K/UL (ref 1–4.8)
LYMPHOCYTES NFR BLD: 33.3 % (ref 18–48)
MAGNESIUM SERPL-MCNC: 1.8 MG/DL (ref 1.6–2.6)
MCH RBC QN AUTO: 26.2 PG (ref 27–31)
MCHC RBC AUTO-ENTMCNC: 31.2 G/DL (ref 32–36)
MCV RBC AUTO: 84 FL (ref 82–98)
MONOCYTES # BLD AUTO: 0.6 K/UL (ref 0.3–1)
MONOCYTES NFR BLD: 8.3 % (ref 4–15)
NEUTROPHILS # BLD AUTO: 3.7 K/UL (ref 1.8–7.7)
NEUTROPHILS NFR BLD: 54.5 % (ref 38–73)
NRBC BLD-RTO: 0 /100 WBC
PHOSPHATE SERPL-MCNC: 3.4 MG/DL (ref 2.7–4.5)
PLATELET # BLD AUTO: 154 K/UL (ref 150–350)
PMV BLD AUTO: 11.8 FL (ref 9.2–12.9)
POCT GLUCOSE: 122 MG/DL (ref 70–110)
POCT GLUCOSE: 135 MG/DL (ref 70–110)
POCT GLUCOSE: 160 MG/DL (ref 70–110)
POCT GLUCOSE: 70 MG/DL (ref 70–110)
POCT GLUCOSE: 79 MG/DL (ref 70–110)
POTASSIUM SERPL-SCNC: 3.9 MMOL/L (ref 3.5–5.1)
RBC # BLD AUTO: 4.81 M/UL (ref 4–5.4)
SARS-COV-2 RNA RESP QL NAA+PROBE: NOT DETECTED
SODIUM SERPL-SCNC: 137 MMOL/L (ref 136–145)
WBC # BLD AUTO: 6.72 K/UL (ref 3.9–12.7)

## 2020-10-30 PROCEDURE — 97535 SELF CARE MNGMENT TRAINING: CPT

## 2020-10-30 PROCEDURE — C9600 PERC DRUG-EL COR STENT SING: HCPCS | Mod: 59,LC | Performed by: INTERNAL MEDICINE

## 2020-10-30 PROCEDURE — 93452 LEFT HRT CATH W/VENTRCLGRPHY: CPT | Performed by: INTERNAL MEDICINE

## 2020-10-30 PROCEDURE — 92928 PR STENT: ICD-10-PCS | Mod: 51,59,LC, | Performed by: INTERNAL MEDICINE

## 2020-10-30 PROCEDURE — 36415 COLL VENOUS BLD VENIPUNCTURE: CPT

## 2020-10-30 PROCEDURE — 99153 MOD SED SAME PHYS/QHP EA: CPT | Performed by: INTERNAL MEDICINE

## 2020-10-30 PROCEDURE — C1874 STENT, COATED/COV W/DEL SYS: HCPCS | Performed by: INTERNAL MEDICINE

## 2020-10-30 PROCEDURE — C9607 PERC D-E COR REVASC CHRO SIN: HCPCS | Mod: LD | Performed by: INTERNAL MEDICINE

## 2020-10-30 PROCEDURE — 25000003 PHARM REV CODE 250: Performed by: STUDENT IN AN ORGANIZED HEALTH CARE EDUCATION/TRAINING PROGRAM

## 2020-10-30 PROCEDURE — 92943 PRQ TRLUML REVSC CH OCC ANT: CPT | Mod: LD,,, | Performed by: INTERNAL MEDICINE

## 2020-10-30 PROCEDURE — 80048 BASIC METABOLIC PNL TOTAL CA: CPT

## 2020-10-30 PROCEDURE — C1725 CATH, TRANSLUMIN NON-LASER: HCPCS | Performed by: INTERNAL MEDICINE

## 2020-10-30 PROCEDURE — 93452 LEFT HRT CATH W/VENTRCLGRPHY: CPT | Mod: 26,51,, | Performed by: INTERNAL MEDICINE

## 2020-10-30 PROCEDURE — 92929 PR STENT, ADD'L VESSEL: CPT | Mod: LC,,, | Performed by: INTERNAL MEDICINE

## 2020-10-30 PROCEDURE — 92978 ENDOLUMINL IVUS OCT C 1ST: CPT | Mod: 26,LD,, | Performed by: INTERNAL MEDICINE

## 2020-10-30 PROCEDURE — 92928 PRQ TCAT PLMT NTRAC ST 1 LES: CPT | Mod: 51,59,LC, | Performed by: INTERNAL MEDICINE

## 2020-10-30 PROCEDURE — 25500020 PHARM REV CODE 255: Performed by: INTERNAL MEDICINE

## 2020-10-30 PROCEDURE — 93452 PR LEFT HEART CATH INJECT VETRICULOGRAPHY, IMAGE SUPERVISE/INTERP: ICD-10-PCS | Mod: 26,51,, | Performed by: INTERNAL MEDICINE

## 2020-10-30 PROCEDURE — C1760 CLOSURE DEV, VASC: HCPCS | Performed by: INTERNAL MEDICINE

## 2020-10-30 PROCEDURE — 99152 MOD SED SAME PHYS/QHP 5/>YRS: CPT | Mod: ,,, | Performed by: INTERNAL MEDICINE

## 2020-10-30 PROCEDURE — 85025 COMPLETE CBC W/AUTO DIFF WBC: CPT

## 2020-10-30 PROCEDURE — 92978 PR IVUS, CORONARY, 1ST VESSEL: ICD-10-PCS | Mod: 26,LD,, | Performed by: INTERNAL MEDICINE

## 2020-10-30 PROCEDURE — 84100 ASSAY OF PHOSPHORUS: CPT

## 2020-10-30 PROCEDURE — 92979 ENDOLUMINL IVUS OCT C EA: CPT | Mod: 26,LC,, | Performed by: INTERNAL MEDICINE

## 2020-10-30 PROCEDURE — C9601 PERC DRUG-EL COR STENT BRAN: HCPCS | Mod: LC | Performed by: INTERNAL MEDICINE

## 2020-10-30 PROCEDURE — 85347 COAGULATION TIME ACTIVATED: CPT | Performed by: INTERNAL MEDICINE

## 2020-10-30 PROCEDURE — 92979 PR INTRAVASC CORONARY SO2,ADDN VESSEL: ICD-10-PCS | Mod: 26,LC,, | Performed by: INTERNAL MEDICINE

## 2020-10-30 PROCEDURE — 25000003 PHARM REV CODE 250: Performed by: INTERNAL MEDICINE

## 2020-10-30 PROCEDURE — 92929 PR STENT, ADD'L VESSEL: ICD-10-PCS | Mod: LC,,, | Performed by: INTERNAL MEDICINE

## 2020-10-30 PROCEDURE — 63600175 PHARM REV CODE 636 W HCPCS: Performed by: INTERNAL MEDICINE

## 2020-10-30 PROCEDURE — 92979 ENDOLUMINL IVUS OCT C EA: CPT | Mod: LC | Performed by: INTERNAL MEDICINE

## 2020-10-30 PROCEDURE — C1753 CATH, INTRAVAS ULTRASOUND: HCPCS | Performed by: INTERNAL MEDICINE

## 2020-10-30 PROCEDURE — C1769 GUIDE WIRE: HCPCS | Performed by: INTERNAL MEDICINE

## 2020-10-30 PROCEDURE — 20600001 HC STEP DOWN PRIVATE ROOM

## 2020-10-30 PROCEDURE — 83735 ASSAY OF MAGNESIUM: CPT

## 2020-10-30 PROCEDURE — 92943 PR CTO: ICD-10-PCS | Mod: LD,,, | Performed by: INTERNAL MEDICINE

## 2020-10-30 PROCEDURE — 92978 ENDOLUMINL IVUS OCT C 1ST: CPT | Mod: LD | Performed by: INTERNAL MEDICINE

## 2020-10-30 PROCEDURE — C1887 CATHETER, GUIDING: HCPCS | Performed by: INTERNAL MEDICINE

## 2020-10-30 PROCEDURE — 97116 GAIT TRAINING THERAPY: CPT | Mod: CQ

## 2020-10-30 PROCEDURE — 99152 MOD SED SAME PHYS/QHP 5/>YRS: CPT | Performed by: INTERNAL MEDICINE

## 2020-10-30 PROCEDURE — 93005 ELECTROCARDIOGRAM TRACING: CPT

## 2020-10-30 PROCEDURE — C1894 INTRO/SHEATH, NON-LASER: HCPCS | Performed by: INTERNAL MEDICINE

## 2020-10-30 PROCEDURE — 99152 PR MOD CONSCIOUS SEDATION, SAME PHYS, 5+ YRS, FIRST 15 MIN: ICD-10-PCS | Mod: ,,, | Performed by: INTERNAL MEDICINE

## 2020-10-30 DEVICE — STENT RONYX25015UX RESOLUTE ONYX 2.50X15
Type: IMPLANTABLE DEVICE | Site: HEART | Status: FUNCTIONAL
Brand: RESOLUTE ONYX™

## 2020-10-30 DEVICE — STENT RONYX20015UX RESOLUTE ONYX 2.00X15
Type: IMPLANTABLE DEVICE | Site: HEART | Status: FUNCTIONAL
Brand: RESOLUTE ONYX™

## 2020-10-30 DEVICE — STENT RONYX30015UX RESOLUTE ONYX 3.00X15
Type: IMPLANTABLE DEVICE | Site: HEART | Status: FUNCTIONAL
Brand: RESOLUTE ONYX™

## 2020-10-30 DEVICE — ANGIO-SEAL VIP VASCULAR CLOSURE DEVICE
Type: IMPLANTABLE DEVICE | Site: GROIN | Status: FUNCTIONAL
Brand: ANGIO-SEAL

## 2020-10-30 DEVICE — STENT RONYX25030UX RESOLUTE ONYX 2.50X30
Type: IMPLANTABLE DEVICE | Site: HEART | Status: FUNCTIONAL
Brand: RESOLUTE ONYX™

## 2020-10-30 RX ORDER — DIPHENHYDRAMINE HCL 50 MG
50 CAPSULE ORAL ONCE
Status: COMPLETED | OUTPATIENT
Start: 2020-10-30 | End: 2020-10-30

## 2020-10-30 RX ORDER — MIDAZOLAM HYDROCHLORIDE 1 MG/ML
INJECTION, SOLUTION INTRAMUSCULAR; INTRAVENOUS
Status: DISCONTINUED | OUTPATIENT
Start: 2020-10-30 | End: 2020-10-30 | Stop reason: HOSPADM

## 2020-10-30 RX ORDER — LIDOCAINE HYDROCHLORIDE 20 MG/ML
INJECTION, SOLUTION EPIDURAL; INFILTRATION; INTRACAUDAL; PERINEURAL
Status: DISCONTINUED | OUTPATIENT
Start: 2020-10-30 | End: 2020-10-30 | Stop reason: HOSPADM

## 2020-10-30 RX ORDER — FENTANYL CITRATE 50 UG/ML
INJECTION, SOLUTION INTRAMUSCULAR; INTRAVENOUS
Status: DISCONTINUED | OUTPATIENT
Start: 2020-10-30 | End: 2020-10-30 | Stop reason: HOSPADM

## 2020-10-30 RX ORDER — CEFAZOLIN SODIUM 1 G/3ML
INJECTION, POWDER, FOR SOLUTION INTRAMUSCULAR; INTRAVENOUS
Status: DISCONTINUED | OUTPATIENT
Start: 2020-10-30 | End: 2020-10-30 | Stop reason: HOSPADM

## 2020-10-30 RX ORDER — HEPARIN SODIUM 1000 [USP'U]/ML
INJECTION, SOLUTION INTRAVENOUS; SUBCUTANEOUS
Status: DISCONTINUED | OUTPATIENT
Start: 2020-10-30 | End: 2020-10-30 | Stop reason: HOSPADM

## 2020-10-30 RX ORDER — SODIUM CHLORIDE 9 MG/ML
20 INJECTION, SOLUTION INTRAVENOUS CONTINUOUS
Status: ACTIVE | OUTPATIENT
Start: 2020-10-30 | End: 2020-10-30

## 2020-10-30 RX ORDER — DIPHENHYDRAMINE HCL 50 MG
50 CAPSULE ORAL ONCE
Status: CANCELLED | OUTPATIENT
Start: 2020-10-30 | End: 2020-10-30

## 2020-10-30 RX ORDER — SODIUM CHLORIDE 9 MG/ML
3 INJECTION, SOLUTION INTRAVENOUS CONTINUOUS
Status: CANCELLED | OUTPATIENT
Start: 2020-10-30 | End: 2020-10-30

## 2020-10-30 RX ORDER — SODIUM CHLORIDE 9 MG/ML
3 INJECTION, SOLUTION INTRAVENOUS CONTINUOUS
Status: ACTIVE | OUTPATIENT
Start: 2020-10-30 | End: 2020-10-30

## 2020-10-30 RX ORDER — NITROGLYCERIN 5 MG/ML
INJECTION, SOLUTION INTRAVENOUS
Status: DISCONTINUED | OUTPATIENT
Start: 2020-10-30 | End: 2020-10-30 | Stop reason: HOSPADM

## 2020-10-30 RX ORDER — HEPARIN SODIUM 200 [USP'U]/100ML
INJECTION, SOLUTION INTRAVENOUS
Status: DISCONTINUED | OUTPATIENT
Start: 2020-10-30 | End: 2020-10-31 | Stop reason: HOSPADM

## 2020-10-30 RX ADMIN — HYDROCODONE BITARTRATE AND ACETAMINOPHEN 1 TABLET: 5; 325 TABLET ORAL at 06:10

## 2020-10-30 RX ADMIN — DIPHENHYDRAMINE HYDROCHLORIDE 50 MG: 50 CAPSULE ORAL at 12:10

## 2020-10-30 RX ADMIN — SODIUM CHLORIDE 1000 ML: 0.9 INJECTION, SOLUTION INTRAVENOUS at 03:10

## 2020-10-30 RX ADMIN — SODIUM CHLORIDE 3 ML/KG/HR: 0.9 INJECTION, SOLUTION INTRAVENOUS at 01:10

## 2020-10-30 RX ADMIN — MELATONIN TAB 3 MG 6 MG: 3 TAB at 09:10

## 2020-10-30 RX ADMIN — CETIRIZINE HYDROCHLORIDE 10 MG: 10 TABLET, FILM COATED ORAL at 08:10

## 2020-10-30 RX ADMIN — TICAGRELOR 90 MG: 90 TABLET ORAL at 08:10

## 2020-10-30 RX ADMIN — ASPIRIN 81 MG: 81 TABLET, COATED ORAL at 08:10

## 2020-10-30 RX ADMIN — ISOSORBIDE MONONITRATE 30 MG: 30 TABLET, EXTENDED RELEASE ORAL at 08:10

## 2020-10-30 RX ADMIN — LISINOPRIL 5 MG: 5 TABLET ORAL at 08:10

## 2020-10-30 RX ADMIN — ATORVASTATIN CALCIUM 80 MG: 20 TABLET, FILM COATED ORAL at 08:10

## 2020-10-30 RX ADMIN — ACETAMINOPHEN 650 MG: 325 TABLET ORAL at 09:10

## 2020-10-30 RX ADMIN — TICAGRELOR 90 MG: 90 TABLET ORAL at 09:10

## 2020-10-30 NOTE — PLAN OF CARE
Problem: Adult Inpatient Plan of Care  Goal: Plan of Care Review        Pt free of falls/traumas/injuries. Skin remains clean, dry, and intact.   Bilateral groin prepped for LHC. Hibiclens preformed. NPO since midnight. CBG managed with SSI, and determir. Pt re-educated on importance of measuring accurate intake and out put; pt verbalized and demonstrates understanding. Reviewed plan of care with pt; and pt verbalized understanding.  Pt denies chest pain, or any discomfort tonight. Pt AAOX4, VSS, and in no distress will continue to monitor.

## 2020-10-30 NOTE — PT/OT/SLP PROGRESS
"Occupational Therapy   Treatment and Discharge Note    Name: Jamee Purvis  MRN: [id number]  Admitting Diagnosis:  Coronary artery disease      3 Days Post-Op  Pre-op Diagnosis: Coronary artery disease [I25.10]    Procedure(s):  HEART CATH-LEFT     Recommendations:     Discharge Recommendations: home health PT, home health OT  Discharge Equipment Recommendations:  none  Barriers to discharge:  None    Assessment:     Jamee Purvis is a 65 y.o. female with a medical diagnosis of Coronary artery disease. Patient is performing ADLs with supervision and no longer requires acute care OT services.    Plan:     · Patient to be discharged from OT services 10/30/2020    Subjective     Patient stated "my back hurts a little".    Pain/Comfort:  · Pain Rating 1: 9/10  · Location - Orientation 1: lower  · Location 1: back  · Pain Addressed 1: Reposition, Distraction, Nurse notified    Objective:     Communicated with: RN prior to session. Patient found HOB elevated with telemetry upon OT entry to room.    General Precautions: Standard, fall   Orthopedic Precautions:N/A   Braces: N/A     Occupational Performance:     Bed Mobility:    · Patient completed Supine to Sit with modified independence     Functional Mobility/Transfers:  · Patient completed Sit <> Stand Transfer with supervision with no assistive device   · 1x from EOB, 1x from bedside chair  · Patient completed Bed > Chair Transfer using Step Transfer technique with supervision with no assistive device  · Patient completed Toilet Transfer Step Transfer technique with supervision with  no AD  · Functional Mobility: Patient ambulated through obstacles in room with supervision to SBA with no AD.    Activities of Daily Living:  · Grooming: supervision standing at sink ~4 min  · Toileting: supervision hygiene and clothing management    [employer] [facility] 6 Click ADL: 23    Treatment & Education:   Therapist provided facilitation and instruction of proper body mechanics and fall " prevention strategies during tasks listed above.   Instructed patient to sit in bedside chair daily to increase OOB/activity tolerance.   Instructed patient to use call light to have nursing staff assist with needs/transfers.   Discussed OT POC, including plan to d/c from OT services, and answered all questions within OT scope of practice.   Whiteboard updated     Patient left up in chair with all lines intact and call button in reachEducation:      GOALS:   Multidisciplinary Problems     Occupational Therapy Goals     Not on file          Multidisciplinary Problems (Resolved)        Problem: Occupational Therapy Goal    Goal Priority Disciplines Outcome Interventions   Occupational Therapy Goal   (Resolved)     OT, PT/OT Met    Description: Goals to be met by: 11/11/2020    Patient will increase functional independence with ADLs by performing:    LE Dressing with Set-up Assistance.  Grooming while standing with Supervision.  Toileting from toilet with Set-up Assistance for hygiene and clothing management.   Bathing from standing at sink with Supervision.  Toilet transfer to toilet with Supervision.                     Time Tracking:     OT Date of Treatment: 10/30/20  OT Start Time: 0727  OT Stop Time: 0742  OT Total Time (min): 15 min    Billable Minutes:Self Care/Home Management 15    Janki Huizar OT  10/30/2020

## 2020-10-30 NOTE — PLAN OF CARE
Problem: Diabetes Comorbidity  Goal: Blood Glucose Level Within Desired Range  10/30/2020 0815 by Selma Lemon RN  Outcome: Ongoing, Progressing  10/30/2020 0815 by Selma Lemon RN  Outcome: Ongoing, Progressing     Problem: Adult Inpatient Plan of Care  Goal: Plan of Care Review  10/30/2020 0815 by Selma Lemon RN  Outcome: Ongoing, Progressing  10/30/2020 0815 by Selma Lemon RN  Outcome: Ongoing, Progressing  Goal: Patient-Specific Goal (Individualization)  10/30/2020 0815 by Selma Lemon RN  Outcome: Ongoing, Progressing  10/30/2020 0815 by Selma Lemon RN  Outcome: Ongoing, Progressing  Goal: Absence of Hospital-Acquired Illness or Injury  10/30/2020 0815 by Selma Lemon RN  Outcome: Ongoing, Progressing  10/30/2020 0815 by Selma Lemon RN  Outcome: Ongoing, Progressing  Goal: Optimal Comfort and Wellbeing  10/30/2020 0815 by Selma Lemon RN  Outcome: Ongoing, Progressing  10/30/2020 0815 by Selma Lemon RN  Outcome: Ongoing, Progressing  Goal: Readiness for Transition of Care  10/30/2020 0815 by Selma Lemon RN  Outcome: Ongoing, Progressing  10/30/2020 0815 by Selma Lemon RN  Outcome: Ongoing, Progressing  Goal: Rounds/Family Conference  10/30/2020 0815 by Selma Lemon RN  Outcome: Ongoing, Progressing  10/30/2020 0815 by Selma Lemon RN  Outcome: Ongoing, Progressing     Problem: Fall Injury Risk  Goal: Absence of Fall and Fall-Related Injury  10/30/2020 0815 by Selma Lemon RN  Outcome: Ongoing, Progressing  10/30/2020 0815 by Selma Lemon RN  Outcome: Ongoing, Progressing     Problem: Wound  Goal: Optimal Wound Healing  10/30/2020 0815 by Selma Lemon RN  Outcome: Ongoing, Progressing  10/30/2020 0815 by Selma Lemon RN  Outcome: Ongoing, Progressing

## 2020-10-30 NOTE — NURSING TRANSFER
Nursing Transfer Note      10/30/2020     Transfer To: cath lab    Transfer via stretcher    Transfer with cardiac monitoring    Transported by courrier    Medicines sent: na    Chart send with patient: Yes

## 2020-10-30 NOTE — PT/OT/SLP PROGRESS
"Physical Therapy Treatment    Patient Name:  Jamee Purvis   MRN:  [id number]    Recommendations:     Discharge Recommendations:  home health PT   Discharge Equipment Recommendations: none   Barriers to discharge: None    Assessment:     Jamee Purvis is a 65 y.o. female admitted with a medical diagnosis of Coronary artery disease.  She presents with the following impairments/functional limitations:  impaired endurance, weakness, impaired self care skills, gait instability.  Patient doing well with therapy and good OOB tolerance on her own. She was able to stand from chair without difficulty and ambulated in halls without AD with vcs for more normal pace. Will continue PT per poc    Rehab Prognosis: Good; patient would benefit from acute skilled PT services to address these deficits and reach maximum level of function.    Recent Surgery: Procedure(s) (LRB):  HEART CATH-LEFT (N/A) 3 Days Post-Op    Plan:     During this hospitalization, patient to be seen 3 x/week to address the identified rehab impairments via gait training, therapeutic activities, therapeutic exercises and progress toward the following goals:    · Plan of Care Expires:  11/26/20    Subjective     Chief Complaint: none  Patient/Family Comments/goals: "I hope I get this surgery done soon".   Pain/Comfort:  · Pain Rating 1: 0/10      Objective:     Communicated with nurse prior to session.  Patient found up in chair with telemetry upon PT entry to room.     General Precautions: Standard, fall   Orthopedic Precautions:N/A   Braces: N/A     Functional Mobility:  · Transfers:     · Sit to Stand:  supervision with no AD  · Gait: 120 ft with SBA/HHA and vcs to increase peter and step length. no LOB.       AM-PAC 6 CLICK MOBILITY  Turning over in bed (including adjusting bedclothes, sheets and blankets)?: 4  Sitting down on and standing up from a chair with arms (e.g., wheelchair, bedside commode, etc.): 3  Moving from lying on back to sitting on the " side of the bed?: 4  Moving to and from a bed to a chair (including a wheelchair)?: 3  Need to walk in hospital room?: 3  Climbing 3-5 steps with a railing?: 3  Basic Mobility Total Score: 20       Therapeutic Activities and Exercises:   -whtie board updated  -educ patient with benefit of OOB activity and encouraged to continue     Patient left up in chair with all lines intact and call button in reach..    GOALS:   Multidisciplinary Problems     Physical Therapy Goals        Problem: Physical Therapy Goal    Goal Priority Disciplines Outcome Goal Variances Interventions   Physical Therapy Goal     PT, PT/OT Ongoing, Progressing     Description: Goals to be met by: 2020     Patient will increase functional independence with mobility by performin. Sit to stand transfer with [place]  2. Gait  x 300 feet with Supervision without AD.   3. Ascend/descend 3 stair without Handrails Stand-by Assistance.  4. Lower extremity exercise program x15 reps, with supervision, in order to increase LE strength and (I) with functional mobility.                       Time Tracking:     PT Received On: 10/30/20  PT Start Time: 923     PT Stop Time: 931  PT Total Time (min): 8 min     Billable Minutes: Gait Training 8    Treatment Type: Treatment  PT/PTA: PTA     PTA Visit Number: 1     Petty Cardona PTA  10/30/2020

## 2020-10-30 NOTE — PLAN OF CARE
Problem: Physical Therapy Goal  Goal: Physical Therapy Goal  Description: Goals to be met by: 2020     Patient will increase functional independence with mobility by performin. Sit to stand transfer with [place]  2. Gait  x 300 feet with Supervision without AD.   3. Ascend/descend 3 stair without Handrails Stand-by Assistance.  4. Lower extremity exercise program x15 reps, with supervision, in order to increase LE strength and (I) with functional mobility.      Outcome: Ongoing, Progressing

## 2020-10-30 NOTE — SUBJECTIVE & OBJECTIVE
Interval History: Patient seen and examined this AM. No acute events overnight. Patient with complaints of lumbar back pain this AM and again passive complaints of chest pain overnight that has since resolved upon my assessment. She's denies severity similar to prior episodes of chest pain and specifically denies nausea, vomiting, SOB, sweats, arm, or jaw pain. She has been afebrile with pulse 70-50 bpms. She has been normo to hypotensive with systolic readings between the 120-90 mmHg. She is maintaining saturations of +90% on room air. UOP remains adequate with ~950 mL in the last 24hrs. Underwent PCI today with 4 stents placed. She tolerated procedure without issue, resuming DAPT.    Review of Systems   Constitution: Negative for chills, decreased appetite, diaphoresis, fever and night sweats.   HENT: Negative for sore throat.    Eyes: Negative for vision loss in left eye, vision loss in right eye and visual disturbance.   Cardiovascular: Positive for chest pain. Negative for dyspnea on exertion, irregular heartbeat, leg swelling, near-syncope, orthopnea, palpitations and syncope.   Respiratory: Negative for cough, shortness of breath, sleep disturbances due to breathing and wheezing.    Musculoskeletal: Positive for back pain.   Gastrointestinal: Negative for bloating, abdominal pain, constipation, diarrhea, heartburn, melena, nausea and vomiting.   Genitourinary: Negative for dysuria, flank pain and hematuria.   Neurological: Negative for focal weakness, headaches, light-headedness and loss of balance.   Psychiatric/Behavioral: The patient does not have insomnia and is not nervous/anxious.      Objective:     Vital Signs (Most Recent):  Temp: 97.5 °F (36.4 °C) (10/30/20 1541)  Pulse: 79 (10/30/20 1700)  Resp: 16 (10/30/20 1700)  BP: 129/78 (10/30/20 1700)  SpO2: 96 % (10/30/20 1700) Vital Signs (24h Range):  Temp:  [97.5 °F (36.4 °C)-98.2 °F (36.8 °C)] 97.5 °F (36.4 °C)  Pulse:  [51-80] 79  Resp:  [14-25]  16  SpO2:  [90 %-99 %] 96 %  BP: ()/(55-78) 129/78     Weight: 69.4 kg (153 lb)  Body mass index is 26.26 kg/m².     SpO2: 96 %  O2 Device (Oxygen Therapy): nasal cannula      Intake/Output Summary (Last 24 hours) at 10/30/2020 1721  Last data filed at 10/30/2020 0300  Gross per 24 hour   Intake 240 ml   Output 650 ml   Net -410 ml       Lines/Drains/Airways     Peripheral Intravenous Line                 Peripheral IV - Single Lumen 10/27/20 0636 22 G Left Hand 3 days         Peripheral IV - Single Lumen 10/30/20 1305 20 G Anterior;Distal;Left Forearm less than 1 day                Physical Exam   Constitutional: She appears well-developed and well-nourished. No distress.   HENT:   Head: Normocephalic and atraumatic.   Mouth/Throat: Oropharynx is clear and moist. No oropharyngeal exudate.   Eyes: Conjunctivae and EOM are normal. Right eye exhibits no discharge. Left eye exhibits no discharge. No scleral icterus.   Neck: Normal range of motion. Neck supple. No JVD present. No tracheal deviation present.   Cardiovascular: Normal rate and intact distal pulses. Exam reveals no gallop and no friction rub.   No murmur heard.  Pulmonary/Chest: Effort normal. No respiratory distress. She has no wheezes. She has no rales.   Abdominal: Soft. Bowel sounds are normal. She exhibits no distension. There is no abdominal tenderness.   Musculoskeletal:         General: No edema.   Neurological: She is alert. She exhibits normal muscle tone. Coordination normal.   Skin: Skin is warm and dry. She is not diaphoretic. No erythema.   Psychiatric: She has a normal mood and affect. Her behavior is normal.   Nursing note and vitals reviewed.      Significant Labs:   Recent Lab Results       10/30/20  1610   10/30/20  1107   10/30/20  0729   10/30/20  0512   10/30/20  0511        Anion Gap         10     Baso #       0.03       Basophil %       0.4       BUN         17     Calcium         9.4     Chloride         103     CO2          24     Creatinine         0.7     Differential Method       Automated       eGFR if          >60.0     eGFR if non          >60.0  Comment:  Calculation used to obtain the estimated glomerular filtration  rate (eGFR) is the CKD-EPI equation.        Eos #       0.2       Eosinophil %       3.4       Glucose         145     Gran # (ANC)       3.7       Gran %       54.5       Hematocrit       40.4       Hemoglobin       12.6       Immature Grans (Abs)       0.01  Comment:  Mild elevation in immature granulocytes is non specific and   can be seen in a variety of conditions including stress response,   acute inflammation, trauma and pregnancy. Correlation with other   laboratory and clinical findings is essential.         Immature Granulocytes       0.1       INR               Lymph #       2.2       Lymph %       33.3       Magnesium         1.8     MCH       26.2       MCHC       31.2       MCV       84       Mono #       0.6       Mono %       8.3       MPV       11.8       nRBC       0       Phosphorus         3.4     Platelets       154       POCT Glucose 79 122 135         Potassium         3.9     Preg Test, Ur               Protime               RBC       4.81       RDW       13.3       SARS-CoV2 (COVID-19) Qualitative PCR               Sodium         137     WBC       6.72                        10/29/20  2238   10/29/20  2038   10/29/20  1949   10/29/20  1948        Anion Gap       12     Baso #     0.03       Basophil %     0.4       BUN       21     Calcium       10.1     Chloride       102     CO2       24     Creatinine       0.8     Differential Method     Automated       eGFR if        >60.0     eGFR if non        >60.0  Comment:  Calculation used to obtain the estimated glomerular filtration  rate (eGFR) is the CKD-EPI equation.        Eos #     0.3       Eosinophil %     3.7       Glucose       176     Gran # (ANC)     4.9       Gran %      58.6       Hematocrit     40.7       Hemoglobin     12.9       Immature Grans (Abs)     0.02  Comment:  Mild elevation in immature granulocytes is non specific and   can be seen in a variety of conditions including stress response,   acute inflammation, trauma and pregnancy. Correlation with other   laboratory and clinical findings is essential.         Immature Granulocytes     0.2       INR       0.9  Comment:  Coumadin Therapy:  2.0 - 3.0 for INR for all indicators except mechanical heart valves  and antiphospholipid syndromes which should use 2.5 - 3.5.       Lymph #     2.5       Lymph %     29.4       Magnesium             MCH     26.7       MCHC     31.7       MCV     84       Mono #     0.6       Mono %     7.7       MPV     12.0       nRBC     0       Phosphorus             Platelets     174       POCT Glucose   189         Potassium       4.1     Preg Test, Ur Negative           Protime       10.5     RBC     4.84       RDW     13.4       SARS-CoV2 (COVID-19) Qualitative PCR Not Detected  Comment:  This test utilizes a real-time reverse transcription  polymerase chain reaction procedure to amplify and   detect the SARS-CoV-2 RdRp and N genes.    The analytical sensitivity (limit of detection) of   this assay is 100 copies/mL.   A Detected result is considered positive for COVID-19.  This patient is considered infected with the   SARS-CoV-2 virus and is presumed to be contagious.    A Not Detected result means that SARS-CoV-2 RNA is not  present above the limit of detection. It does not rule  out the possibility of COVID-19 and should not be the  sole basis for treatment decisions.  If COVID-19 is   strongly suspected based on clinical and exposure   history,re-testing should be considered.    This test is only for use under Food and Drug   Administration s Emergency Use Authorization (EUA).   Commercial reagents are provided by [employer].  Performance characteristics of the EUA have been    independently verified by [facility]   Department of Pathology and Laboratory Medicine.               Sodium       138     WBC     8.36             Significant Imaging: I have reviewed all imagining in the last 24 hours.

## 2020-10-30 NOTE — PLAN OF CARE
Plan of care discussed with patient. Patient is free of fall/trauma/injury. Denies CP, SOB, or pain/discomfort. C scheduled for today. Pt is ACHS glucose checks. All questions addressed. Will continue to monitor

## 2020-10-30 NOTE — PLAN OF CARE
Pt is not medically ready to discharge.    Disp: Home with HH    CM will follow-up and assist team with discharge needs.      Eliza Rojas RN  PRN  - [facility]  [phone]

## 2020-10-30 NOTE — OP NOTE
"    Post Cath Note  Referring Physician: Austin Aguilar MD  Procedure: Left heart cath (Bilateral), IVUS, Coronary, Stent, Drug Eluting, Multi Vessel, Coronary      : Piter Rizo MD     Referring Physician: Robyn Little     All Operators: Surgeon(s):  Piter Rizo MD     Preoperative Diagnosis: CAD (coronary artery disease) [I25.10]Exertional angina [I20.8]ACS (acute coronary syndrome) [I24.9]     Postop Diagnosis: s/p multi-vessel PCI    Treatments/Procedures: Procedure(s) (LRB):  Left heart cath (Bilateral)  IVUS, Coronary  Stent, Drug Eluting, Multi Vessel, Coronary    Estimated Blood loss: <50 cc         Access: Bilateral CFA    LVEDP 12 mmHg    See full report for further details    Intervention:   S/p OM-2 VIJAYA x 1  S/p Mid LCx VIJAYA x 1  S/p successful  revascularization antegrade access - VIJAYA x 1 mLAD  VIJAYA x 1 placed prox LAD    Closure device: Angioseal    Post Cath Exam:   /68   Pulse 78   Temp 98.2 °F (36.8 °C)   Resp 14   Ht 5' 4" (1.626 m)   Wt 69.4 kg (153 lb)   SpO2 95%   Breastfeeding No   BMI 26.26 kg/m²   No unusual pain, hematoma, thrill or bruit at vascular access site.  Distal pulse present without signs of ischemia.    Recommendations:   - Routine post-cath care  - IVF at 3cc/kg/hr for 4 hrs  - Cardiac rehab referral, Continue medical management, Risk factor reduction, Follow-up with outpatient cardiologist  - Continue DAPT  -follow up with Dr Little as outpatient    Danny Humphries  "

## 2020-10-30 NOTE — NURSING TRANSFER
Nursing Transfer Note      10/30/2020     Transfer To: [facility]    Transfer via stretcher    Transfer with cardiac monitoring    Transported by RN    Medicines sent: NS gtts    Chart send with patient: Yes    Notified: family    Patient reassessed at: 1540 (date, time)    Upon arrival to floor: cardiac monitor applied, patient oriented to room, call bell in reach and bed in lowest position

## 2020-10-30 NOTE — ASSESSMENT & PLAN NOTE
- s/p PCI with with VIJAYA x4 on 10/30  - resume ASA 81 mg daily  - resume Brilinta 90 mg Q12H  - resume Lipitor 80 mg QD  - cardiac diet  - strict I/Os and chart  - daily standing weights  - continuous telemetry

## 2020-10-30 NOTE — PLAN OF CARE
Patient has met goals and no longer requires OT services.   Janki Huizar OTR/L  Pager #: [phone]  10/30/2020    Problem: Occupational Therapy Goal  Goal: Occupational Therapy Goal  Description: Goals to be met by: 11/11/2020    Patient will increase functional independence with ADLs by performing:    LE Dressing with Set-up Assistance.  Grooming while standing with Supervision.  Toileting from toilet with Set-up Assistance for hygiene and clothing management.   Bathing from standing at sink with Supervision.  Toilet transfer to toilet with Supervision.    Outcome: Met

## 2020-10-31 VITALS
TEMPERATURE: 99 F | HEIGHT: 64 IN | RESPIRATION RATE: 12 BRPM | HEART RATE: 100 BPM | DIASTOLIC BLOOD PRESSURE: 83 MMHG | BODY MASS INDEX: 26.12 KG/M2 | SYSTOLIC BLOOD PRESSURE: 137 MMHG | WEIGHT: 153 LBS | OXYGEN SATURATION: 98 %

## 2020-10-31 LAB
ANION GAP SERPL CALC-SCNC: 8 MMOL/L (ref 8–16)
BASOPHILS # BLD AUTO: 0.03 K/UL (ref 0–0.2)
BASOPHILS NFR BLD: 0.4 % (ref 0–1.9)
BUN SERPL-MCNC: 14 MG/DL (ref 8–23)
CALCIUM SERPL-MCNC: 9.3 MG/DL (ref 8.7–10.5)
CHLORIDE SERPL-SCNC: 103 MMOL/L (ref 95–110)
CO2 SERPL-SCNC: 24 MMOL/L (ref 23–29)
CREAT SERPL-MCNC: 0.7 MG/DL (ref 0.5–1.4)
DIFFERENTIAL METHOD: ABNORMAL
EOSINOPHIL # BLD AUTO: 0.2 K/UL (ref 0–0.5)
EOSINOPHIL NFR BLD: 2.2 % (ref 0–8)
ERYTHROCYTE [DISTWIDTH] IN BLOOD BY AUTOMATED COUNT: 13.4 % (ref 11.5–14.5)
EST. GFR  (AFRICAN AMERICAN): >60 ML/MIN/1.73 M^2
EST. GFR  (NON AFRICAN AMERICAN): >60 ML/MIN/1.73 M^2
GLUCOSE SERPL-MCNC: 115 MG/DL (ref 70–110)
HCT VFR BLD AUTO: 37.7 % (ref 37–48.5)
HGB BLD-MCNC: 11.7 G/DL (ref 12–16)
IMM GRANULOCYTES # BLD AUTO: 0.03 K/UL (ref 0–0.04)
IMM GRANULOCYTES NFR BLD AUTO: 0.4 % (ref 0–0.5)
LYMPHOCYTES # BLD AUTO: 2.1 K/UL (ref 1–4.8)
LYMPHOCYTES NFR BLD: 28 % (ref 18–48)
MAGNESIUM SERPL-MCNC: 1.6 MG/DL (ref 1.6–2.6)
MCH RBC QN AUTO: 26.5 PG (ref 27–31)
MCHC RBC AUTO-ENTMCNC: 31 G/DL (ref 32–36)
MCV RBC AUTO: 86 FL (ref 82–98)
MONOCYTES # BLD AUTO: 0.7 K/UL (ref 0.3–1)
MONOCYTES NFR BLD: 8.6 % (ref 4–15)
NEUTROPHILS # BLD AUTO: 4.6 K/UL (ref 1.8–7.7)
NEUTROPHILS NFR BLD: 60.4 % (ref 38–73)
NRBC BLD-RTO: 0 /100 WBC
PHOSPHATE SERPL-MCNC: 4 MG/DL (ref 2.7–4.5)
PLATELET # BLD AUTO: 145 K/UL (ref 150–350)
PMV BLD AUTO: 11.3 FL (ref 9.2–12.9)
POCT GLUCOSE: 110 MG/DL (ref 70–110)
POCT GLUCOSE: 194 MG/DL (ref 70–110)
POTASSIUM SERPL-SCNC: 3.9 MMOL/L (ref 3.5–5.1)
RBC # BLD AUTO: 4.41 M/UL (ref 4–5.4)
SODIUM SERPL-SCNC: 135 MMOL/L (ref 136–145)
WBC # BLD AUTO: 7.6 K/UL (ref 3.9–12.7)

## 2020-10-31 PROCEDURE — 85025 COMPLETE CBC W/AUTO DIFF WBC: CPT

## 2020-10-31 PROCEDURE — 80048 BASIC METABOLIC PNL TOTAL CA: CPT

## 2020-10-31 PROCEDURE — 25000003 PHARM REV CODE 250: Performed by: INTERNAL MEDICINE

## 2020-10-31 PROCEDURE — 84100 ASSAY OF PHOSPHORUS: CPT

## 2020-10-31 PROCEDURE — 25000003 PHARM REV CODE 250: Performed by: STUDENT IN AN ORGANIZED HEALTH CARE EDUCATION/TRAINING PROGRAM

## 2020-10-31 PROCEDURE — 99239 PR HOSPITAL DISCHARGE DAY,>30 MIN: ICD-10-PCS | Mod: ,,, | Performed by: INTERNAL MEDICINE

## 2020-10-31 PROCEDURE — 83735 ASSAY OF MAGNESIUM: CPT

## 2020-10-31 PROCEDURE — 36415 COLL VENOUS BLD VENIPUNCTURE: CPT

## 2020-10-31 PROCEDURE — 99239 HOSP IP/OBS DSCHRG MGMT >30: CPT | Mod: ,,, | Performed by: INTERNAL MEDICINE

## 2020-10-31 RX ORDER — ISOSORBIDE MONONITRATE 30 MG/1
30 TABLET, EXTENDED RELEASE ORAL DAILY
Qty: 30 TABLET | Refills: 11 | Status: SHIPPED | OUTPATIENT
Start: 2020-11-01 | End: 2020-12-03 | Stop reason: SDUPTHER

## 2020-10-31 RX ORDER — ATORVASTATIN CALCIUM 80 MG/1
80 TABLET, FILM COATED ORAL DAILY
Qty: 90 TABLET | Refills: 3 | Status: SHIPPED | OUTPATIENT
Start: 2020-11-01 | End: 2020-12-03 | Stop reason: SDUPTHER

## 2020-10-31 RX ORDER — NITROGLYCERIN 0.4 MG/1
0.4 TABLET SUBLINGUAL EVERY 5 MIN PRN
Qty: 25 TABLET | Refills: 3 | Status: SHIPPED | OUTPATIENT
Start: 2020-10-31 | End: 2020-12-03 | Stop reason: SDUPTHER

## 2020-10-31 RX ADMIN — ATORVASTATIN CALCIUM 80 MG: 20 TABLET, FILM COATED ORAL at 08:10

## 2020-10-31 RX ADMIN — TICAGRELOR 90 MG: 90 TABLET ORAL at 08:10

## 2020-10-31 RX ADMIN — CETIRIZINE HYDROCHLORIDE 10 MG: 10 TABLET, FILM COATED ORAL at 08:10

## 2020-10-31 RX ADMIN — POLYETHYLENE GLYCOL 3350 17 G: 17 POWDER, FOR SOLUTION ORAL at 08:10

## 2020-10-31 RX ADMIN — INSULIN ASPART 2 UNITS: 100 INJECTION, SOLUTION INTRAVENOUS; SUBCUTANEOUS at 08:10

## 2020-10-31 RX ADMIN — ISOSORBIDE MONONITRATE 30 MG: 30 TABLET, EXTENDED RELEASE ORAL at 08:10

## 2020-10-31 RX ADMIN — INSULIN ASPART 2 UNITS: 100 INJECTION, SOLUTION INTRAVENOUS; SUBCUTANEOUS at 12:10

## 2020-10-31 RX ADMIN — ASPIRIN 81 MG: 81 TABLET, COATED ORAL at 08:10

## 2020-10-31 RX ADMIN — LISINOPRIL 5 MG: 5 TABLET ORAL at 08:10

## 2020-10-31 NOTE — PROGRESS NOTES
[facility]  Cardiology  Progress Note    Patient Name: Jamee Purvis  MRN: [id number]  Admission Date: 10/27/2020  Hospital Length of Stay: 4 days  Code Status: Full Code   Attending Physician: Austin Aguilar MD   Primary Care Physician: Ken Barber MD  Expected Discharge Date: 10/31/2020  Principal Problem:Coronary artery disease    Subjective:     Hospital Course:   Patient presented to [facility] as a transfer from [facility] on 10/28 for Cardiothoracic Surgery evaluation for CABG after patient underwent elective LHC on 10/27 for exertional angina and was found to have in stent restenosis of the obtuse marginal artery &  of proximal LAD. She was evaluated by Cardiothoracic Surgery who deemed patient was not a candidate for CABG so Interventional Cardiology here at [facility] was consulted. She underwent left heart catheretization on 10/30 which was remarkable for 75% stenosed ost LAD, 100% stenosed mid %, 80% stenosed 2nd diagonal lesion, 80% stenosed mid circumflex, and 80% stenosed 2nd obtuse marginal for which drug eluding stents were deployed OM-2, mid left circumflex, proximal LAD and mid LAD without complication. She is resumed on DAPT at this time with ASA and Brilinta.     Interval History:   No acute events over night. No active complaints at this time.  Denies chest pain, SOB, palpitations    ROS  Objective:     Vital Signs (Most Recent):  Temp: 96.5 °F (35.8 °C) (10/31/20 0718)  Pulse: 100 (10/31/20 1100)  Resp: 19 (10/31/20 1100)  BP: 133/80 (10/31/20 1100)  SpO2: 99 % (10/31/20 1100) Vital Signs (24h Range):  Temp:  [96.5 °F (35.8 °C)-98.2 °F (36.8 °C)] 96.5 °F (35.8 °C)  Pulse:  [] 100  Resp:  [15-25] 19  SpO2:  [94 %-99 %] 99 %  BP: (119-140)/(72-85) 133/80     Weight: 69.4 kg (153 lb)  Body mass index is 26.26 kg/m².     SpO2: 99 %  O2 Device (Oxygen Therapy): room air      Intake/Output Summary (Last 24 hours) at 10/31/2020 1226  Last data filed  at 10/31/2020 0800  Gross per 24 hour   Intake 720 ml   Output 1000 ml   Net -280 ml       Lines/Drains/Airways     Peripheral Intravenous Line                 Peripheral IV - Single Lumen 10/27/20 0636 22 G Left Hand 4 days         Peripheral IV - Single Lumen 10/30/20 1305 20 G Anterior;Distal;Left Forearm less than 1 day                Physical Exam   Constitutional: She appears well-developed and well-nourished. No distress.   HENT:   Head: Normocephalic and atraumatic.   Mouth/Throat: Oropharynx is clear and moist. No oropharyngeal exudate.   Eyes: Conjunctivae and EOM are normal. Right eye exhibits no discharge. Left eye exhibits no discharge. No scleral icterus.   Neck: Normal range of motion. Neck supple. No JVD present. No tracheal deviation present.   Cardiovascular: Normal rate and intact distal pulses. Exam reveals no gallop and no friction rub.   No murmur heard.  Pulmonary/Chest: Effort normal. No respiratory distress. She has no wheezes. She has no rales.   Abdominal: Soft. Bowel sounds are normal. She exhibits no distension. There is no abdominal tenderness.   Musculoskeletal:         General: No edema.   Neurological: She is alert. She exhibits normal muscle tone. Coordination normal.   Skin: Skin is warm and dry. She is not diaphoretic. No erythema.   Psychiatric: She has a normal mood and affect. Her behavior is normal.   Nursing note and vitals reviewed.      Significant Labs: All pertinent lab results from the last 24 hours have been reviewed.    Assessment and Plan:     * Coronary artery disease  - s/p PCI with with VIJAYA x4 on 10/30  - resume ASA 81 mg daily  - resume Brilinta 90 mg Q12H  - resume Lipitor 80 mg QD  - cardiac diet  - strict I/Os and chart  - daily standing weights  - continuous telemetry     CAD (coronary artery disease)  Please see Coronary artery disease.    Hypertension  - resume lisinopril 5 mg daily and Imdur 30 mg daily    Overweight  - BMI 26.26  - RD consulted for  diabetic education prior to discharge    History of coronary artery stent placement  Please see Coronary artery disease.    Diabetes mellitus, type 2  - hemoglobin A1c 9.0% this admission  - per MAR patient only on glargine 15 U daily as outpatient  - continue with current regimen of detemir 14 U QHS with aspart 2 U TIDWM with LDSSI   - continue to titrate insulin accordingly   - diabetic diet  - accuchecks ACHS  - will need Endocrinology follow up as outpatient, referral placed    History of myocardial infarction  Please see Coronary artery disease.        VTE Risk Mitigation (From admission, onward)         Ordered     heparin infusion 1,000 units/500 ml in 0.9% NaCl (pressure line flush)  Intra-op continuous PRN      10/30/20 1327     IP VTE HIGH RISK PATIENT  Once      10/27/20 2335     Place sequential compression device  Until discontinued      10/27/20 2338                Chrissy Bone MD  Cardiology  [facility]

## 2020-10-31 NOTE — CONSULTS
Consult received for diabetic education. Diabetic diet education provided 10/28, please see nutrition progress note for details. Visited pt at bedside today, pt denies further questions about diabetic diet at this time.    RD to continue to monitor.    Thanks,  Ruth Faulkner RD, LD  [id number]

## 2020-10-31 NOTE — DISCHARGE SUMMARY
[facility]  Cardiology  Discharge Summary      Patient Name: Jamee Purvis  MRN: [id number]  Admission Date: 10/27/2020  Hospital Length of Stay: 4 days  Discharge Date and Time:  10/31/2020 12:34 PM  Attending Physician: Austin Aguilar MD    Discharging Provider: Chrissy Bone MD  Primary Care Physician: Ken Barber MD    HPI:   Jamee Purvis is an obese 65 y.o. female with hypertension and DM2 that started having chest discomfort associated jaw pain and mild SO since 3/11/20.. She thought she had a sinus issues and was seen in the ER; however after chest tightness occurred she was admitted and went for a LHC On 3/20/2020 she underwent coronary angiography that revealed severe 3V CAD. She underwent 3 vessel stenting with 5 VIJAYA inserted. She became free from angina.     In Sept 2020 she had recurrence of her exertional angina for which coronary cath was arranged. Patient had LHC at [facility] location on 10/27/2020 with Dr. Robyn Little showing LM: Distal 30%. LAD: Osteal: Stent 50%. Mid stent subtotal. D1: 90%. LCX: Osteal 50%. Mid stent 50%. Distal 80%. RCA: Prox and mid stents patent. AV segment 80%. LV: Anterolateral severe hypokinesia. EF 45%.  No palpitations or weak spells. No bleeding. Feeling fair overall. No interventions have been documented. Patient was transferred to [facility] for surgical evaluation.    Procedure(s) (LRB):  Left heart cath (Bilateral)  IVUS, Coronary  Stent, Drug Eluting, Multi Vessel, Coronary     Indwelling Lines/Drains at time of discharge:  Lines/Drains/Airways     None                 Hospital Course:  Patient presented to [facility]U as a transfer from [facility] on 10/28 for Cardiothoracic Surgery evaluation for CABG after patient underwent elective LHC on 10/27 for exertional angina and was found to have in stent restenosis of the obtuse marginal artery &  of proximal LAD. She was evaluated by Cardiothoracic  Surgery who deemed patient was not a candidate for CABG so Interventional Cardiology here at [facility] was consulted. She underwent left heart catheretization on 10/30 which was remarkable for 75% stenosed ost LAD, 100% stenosed mid %, 80% stenosed 2nd diagonal lesion, 80% stenosed mid circumflex, and 80% stenosed 2nd obtuse marginal for which drug eluding stents were deployed OM-2, mid left circumflex, proximal LAD and mid LAD without complication. She is resumed on DAPT at this time with ASA and Brilinta. She will be referred for cardiac rehab and plans to follow up with Dr. Rizo as outpatient. Given her elevated A1C a referral for endocrinology also given at discharge.     Consults:   Consults (From admission, onward)        Status Ordering Provider     Inpatient consult to Cardiothoracic Surgery  Once     Provider:  (Not yet assigned)    Completed RAMONA CADENA     Inpatient consult to Interventional Cardiology  Once     Provider:  (Not yet assigned)    Completed LEXIE CARVAJAL     Inpatient consult to Registered Dietitian/Nutritionist  Once     Provider:  (Not yet assigned)    Completed LEXIE CARVAJAL     Inpatient consult to Registered Dietitian/Nutritionist  Once     Provider:  (Not yet assigned)    Acknowledged LEXIE CARVAJAL          Significant Diagnostic Studies: Labs: All labs within the past 24 hours have been reviewed    Pending Diagnostic Studies:     None          Final Active Diagnoses:    Diagnosis Date Noted POA    PRINCIPAL PROBLEM:  Coronary artery disease [I25.10] 03/20/2020 Yes    CAD (coronary artery disease) [I25.10] 10/27/2020 Yes    Hypertension [I10] 04/24/2020 Yes    History of coronary artery stent placement [Z95.5] 03/20/2020 Not Applicable    Overweight [E66.3] 03/20/2020 Yes    History of myocardial infarction [I25.2] 03/19/2020 Not Applicable    Diabetes mellitus, type 2 [E11.9] 03/19/2020 Yes      Problems Resolved During this Admission:     No  new Assessment & Plan notes have been filed under this hospital service since the last note was generated.  Service: Cardiology      Discharged Condition: good    Disposition: Home or Self Care    Follow Up:  Follow-up Information     Piter Rizo MD In 4 weeks.    Specialty: INTERVENTIONAL CARDIOLOGY  Contact information:  Preeti DILL  [facility] [id number]  [phone]             [facility] ENDOCRINOLOGY In 1 month.    Specialty: Endocrinology  Contact information:  Preeti Dill  [facility] [address]  [phone]               Patient Instructions:      Ambulatory referral/consult to Cardiac Rehab   Standing Status: Future   Referral Priority: Routine Referral Type: Consultation   Referral Reason: Specialty Services Required   Requested Specialty: Cardiac Rehabilitation   Number of Visits Requested: 1     Ambulatory referral/consult to Endocrinology   Standing Status: Future   Referral Priority: Routine Referral Type: Consultation   Requested Specialty: Endocrinology   Number of Visits Requested: 1     Cardiac rehab phase ii   Standing Status: Future Standing Exp. Date: 10/31/21     Order Specific Question Answer Comments   Department [facility] CARDIAC REHAB    Select qualifying diagnosis: I21.01 - ST elevation (STEMI) myocardial infarction involving left main coronary artery      Medications:  Reconciled Home Medications:      Medication List      START taking these medications    isosorbide mononitrate 30 MG 24 hr tablet  Commonly known as: IMDUR  Take 1 tablet (30 mg total) by mouth once daily.  Start taking on: November 1, 2020     ticagrelor 90 mg tablet  Commonly known as: BRILINTA  Take 1 tablet (90 mg total) by mouth every 12 (twelve) hours.        CHANGE how you take these medications    atorvastatin 80 MG tablet  Commonly known as: LIPITOR  Take 1 tablet (80 mg total) by mouth once daily.  Start taking on: November 1, 2020  What changed:   · medication strength  · how much to  "take     BASAGLAR KWIKPEN U-100 INSULIN glargine 100 units/mL (3mL) SubQ pen  Generic drug: insulin  Inject 15 Units into the skin once daily.  What changed: how much to take     nitroGLYCERIN 0.4 MG SL tablet  Commonly known as: NITROSTAT  Place 1 tablet (0.4 mg total) under the tongue every 5 (five) minutes as needed for Chest pain.  What changed: See the new instructions.        CONTINUE taking these medications    aspirin 81 MG EC tablet  Commonly known as: ECOTRIN  Take 1 tablet (81 mg total) by mouth once daily.     BD ULTRA-FINE MECHE PEN NEEDLE 32 gauge x 5/32" Ndle  Generic drug: pen needle, diabetic  Use once daily.     lisinopriL 5 MG tablet  Commonly known as: PRINIVILZESTRIL  Take 1 tablet (5 mg total) by mouth once daily. Need to establish care with provider for more refills.     TRUE METRIX GLUCOSE METER Misc  Generic drug: blood-glucose meter  USE AS DIRECTED     TRUE METRIX GLUCOSE TEST STRIP Strp  Generic drug: blood sugar diagnostic  1 each by Misc.(Non-Drug; Combo Route) route 3 (three) times daily.     TRUEPLUS LANCETS 30 gauge Misc  Generic drug: lancets  TEST AS DIRECTED  3 (three) times daily.     VITAMIN D ORAL  Take 1 tablet by mouth.        STOP taking these medications    clopidogreL 75 mg tablet  Commonly known as: PLAVIX            Time spent on the discharge of patient: 45 minutes    Chrissy Bone MD  Cardiology  [facility]  "

## 2020-10-31 NOTE — DISCHARGE INSTRUCTIONS
Please follow up with cardiology in 1 month  We also recommend you follow up with Endocrinology given your elevated A1C of 9.0 during this admission. Insulin dosing may need to be adjusted further.  Referral for cardiac rehab provided

## 2020-10-31 NOTE — CARE UPDATE
Was called by nursing staff to evaluate bleeding in this patient after impella assisted PCI earlier in the day. She reportedly had a slow ooze in bilateral groins.    On my exam, she had slow oozing from her right access site after dressing was removed. She had reportedly had a small ooze to her left access site, but on my exam no bleeding or hematoma was appreciated. She had a small hematoma to right side which was tender to touch. Pressure had been held for austin. 5 minutes prior to my arrival and she had no worsening of her hematoma after dressing/pressure removed. Her pulses in her RLE (femoral, DP/PT) were 2+ as were her left LE pulses.    Plan:   Hematoma is very small and not enlarging. At this time will monitor with no intervention. Dressing was replaced to groin and no bleeding has been noted. If she does develop bleeding at access site, will consider injection with lido/epi.

## 2020-10-31 NOTE — PLAN OF CARE
Problem: Adult Inpatient Plan of Care  Goal: Plan of Care Review  Outcome: Ongoing, Progressing   Pt free of falls/traumas/injuries. Skin remains clean, dry, and intact.  S/P LHC bilateral groin sites CDI. Small resolving hematoma to right groin. MD aware.  CBG managed with SSI, and determir. Pt re-educated on importance of measuring accurate intake and out put; pt verbalized and demonstrates understanding. Reviewed plan of care with pt; and pt verbalized understanding.  Pt denies chest pain, or any discomfort, pt resting well. Pt AAOX4, VSS, and in no distress will continue to monitor.

## 2020-10-31 NOTE — SUBJECTIVE & OBJECTIVE
Interval History:   No acute events over night. No active complaints at this time.  Denies chest pain, SOB, palpitations    ROS  Objective:     Vital Signs (Most Recent):  Temp: 96.5 °F (35.8 °C) (10/31/20 0718)  Pulse: 100 (10/31/20 1100)  Resp: 19 (10/31/20 1100)  BP: 133/80 (10/31/20 1100)  SpO2: 99 % (10/31/20 1100) Vital Signs (24h Range):  Temp:  [96.5 °F (35.8 °C)-98.2 °F (36.8 °C)] 96.5 °F (35.8 °C)  Pulse:  [] 100  Resp:  [15-25] 19  SpO2:  [94 %-99 %] 99 %  BP: (119-140)/(72-85) 133/80     Weight: 69.4 kg (153 lb)  Body mass index is 26.26 kg/m².     SpO2: 99 %  O2 Device (Oxygen Therapy): room air      Intake/Output Summary (Last 24 hours) at 10/31/2020 1226  Last data filed at 10/31/2020 0800  Gross per 24 hour   Intake 720 ml   Output 1000 ml   Net -280 ml       Lines/Drains/Airways     Peripheral Intravenous Line                 Peripheral IV - Single Lumen 10/27/20 0636 22 G Left Hand 4 days         Peripheral IV - Single Lumen 10/30/20 1305 20 G Anterior;Distal;Left Forearm less than 1 day                Physical Exam   Constitutional: She appears well-developed and well-nourished. No distress.   HENT:   Head: Normocephalic and atraumatic.   Mouth/Throat: Oropharynx is clear and moist. No oropharyngeal exudate.   Eyes: Conjunctivae and EOM are normal. Right eye exhibits no discharge. Left eye exhibits no discharge. No scleral icterus.   Neck: Normal range of motion. Neck supple. No JVD present. No tracheal deviation present.   Cardiovascular: Normal rate and intact distal pulses. Exam reveals no gallop and no friction rub.   No murmur heard.  Pulmonary/Chest: Effort normal. No respiratory distress. She has no wheezes. She has no rales.   Abdominal: Soft. Bowel sounds are normal. She exhibits no distension. There is no abdominal tenderness.   Musculoskeletal:         General: No edema.   Neurological: She is alert. She exhibits normal muscle tone. Coordination normal.   Skin: Skin is warm and  dry. She is not diaphoretic. No erythema.   Psychiatric: She has a normal mood and affect. Her behavior is normal.   Nursing note and vitals reviewed.      Significant Labs: All pertinent lab results from the last 24 hours have been reviewed.

## 2020-10-31 NOTE — NURSING
Pt discharged per MD orders.  Tele discontinued and returned to station.  IV discontinued; catheter tip intact x.  Medication list and prescriptions reviewed; prescriptions sent to pt via bedside pharmacy.  Pt verbalizes understanding of all written and verbal discharge instructions.  Pt awaiting family/escort arrival.  Will continue to monitor.

## 2020-10-31 NOTE — PLAN OF CARE
Plan of care discussed with patient. Patient is free of fall/trauma/injury. Denies CP, SOB, or pain/discomfort. Pt has bilat groin sites, CDI, small hematoma above right groin site. Pt to be discharged today. All questions addressed. Will continue to monitor       Problem: Diabetes Comorbidity  Goal: Blood Glucose Level Within Desired Range  Outcome: Ongoing, Progressing     Problem: Adult Inpatient Plan of Care  Goal: Plan of Care Review  Outcome: Ongoing, Progressing  Goal: Patient-Specific Goal (Individualization)  Outcome: Ongoing, Progressing  Goal: Absence of Hospital-Acquired Illness or Injury  Outcome: Ongoing, Progressing  Goal: Optimal Comfort and Wellbeing  Outcome: Ongoing, Progressing  Goal: Readiness for Transition of Care  Outcome: Ongoing, Progressing  Goal: Rounds/Family Conference  Outcome: Ongoing, Progressing     Problem: Fall Injury Risk  Goal: Absence of Fall and Fall-Related Injury  Outcome: Ongoing, Progressing     Problem: Wound  Goal: Optimal Wound Healing  Outcome: Ongoing, Progressing

## 2020-10-31 NOTE — PROGRESS NOTES
Bleeding to bilat groin sites. On call cardiology called by day RN, pressure held, hemostasis obtained. Small possible hematoma noted on right groin, and thus outlined. MD will reassess site in 30 min. Continuing to monitor closely.

## 2020-11-02 ENCOUNTER — TELEPHONE (OUTPATIENT)
Dept: CARDIAC REHAB | Facility: CLINIC | Age: 65
End: 2020-11-02

## 2020-11-02 LAB
CATH EF QUANTITATIVE: 45 %
POC ACTIVATED CLOTTING TIME K: 296 SEC (ref 74–137)
POC ACTIVATED CLOTTING TIME K: 400 SEC (ref 74–137)
SAMPLE: ABNORMAL
SAMPLE: ABNORMAL

## 2020-12-03 ENCOUNTER — OFFICE VISIT (OUTPATIENT)
Dept: CARDIOLOGY | Facility: CLINIC | Age: 65
End: 2020-12-03
Attending: INTERNAL MEDICINE
Payer: MEDICAID

## 2020-12-03 VITALS
WEIGHT: 155.44 LBS | SYSTOLIC BLOOD PRESSURE: 129 MMHG | DIASTOLIC BLOOD PRESSURE: 67 MMHG | HEIGHT: 64 IN | BODY MASS INDEX: 26.54 KG/M2 | HEART RATE: 63 BPM

## 2020-12-03 DIAGNOSIS — I25.10 CORONARY ARTERY DISEASE INVOLVING NATIVE CORONARY ARTERY OF NATIVE HEART WITHOUT ANGINA PECTORIS: ICD-10-CM

## 2020-12-03 DIAGNOSIS — I25.2 HISTORY OF MYOCARDIAL INFARCTION: ICD-10-CM

## 2020-12-03 DIAGNOSIS — Z95.5 HISTORY OF CORONARY ARTERY STENT PLACEMENT: ICD-10-CM

## 2020-12-03 DIAGNOSIS — Z79.4 TYPE 2 DIABETES MELLITUS WITH OTHER SPECIFIED COMPLICATION, WITH LONG-TERM CURRENT USE OF INSULIN: ICD-10-CM

## 2020-12-03 DIAGNOSIS — Z79.4 TYPE 2 DIABETES MELLITUS WITH OTHER CIRCULATORY COMPLICATION, WITH LONG-TERM CURRENT USE OF INSULIN: ICD-10-CM

## 2020-12-03 DIAGNOSIS — I10 ESSENTIAL HYPERTENSION: ICD-10-CM

## 2020-12-03 DIAGNOSIS — E11.69 TYPE 2 DIABETES MELLITUS WITH OTHER SPECIFIED COMPLICATION, WITH LONG-TERM CURRENT USE OF INSULIN: ICD-10-CM

## 2020-12-03 DIAGNOSIS — E66.3 OVERWEIGHT: ICD-10-CM

## 2020-12-03 DIAGNOSIS — E11.59 TYPE 2 DIABETES MELLITUS WITH OTHER CIRCULATORY COMPLICATION, WITH LONG-TERM CURRENT USE OF INSULIN: ICD-10-CM

## 2020-12-03 PROCEDURE — 99999 PR PBB SHADOW E&M-EST. PATIENT-LVL III: ICD-10-PCS | Mod: PBBFAC,,, | Performed by: INTERNAL MEDICINE

## 2020-12-03 PROCEDURE — 99215 PR OFFICE/OUTPT VISIT, EST, LEVL V, 40-54 MIN: ICD-10-PCS | Mod: S$PBB,,, | Performed by: INTERNAL MEDICINE

## 2020-12-03 PROCEDURE — 99999 PR PBB SHADOW E&M-EST. PATIENT-LVL III: CPT | Mod: PBBFAC,,, | Performed by: INTERNAL MEDICINE

## 2020-12-03 PROCEDURE — 99213 OFFICE O/P EST LOW 20 MIN: CPT | Mod: PBBFAC | Performed by: INTERNAL MEDICINE

## 2020-12-03 PROCEDURE — 99215 OFFICE O/P EST HI 40 MIN: CPT | Mod: S$PBB,,, | Performed by: INTERNAL MEDICINE

## 2020-12-03 RX ORDER — LISINOPRIL 5 MG/1
5 TABLET ORAL DAILY
Qty: 90 TABLET | Refills: 3 | Status: SHIPPED | OUTPATIENT
Start: 2020-12-03

## 2020-12-03 RX ORDER — NITROGLYCERIN 0.4 MG/1
0.4 TABLET SUBLINGUAL EVERY 5 MIN PRN
Qty: 30 TABLET | Refills: 3 | Status: SHIPPED | OUTPATIENT
Start: 2020-12-03

## 2020-12-03 RX ORDER — ATORVASTATIN CALCIUM 80 MG/1
80 TABLET, FILM COATED ORAL DAILY
Qty: 90 TABLET | Refills: 3 | Status: SHIPPED | OUTPATIENT
Start: 2020-12-03 | End: 2021-12-15

## 2020-12-03 RX ORDER — ASPIRIN 81 MG/1
81 TABLET ORAL DAILY
Qty: 90 TABLET | Refills: 3 | Status: SHIPPED | OUTPATIENT
Start: 2020-12-03

## 2020-12-03 RX ORDER — ISOSORBIDE MONONITRATE 30 MG/1
30 TABLET, EXTENDED RELEASE ORAL DAILY
Qty: 90 TABLET | Refills: 3 | Status: SHIPPED | OUTPATIENT
Start: 2020-12-03 | End: 2021-12-26 | Stop reason: SDUPTHER

## 2020-12-03 RX ORDER — METOPROLOL SUCCINATE 25 MG/1
25 TABLET, EXTENDED RELEASE ORAL DAILY
Qty: 90 TABLET | Refills: 3 | Status: SHIPPED | OUTPATIENT
Start: 2020-12-03

## 2020-12-03 NOTE — PROGRESS NOTES
Subjective:     Jamee Purvis is a 65 y.o. female with hypertension and diabetes mellitus, type 2. She is overweight. Beginning about 3/11/2020 she began feeling jaw pain on and off with mild SOB. She thought she had a sinus issues and was seen in the ER. He occasional jaw pain persisted. In the evening of 3/18/2020 the jaw pain was followed by moderate chest tightness. She became concerned and presented to the ER. She was admitted. On 3/20/2020 she underwent coronary angiography that revealed severe 3V CAD. She underwent 3 vessel stenting with 5 VIJAYA inserted. She became free from angina. In 9/2020 she began experience exertional chest pressure with discomfort radiating to the her jaw. She did not have any chest pain at rest. On 10/27/2020 she underwent repeat angiography and had extensive progression of disease. She was transferred to [facility] for consideration of coronary bypass surgery but not felt to be a candidate due to poor targets, On 10/30/2020 she had PCI with two stents placed in the LAD and two in the LCX. No angina since. She has been walking with a limp since the PCI procedure done on 10/30/2020 she tells me. She denied exertional dyspnea. No palpitations or weak spells. No bleeding. Feeling well overall.       Coronary Artery Disease  Presents for follow-up visit. Pertinent negatives include no chest pain, chest pressure, chest tightness, dizziness, leg swelling, muscle weakness, palpitations, shortness of breath or weight gain. Risk factors include hyperlipidemia. Risk factors do not include obesity. The symptoms have been stable.   Hypertension  This is a new problem. The current episode started more than 1 month ago. The problem is unchanged. The problem is controlled (usaully 120-130/70-80 mmHg at home). Pertinent negatives include no anxiety, blurred vision, chest pain, headaches, malaise/fatigue, neck pain, orthopnea, palpitations, peripheral edema, PND, shortness of breath or sweats. There  is no history of chronic renal disease.   Hyperlipidemia  This is a chronic problem. She has no history of chronic renal disease, diabetes, hypothyroidism, liver disease, obesity or nephrotic syndrome. Pertinent negatives include no chest pain, focal sensory loss, focal weakness, leg pain, myalgias or shortness of breath.       Review of Systems   Constitution: Negative for chills, fever, malaise/fatigue and weight gain.   HENT: Negative for nosebleeds.    Eyes: Negative for blurred vision, double vision, vision loss in left eye and vision loss in right eye.   Cardiovascular: Negative for chest pain, claudication, dyspnea on exertion, irregular heartbeat, leg swelling, near-syncope, orthopnea, palpitations, paroxysmal nocturnal dyspnea and syncope.   Respiratory: Negative for chest tightness, cough, hemoptysis, shortness of breath and wheezing.    Endocrine: Negative for cold intolerance and heat intolerance.   Hematologic/Lymphatic: Negative for bleeding problem. Does not bruise/bleed easily.   Skin: Negative for color change and rash.   Musculoskeletal: Negative for back pain, falls, muscle weakness, myalgias and neck pain.   Gastrointestinal: Negative for heartburn, hematemesis, hematochezia, hemorrhoids, jaundice, melena, nausea and vomiting.   Genitourinary: Negative for dysuria, hematuria and menorrhagia.   Neurological: Positive for tremors. Negative for dizziness, focal weakness, headaches, light-headedness, loss of balance, numbness, vertigo and weakness.   Psychiatric/Behavioral: Negative for altered mental status, depression and memory loss. The patient is not nervous/anxious.    Allergic/Immunologic: Negative for hives and persistent infections.       Current Outpatient Medications on File Prior to Visit   Medication Sig Dispense Refill    aspirin (ECOTRIN) 81 MG EC tablet Take 1 tablet (81 mg total) by mouth once daily. 90 tablet 3    atorvastatin (LIPITOR) 80 MG tablet Take 1 tablet (80 mg total) by  "mouth once daily. (Patient taking differently: Take 40 mg by mouth once daily. ) 90 tablet 3    blood sugar diagnostic Strp 1 each by Misc.(Non-Drug; Combo Route) route 3 (three) times daily. 100 each 3    blood-glucose meter Misc USE AS DIRECTED 1 each 0    ergocalciferol, vitamin D2, (VITAMIN D ORAL) Take 1 tablet by mouth.      insulin glargine 100 units/mL (3mL) SubQ pen Inject 15 Units into the skin once daily. (Patient taking differently: Inject 20 Units into the skin once daily. ) 15 mL 0    isosorbide mononitrate (IMDUR) 30 MG 24 hr tablet Take 1 tablet (30 mg total) by mouth once daily. 30 tablet 11    lancets 30 gauge Misc TEST AS DIRECTED  3 (three) times daily. 100 each 0    lisinopriL (PRINIVIL,ZESTRIL) 5 MG tablet Take 1 tablet (5 mg total) by mouth once daily. Need to establish care with provider for more refills. 90 tablet 3    pen needle, diabetic (BD ULTRA-FINE MECHE PEN NEEDLE) 32 gauge x 5/32" Ndle Use once daily. 100 each 0    ticagrelor (BRILINTA) 90 mg tablet Take 1 tablet (90 mg total) by mouth every 12 (twelve) hours. 60 tablet 11    nitroGLYCERIN (NITROSTAT) 0.4 MG SL tablet Place 1 tablet (0.4 mg total) under the tongue every 5 (five) minutes as needed for Chest pain. 25 tablet 3     No current facility-administered medications on file prior to visit.        /67 (BP Location: Right arm, Patient Position: Sitting, BP Method: Medium (Automatic))   Pulse 63   Ht 5' 4" (1.626 m)   Wt 70.5 kg (155 lb 6.8 oz)   BMI 26.68 kg/m²       Objective:     Physical Exam   Constitutional: She is oriented to person, place, and time. She appears well-developed and well-nourished.  Non-toxic appearance. No distress.   HENT:   Head: Normocephalic and atraumatic.   Nose: Nose normal.   Eyes: Right eye exhibits no discharge. Left eye exhibits no discharge. Right conjunctiva is not injected. Left conjunctiva is not injected. Right pupil is round. Left pupil is round. Pupils are equal.   Neck: " Neck supple. No JVD present. Carotid bruit is not present. No thyromegaly present.   Cardiovascular: Normal rate, regular rhythm, S1 normal and S2 normal.  No extrasystoles are present. PMI is not displaced. Exam reveals gallop and S4. Exam reveals no S3.   Pulses:       Radial pulses are 2+ on the right side and 2+ on the left side.        Femoral pulses are 2+ on the right side and 2+ on the left side.       Dorsalis pedis pulses are 2+ on the right side and 2+ on the left side.        Posterior tibial pulses are 2+ on the right side and 2+ on the left side.   Pulmonary/Chest: Effort normal and breath sounds normal.   Abdominal: Soft. Normal appearance. There is no hepatosplenomegaly. There is no abdominal tenderness.   Musculoskeletal:      Right ankle: She exhibits no swelling, no ecchymosis and no deformity.      Left ankle: She exhibits no swelling, no ecchymosis and no deformity.   Lymphadenopathy:        Head (right side): No submandibular adenopathy present.        Head (left side): No submandibular adenopathy present.     She has no cervical adenopathy.   Neurological: She is alert and oriented to person, place, and time. She is not disoriented. No cranial nerve deficit.   Skin: Skin is warm, dry and intact. No rash noted. She is not diaphoretic.   Psychiatric: She has a normal mood and affect. Her speech is normal and behavior is normal. Judgment and thought content normal. Cognition and memory are normal.        Assessment:     1. Coronary artery disease involving native coronary artery of native heart without angina pectoris    2. History of coronary artery stent placement    3. History of myocardial infarction    4. Essential hypertension    5. Type 2 diabetes mellitus with other specified complication, with long-term current use of insulin    6. Overweight        Plan:      1. Coronary Artery Disease              3/11/2020: Began experience occasional pain in jaw.              3/18/2020: Jaw and chest  pain for 60 minutes. NSTEMI. Troponin 2.2.              3/19/2020: Echo: Normal left ventricular size and overall systolic function. Basal inferior hypokinesia. EF 60%. Moderate diastolic dysfunction. Mildly dilated LA.              3/20/2020: [facility]: Cath: LM: Distal 30%. LAD: Osteal 80%. Mid 70%. LCX: Mid 80%. RCA: Prox 70%. Mid 80%. RCA: VIJAYA 2.5 x 15 mm & 2.5 x 15 mm. LCX: VIJAYA 2.25 x 15 mm. LAD: VIJAYA 2.5 x 12 mm. 2.25 x 18 mm.    9/2020: Recurrence of exertional angina.   10/27/2020: [facility]: Cath: LM: Distal 30%. LAD: Osteal: Stent 50%. Mid stent subtotal. D1: 90%. LCX: Osteal 50%. Mid stent 50%. Distal 80%. RCA: Prox and mid stents patent. AV segment 80%. LV: Anterolateral severe hypokinesia. EF 45%.    10/27/2020: CV Surgical opinion. Too poor targets for CABG.   10/30/2020: C: LAD: VIJAYA x 2. LCX: VIJAYA x 2.              3/19/2020: Chol 171. HDL 58. . TG 59.              On atorvastatin 80 mg Q24.   On isosorbidemononitrate 30 mg Q24.              On aspirin 81 mg Q24.              On ticagrelol 90 mg Q12. for 12 months 11/1/2021.              On NTG 0.4 mg sl PRN.    12/3/2020: Metoprolol 25 mg Q24 to begin.   Medical therapy.     2. Hypertension   2020: Diagnosed.   On lisinopril 5 mg Q24.   Encouraged to check at home.      3. Diabetes Mellitus, Type 2              2006: Diagnosed. Complications: CAD. Medications: On insulin.              3/19/2020: HgbA1C 13.2%.   10/28/2020: HgbA1C 9.0%              Needs excellent control to reduce future risk for more cardiovascular events.   On good diet.     4. Overweight              3/19/2020: Weight 70 kg. BMI 27.     5. Primary Care              Dr. Ken Barber, [facility].     F/u 2 months.    Robyn Little M.D.

## 2021-02-03 ENCOUNTER — OFFICE VISIT (OUTPATIENT)
Dept: CARDIOLOGY | Facility: CLINIC | Age: 66
End: 2021-02-03
Attending: INTERNAL MEDICINE
Payer: MEDICAID

## 2021-02-03 VITALS
SYSTOLIC BLOOD PRESSURE: 125 MMHG | DIASTOLIC BLOOD PRESSURE: 65 MMHG | WEIGHT: 157.75 LBS | BODY MASS INDEX: 26.93 KG/M2 | HEIGHT: 64 IN | HEART RATE: 53 BPM

## 2021-02-03 DIAGNOSIS — E11.59 TYPE 2 DIABETES MELLITUS WITH OTHER CIRCULATORY COMPLICATION, WITH LONG-TERM CURRENT USE OF INSULIN: ICD-10-CM

## 2021-02-03 DIAGNOSIS — Z95.5 HISTORY OF CORONARY ARTERY STENT PLACEMENT: ICD-10-CM

## 2021-02-03 DIAGNOSIS — Z79.4 TYPE 2 DIABETES MELLITUS WITH OTHER CIRCULATORY COMPLICATION, WITH LONG-TERM CURRENT USE OF INSULIN: ICD-10-CM

## 2021-02-03 DIAGNOSIS — I25.10 CORONARY ARTERY DISEASE INVOLVING NATIVE CORONARY ARTERY OF NATIVE HEART WITHOUT ANGINA PECTORIS: ICD-10-CM

## 2021-02-03 DIAGNOSIS — E66.3 OVERWEIGHT: ICD-10-CM

## 2021-02-03 DIAGNOSIS — I25.2 HISTORY OF MYOCARDIAL INFARCTION: ICD-10-CM

## 2021-02-03 DIAGNOSIS — I10 ESSENTIAL HYPERTENSION: ICD-10-CM

## 2021-02-03 PROCEDURE — 99214 PR OFFICE/OUTPT VISIT, EST, LEVL IV, 30-39 MIN: ICD-10-PCS | Mod: S$PBB,,, | Performed by: INTERNAL MEDICINE

## 2021-02-03 PROCEDURE — 99213 OFFICE O/P EST LOW 20 MIN: CPT | Mod: PBBFAC | Performed by: INTERNAL MEDICINE

## 2021-02-03 PROCEDURE — 99999 PR PBB SHADOW E&M-EST. PATIENT-LVL III: ICD-10-PCS | Mod: PBBFAC,,, | Performed by: INTERNAL MEDICINE

## 2021-02-03 PROCEDURE — 99999 PR PBB SHADOW E&M-EST. PATIENT-LVL III: CPT | Mod: PBBFAC,,, | Performed by: INTERNAL MEDICINE

## 2021-02-03 PROCEDURE — 99214 OFFICE O/P EST MOD 30 MIN: CPT | Mod: S$PBB,,, | Performed by: INTERNAL MEDICINE

## 2021-03-08 ENCOUNTER — HOSPITAL ENCOUNTER (EMERGENCY)
Facility: OTHER | Age: 66
Discharge: HOME | End: 2021-03-08
Attending: EMERGENCY MEDICINE
Payer: MEDICAID

## 2021-03-08 VITALS
SYSTOLIC BLOOD PRESSURE: 130 MMHG | DIASTOLIC BLOOD PRESSURE: 66 MMHG | WEIGHT: 156 LBS | RESPIRATION RATE: 19 BRPM | HEIGHT: 64 IN | OXYGEN SATURATION: 100 % | TEMPERATURE: 98 F | BODY MASS INDEX: 26.63 KG/M2 | HEART RATE: 50 BPM

## 2021-03-08 DIAGNOSIS — I25.2 HISTORY OF MYOCARDIAL INFARCTION: ICD-10-CM

## 2021-03-08 DIAGNOSIS — S46.819A STRAIN OF TRAPEZIUS MUSCLE, UNSPECIFIED LATERALITY, INITIAL ENCOUNTER: Primary | ICD-10-CM

## 2021-03-08 LAB
ALBUMIN SERPL BCP-MCNC: 3.7 G/DL (ref 3.5–5.2)
ALP SERPL-CCNC: 106 U/L (ref 55–135)
ALT SERPL W/O P-5'-P-CCNC: 43 U/L (ref 10–44)
ANION GAP SERPL CALC-SCNC: 10 MMOL/L (ref 8–16)
AST SERPL-CCNC: 26 U/L (ref 10–40)
BASOPHILS # BLD AUTO: 0.03 K/UL (ref 0–0.2)
BASOPHILS NFR BLD: 0.3 % (ref 0–1.9)
BILIRUB SERPL-MCNC: 0.3 MG/DL (ref 0.1–1)
BUN SERPL-MCNC: 23 MG/DL (ref 8–23)
CALCIUM SERPL-MCNC: 9.7 MG/DL (ref 8.7–10.5)
CHLORIDE SERPL-SCNC: 105 MMOL/L (ref 95–110)
CO2 SERPL-SCNC: 21 MMOL/L (ref 23–29)
CREAT SERPL-MCNC: 0.9 MG/DL (ref 0.5–1.4)
DIFFERENTIAL METHOD: ABNORMAL
EOSINOPHIL # BLD AUTO: 0.2 K/UL (ref 0–0.5)
EOSINOPHIL NFR BLD: 2.6 % (ref 0–8)
ERYTHROCYTE [DISTWIDTH] IN BLOOD BY AUTOMATED COUNT: 14.4 % (ref 11.5–14.5)
EST. GFR  (AFRICAN AMERICAN): >60 ML/MIN/1.73 M^2
EST. GFR  (NON AFRICAN AMERICAN): >60 ML/MIN/1.73 M^2
GLUCOSE SERPL-MCNC: 116 MG/DL (ref 70–110)
HCT VFR BLD AUTO: 36 % (ref 37–48.5)
HGB BLD-MCNC: 11.3 G/DL (ref 12–16)
IMM GRANULOCYTES # BLD AUTO: 0.03 K/UL (ref 0–0.04)
IMM GRANULOCYTES NFR BLD AUTO: 0.3 % (ref 0–0.5)
LYMPHOCYTES # BLD AUTO: 3.3 K/UL (ref 1–4.8)
LYMPHOCYTES NFR BLD: 37.3 % (ref 18–48)
MCH RBC QN AUTO: 25.9 PG (ref 27–31)
MCHC RBC AUTO-ENTMCNC: 31.4 G/DL (ref 32–36)
MCV RBC AUTO: 83 FL (ref 82–98)
MONOCYTES # BLD AUTO: 0.6 K/UL (ref 0.3–1)
MONOCYTES NFR BLD: 6.9 % (ref 4–15)
NEUTROPHILS # BLD AUTO: 4.6 K/UL (ref 1.8–7.7)
NEUTROPHILS NFR BLD: 52.6 % (ref 38–73)
NRBC BLD-RTO: 0 /100 WBC
PLATELET # BLD AUTO: 147 K/UL (ref 150–350)
PMV BLD AUTO: 12.5 FL (ref 9.2–12.9)
POCT GLUCOSE: 86 MG/DL (ref 70–110)
POTASSIUM SERPL-SCNC: 3.9 MMOL/L (ref 3.5–5.1)
PROT SERPL-MCNC: 7.7 G/DL (ref 6–8.4)
RBC # BLD AUTO: 4.36 M/UL (ref 4–5.4)
SODIUM SERPL-SCNC: 136 MMOL/L (ref 136–145)
TROPONIN I SERPL DL<=0.01 NG/ML-MCNC: 0.01 NG/ML (ref 0–0.03)
WBC # BLD AUTO: 8.84 K/UL (ref 3.9–12.7)

## 2021-03-08 PROCEDURE — 82962 GLUCOSE BLOOD TEST: CPT

## 2021-03-08 PROCEDURE — 99284 EMERGENCY DEPT VISIT MOD MDM: CPT | Mod: 25

## 2021-03-08 PROCEDURE — 80053 COMPREHEN METABOLIC PANEL: CPT | Performed by: PHYSICIAN ASSISTANT

## 2021-03-08 PROCEDURE — 85025 COMPLETE CBC W/AUTO DIFF WBC: CPT | Performed by: PHYSICIAN ASSISTANT

## 2021-03-08 PROCEDURE — 25000003 PHARM REV CODE 250: Performed by: NURSE PRACTITIONER

## 2021-03-08 PROCEDURE — 96372 THER/PROPH/DIAG INJ SC/IM: CPT | Mod: 59

## 2021-03-08 PROCEDURE — 63600175 PHARM REV CODE 636 W HCPCS: Performed by: NURSE PRACTITIONER

## 2021-03-08 PROCEDURE — 93005 ELECTROCARDIOGRAM TRACING: CPT

## 2021-03-08 PROCEDURE — 93010 ELECTROCARDIOGRAM REPORT: CPT | Mod: ,,, | Performed by: INTERNAL MEDICINE

## 2021-03-08 PROCEDURE — 84484 ASSAY OF TROPONIN QUANT: CPT | Performed by: PHYSICIAN ASSISTANT

## 2021-03-08 PROCEDURE — 93010 EKG 12-LEAD: ICD-10-PCS | Mod: ,,, | Performed by: INTERNAL MEDICINE

## 2021-03-08 RX ORDER — METHOCARBAMOL 500 MG/1
1000 TABLET, FILM COATED ORAL
Status: COMPLETED | OUTPATIENT
Start: 2021-03-08 | End: 2021-03-08

## 2021-03-08 RX ORDER — IBUPROFEN 600 MG/1
600 TABLET ORAL EVERY 6 HOURS PRN
Qty: 30 TABLET | Refills: 0 | Status: SHIPPED | OUTPATIENT
Start: 2021-03-08

## 2021-03-08 RX ORDER — LIDOCAINE 50 MG/G
2 PATCH TOPICAL
Status: DISCONTINUED | OUTPATIENT
Start: 2021-03-08 | End: 2021-03-09 | Stop reason: HOSPADM

## 2021-03-08 RX ORDER — METHOCARBAMOL 500 MG/1
1000 TABLET, FILM COATED ORAL 3 TIMES DAILY
Qty: 30 TABLET | Refills: 0 | Status: SHIPPED | OUTPATIENT
Start: 2021-03-08 | End: 2021-03-13

## 2021-03-08 RX ORDER — KETOROLAC TROMETHAMINE 30 MG/ML
10 INJECTION, SOLUTION INTRAMUSCULAR; INTRAVENOUS
Status: COMPLETED | OUTPATIENT
Start: 2021-03-08 | End: 2021-03-08

## 2021-03-08 RX ORDER — LIDOCAINE 50 MG/G
1 PATCH TOPICAL DAILY
Qty: 20 PATCH | Refills: 0 | Status: SHIPPED | OUTPATIENT
Start: 2021-03-08

## 2021-03-08 RX ADMIN — METHOCARBAMOL TABLETS 1000 MG: 500 TABLET, COATED ORAL at 09:03

## 2021-03-08 RX ADMIN — LIDOCAINE 2 PATCH: 50 PATCH TOPICAL at 09:03

## 2021-03-08 RX ADMIN — KETOROLAC TROMETHAMINE 10 MG: 30 INJECTION, SOLUTION INTRAMUSCULAR; INTRAVENOUS at 09:03

## 2021-05-06 ENCOUNTER — HOSPITAL ENCOUNTER (EMERGENCY)
Facility: OTHER | Age: 66
Discharge: HOME | End: 2021-05-06
Attending: EMERGENCY MEDICINE
Payer: MEDICARE

## 2021-05-06 VITALS
HEART RATE: 47 BPM | WEIGHT: 158 LBS | RESPIRATION RATE: 18 BRPM | BODY MASS INDEX: 26.98 KG/M2 | HEIGHT: 64 IN | TEMPERATURE: 98 F | SYSTOLIC BLOOD PRESSURE: 130 MMHG | OXYGEN SATURATION: 100 % | DIASTOLIC BLOOD PRESSURE: 60 MMHG

## 2021-05-06 DIAGNOSIS — M25.512 LEFT SHOULDER PAIN: ICD-10-CM

## 2021-05-06 DIAGNOSIS — M62.838 TRAPEZIUS MUSCLE SPASM: Primary | ICD-10-CM

## 2021-05-06 LAB
ALBUMIN SERPL BCP-MCNC: 3.6 G/DL (ref 3.5–5.2)
ALP SERPL-CCNC: 126 U/L (ref 55–135)
ALT SERPL W/O P-5'-P-CCNC: 34 U/L (ref 10–44)
ANION GAP SERPL CALC-SCNC: 10 MMOL/L (ref 8–16)
AST SERPL-CCNC: 20 U/L (ref 10–40)
BASOPHILS # BLD AUTO: 0.04 K/UL (ref 0–0.2)
BASOPHILS NFR BLD: 0.5 % (ref 0–1.9)
BILIRUB SERPL-MCNC: 0.5 MG/DL (ref 0.1–1)
BUN SERPL-MCNC: 15 MG/DL (ref 8–23)
CALCIUM SERPL-MCNC: 9.6 MG/DL (ref 8.7–10.5)
CHLORIDE SERPL-SCNC: 107 MMOL/L (ref 95–110)
CO2 SERPL-SCNC: 18 MMOL/L (ref 23–29)
CREAT SERPL-MCNC: 0.8 MG/DL (ref 0.5–1.4)
DIFFERENTIAL METHOD: ABNORMAL
EOSINOPHIL # BLD AUTO: 0.3 K/UL (ref 0–0.5)
EOSINOPHIL NFR BLD: 3.3 % (ref 0–8)
ERYTHROCYTE [DISTWIDTH] IN BLOOD BY AUTOMATED COUNT: 14.2 % (ref 11.5–14.5)
EST. GFR  (AFRICAN AMERICAN): >60 ML/MIN/1.73 M^2
EST. GFR  (NON AFRICAN AMERICAN): >60 ML/MIN/1.73 M^2
GLUCOSE SERPL-MCNC: 263 MG/DL (ref 70–110)
HCT VFR BLD AUTO: 37.1 % (ref 37–48.5)
HCV AB SERPL QL IA: NEGATIVE
HGB BLD-MCNC: 12.1 G/DL (ref 12–16)
IMM GRANULOCYTES # BLD AUTO: 0.02 K/UL (ref 0–0.04)
IMM GRANULOCYTES NFR BLD AUTO: 0.3 % (ref 0–0.5)
LYMPHOCYTES # BLD AUTO: 2 K/UL (ref 1–4.8)
LYMPHOCYTES NFR BLD: 27.1 % (ref 18–48)
MCH RBC QN AUTO: 26.3 PG (ref 27–31)
MCHC RBC AUTO-ENTMCNC: 32.6 G/DL (ref 32–36)
MCV RBC AUTO: 81 FL (ref 82–98)
MONOCYTES # BLD AUTO: 0.5 K/UL (ref 0.3–1)
MONOCYTES NFR BLD: 6.9 % (ref 4–15)
NEUTROPHILS # BLD AUTO: 4.7 K/UL (ref 1.8–7.7)
NEUTROPHILS NFR BLD: 61.9 % (ref 38–73)
NRBC BLD-RTO: 0 /100 WBC
PLATELET # BLD AUTO: 137 K/UL (ref 150–450)
PMV BLD AUTO: 12.5 FL (ref 9.2–12.9)
POCT GLUCOSE: 205 MG/DL (ref 70–110)
POCT GLUCOSE: 259 MG/DL (ref 70–110)
POCT GLUCOSE: 279 MG/DL (ref 70–110)
POTASSIUM SERPL-SCNC: 4.1 MMOL/L (ref 3.5–5.1)
PROT SERPL-MCNC: 7.8 G/DL (ref 6–8.4)
RBC # BLD AUTO: 4.6 M/UL (ref 4–5.4)
SODIUM SERPL-SCNC: 135 MMOL/L (ref 136–145)
TROPONIN I SERPL DL<=0.01 NG/ML-MCNC: 0.01 NG/ML (ref 0–0.03)
WBC # BLD AUTO: 7.54 K/UL (ref 3.9–12.7)

## 2021-05-06 PROCEDURE — 80053 COMPREHEN METABOLIC PANEL: CPT | Performed by: EMERGENCY MEDICINE

## 2021-05-06 PROCEDURE — 99285 EMERGENCY DEPT VISIT HI MDM: CPT | Mod: 25

## 2021-05-06 PROCEDURE — 25000003 PHARM REV CODE 250: Performed by: PHYSICIAN ASSISTANT

## 2021-05-06 PROCEDURE — 85025 COMPLETE CBC W/AUTO DIFF WBC: CPT | Performed by: EMERGENCY MEDICINE

## 2021-05-06 PROCEDURE — 82962 GLUCOSE BLOOD TEST: CPT | Mod: 91

## 2021-05-06 PROCEDURE — 96375 TX/PRO/DX INJ NEW DRUG ADDON: CPT

## 2021-05-06 PROCEDURE — 93005 ELECTROCARDIOGRAM TRACING: CPT

## 2021-05-06 PROCEDURE — 96374 THER/PROPH/DIAG INJ IV PUSH: CPT

## 2021-05-06 PROCEDURE — 93010 EKG 12-LEAD: ICD-10-PCS | Mod: ,,, | Performed by: INTERNAL MEDICINE

## 2021-05-06 PROCEDURE — 96361 HYDRATE IV INFUSION ADD-ON: CPT

## 2021-05-06 PROCEDURE — 86803 HEPATITIS C AB TEST: CPT | Performed by: EMERGENCY MEDICINE

## 2021-05-06 PROCEDURE — 63600175 PHARM REV CODE 636 W HCPCS: Performed by: EMERGENCY MEDICINE

## 2021-05-06 PROCEDURE — 84484 ASSAY OF TROPONIN QUANT: CPT | Performed by: EMERGENCY MEDICINE

## 2021-05-06 PROCEDURE — 93010 ELECTROCARDIOGRAM REPORT: CPT | Mod: ,,, | Performed by: INTERNAL MEDICINE

## 2021-05-06 RX ORDER — ORPHENADRINE CITRATE 100 MG/1
100 TABLET, EXTENDED RELEASE ORAL 2 TIMES DAILY PRN
Qty: 25 TABLET | Refills: 0 | Status: SHIPPED | OUTPATIENT
Start: 2021-05-06 | End: 2021-05-16

## 2021-05-06 RX ORDER — ORPHENADRINE CITRATE 30 MG/ML
30 INJECTION INTRAMUSCULAR; INTRAVENOUS
Status: COMPLETED | OUTPATIENT
Start: 2021-05-06 | End: 2021-05-06

## 2021-05-06 RX ORDER — KETOROLAC TROMETHAMINE 30 MG/ML
15 INJECTION, SOLUTION INTRAMUSCULAR; INTRAVENOUS
Status: COMPLETED | OUTPATIENT
Start: 2021-05-06 | End: 2021-05-06

## 2021-05-06 RX ADMIN — ORPHENADRINE CITRATE 30 MG: 60 INJECTION INTRAMUSCULAR; INTRAVENOUS at 11:05

## 2021-05-06 RX ADMIN — SODIUM CHLORIDE 500 ML: 0.9 INJECTION, SOLUTION INTRAVENOUS at 12:05

## 2021-05-06 RX ADMIN — KETOROLAC TROMETHAMINE 15 MG: 30 INJECTION, SOLUTION INTRAMUSCULAR; INTRAVENOUS at 11:05

## 2021-05-06 RX ADMIN — INSULIN HUMAN 5 UNITS: 100 INJECTION, SOLUTION PARENTERAL at 02:05

## 2021-06-21 ENCOUNTER — CLINICAL SUPPORT (OUTPATIENT)
Dept: REHABILITATION | Facility: HOSPITAL | Age: 66
End: 2021-06-21
Attending: NURSE PRACTITIONER
Payer: MEDICARE

## 2021-06-21 DIAGNOSIS — S46.819A STRAIN OF TRAPEZIUS MUSCLE, UNSPECIFIED LATERALITY, INITIAL ENCOUNTER: ICD-10-CM

## 2021-06-21 DIAGNOSIS — M25.512 ACUTE PAIN OF LEFT SHOULDER: ICD-10-CM

## 2021-06-21 DIAGNOSIS — M25.612 DECREASED RANGE OF MOTION OF LEFT SHOULDER: ICD-10-CM

## 2021-06-21 DIAGNOSIS — R29.898 WEAKNESS OF LEFT UPPER EXTREMITY: ICD-10-CM

## 2021-06-21 PROCEDURE — 97010 HOT OR COLD PACKS THERAPY: CPT | Mod: PO

## 2021-06-21 PROCEDURE — 97161 PT EVAL LOW COMPLEX 20 MIN: CPT | Mod: PO

## 2021-06-21 PROCEDURE — 97110 THERAPEUTIC EXERCISES: CPT | Mod: PO

## 2021-06-21 PROCEDURE — 97140 MANUAL THERAPY 1/> REGIONS: CPT | Mod: PO

## 2021-07-27 ENCOUNTER — DOCUMENTATION ONLY (OUTPATIENT)
Dept: REHABILITATION | Facility: HOSPITAL | Age: 66
End: 2021-07-27

## 2023-01-01 NOTE — PLAN OF CARE
Plan of care discussed with patient.NPO at MD for PCI in am.  Patient is free of fall/trauma/injury. Denies CP, SOB, or pain/discomfort. All questions addressed. Will continue to monitor     prematurity

## 2023-08-30 ENCOUNTER — HOSPITAL ENCOUNTER (EMERGENCY)
Facility: OTHER | Age: 68
Discharge: HOME | End: 2023-08-30
Attending: EMERGENCY MEDICINE
Payer: MEDICARE

## 2023-08-30 VITALS
WEIGHT: 164 LBS | HEIGHT: 66 IN | OXYGEN SATURATION: 100 % | TEMPERATURE: 98 F | SYSTOLIC BLOOD PRESSURE: 131 MMHG | DIASTOLIC BLOOD PRESSURE: 61 MMHG | RESPIRATION RATE: 17 BRPM | HEART RATE: 45 BPM | BODY MASS INDEX: 26.36 KG/M2

## 2023-08-30 DIAGNOSIS — N30.90 CYSTITIS: Primary | ICD-10-CM

## 2023-08-30 DIAGNOSIS — R30.0 DYSURIA: ICD-10-CM

## 2023-08-30 LAB
BACTERIA #/AREA URNS HPF: ABNORMAL /HPF
BILIRUB UR QL STRIP: NEGATIVE
CLARITY UR: ABNORMAL
COLOR UR: YELLOW
GLUCOSE UR QL STRIP: NEGATIVE
HGB UR QL STRIP: ABNORMAL
HYALINE CASTS #/AREA URNS LPF: 0 /LPF
KETONES UR QL STRIP: NEGATIVE
LEUKOCYTE ESTERASE UR QL STRIP: ABNORMAL
MICROSCOPIC COMMENT: ABNORMAL
NITRITE UR QL STRIP: NEGATIVE
PH UR STRIP: 6 [PH] (ref 5–8)
PROT UR QL STRIP: ABNORMAL
RBC #/AREA URNS HPF: >100 /HPF (ref 0–4)
SP GR UR STRIP: 1.02 (ref 1–1.03)
SQUAMOUS #/AREA URNS HPF: 1 /HPF
URN SPEC COLLECT METH UR: ABNORMAL
UROBILINOGEN UR STRIP-ACNC: NEGATIVE EU/DL
WBC #/AREA URNS HPF: >100 /HPF (ref 0–5)
YEAST URNS QL MICRO: ABNORMAL

## 2023-08-30 PROCEDURE — 25000003 PHARM REV CODE 250: Performed by: EMERGENCY MEDICINE

## 2023-08-30 PROCEDURE — 81000 URINALYSIS NONAUTO W/SCOPE: CPT | Performed by: EMERGENCY MEDICINE

## 2023-08-30 PROCEDURE — 99284 EMERGENCY DEPT VISIT MOD MDM: CPT

## 2023-08-30 PROCEDURE — 87086 URINE CULTURE/COLONY COUNT: CPT | Performed by: EMERGENCY MEDICINE

## 2023-08-30 RX ORDER — PHENAZOPYRIDINE HYDROCHLORIDE 200 MG/1
200 TABLET, FILM COATED ORAL 3 TIMES DAILY
Qty: 6 TABLET | Refills: 0 | Status: SHIPPED | OUTPATIENT
Start: 2023-08-30 | End: 2023-09-09

## 2023-08-30 RX ORDER — CEPHALEXIN 500 MG/1
500 CAPSULE ORAL
Status: COMPLETED | OUTPATIENT
Start: 2023-08-30 | End: 2023-08-30

## 2023-08-30 RX ORDER — CEPHALEXIN 500 MG/1
500 CAPSULE ORAL EVERY 8 HOURS
Qty: 15 CAPSULE | Refills: 0 | Status: SHIPPED | OUTPATIENT
Start: 2023-08-30 | End: 2023-09-04

## 2023-08-30 RX ADMIN — CEPHALEXIN 500 MG: 500 CAPSULE ORAL at 08:08

## 2023-08-31 NOTE — ED TRIAGE NOTES
Pt c/o dysuria x5 days. Reports having diabetes with significant hx of UTI. Reports last time she felt this way she was placed on abx with resolve in symptoms. NADN.

## 2023-08-31 NOTE — ED PROVIDER NOTES
Encounter Date: 2023       History     Chief Complaint   Patient presents with    Dysuria     Pain with any attempt to urinate over the past 5 days  Also states she noticed today the light pink when she wipes.     Pleasant 67 yo woman presenting with dysuria and mild suprapubic discomfort. She denies fevers or chills, she has not taken anything to help, nothing makes it better or worse. She was concerned as she has significant comorbid health issues including MI, stent and HTN. She denies chest pain shortness of breath or other complaints.       Review of patient's allergies indicates:  No Known Allergies  Past Medical History:   Diagnosis Date    Anticoagulant long-term use     plavix    Coronary artery disease 2020    stents x 5    Diabetes mellitus     Hypertension 2020    Overweight 3/20/2020     3/19/2020: Weight 70 kg. BMI 27.       Past Surgical History:   Procedure Laterality Date    BREAST SURGERY Right     lumpectomy   benign     SECTION      x1    HYSTERECTOMY      LEFT HEART CATHETERIZATION Bilateral 10/30/2020    Procedure: Left heart cath;  Surgeon: Piter Rizo MD;  Location: [facility] CATH LAB;  Service: Cardiology;  Laterality: Bilateral;    LEFT HEART CATHETERIZATION N/A 10/27/2020    Procedure: HEART CATH-LEFT;  Surgeon: Robyn iLttle MD;  Location: [facility] CATH LAB;  Service: Cardiology;  Laterality: N/A;     No family history on file.  Social History     Tobacco Use    Smoking status: Never    Smokeless tobacco: Never   Substance Use Topics    Alcohol use: No    Drug use: No     Review of Systems  Constitutional-no fever  HEENT-no congestion  Eyes-no redness  Respiratory-no shortness of breath  Cardio-no chest pain  GI-no abdominal pain  Endocrine-no cold intolerance  -no difficulty urinating +dysuria  MSK-no myalgias  Skin-no rashes  Allergy-no environmental allergy  Neurologic-, no headache  Hematology-no swollen nodes  Behavioral-no confusion   Physical Exam      Initial Vitals [08/30/23 1846]   BP Pulse Resp Temp SpO2   (!) 158/70 62 17 98.1 °F (36.7 °C) 97 %      MAP       --         Physical Exam  Constitutional: Well appearing, no distress.  Eyes: Conjunctivae normal.  ENT       Head: Normocephalic, atraumatic.       Nose: Normal external appearance        Mouth/Throat: no strigulous respirations   Hematological/Lymphatic/Immunilogical: no visible lymphadenopathy   Cardiovascular: Normal rate,   Respiratory: Normal respiratory effort.   Gastrointestinal: non distended   Musculoskeletal: Normal range of motion in all extremities. No obvious deformities or swelling.  Neurologic: Alert, oriented. Normal speech and language. No gross focal neurologic deficits are appreciated.  Skin: Skin is warm, dry. No rash noted.  Psychiatric: Mood and affect are normal.    ED Course   Procedures  Labs Reviewed   URINALYSIS, REFLEX TO URINE CULTURE - Abnormal; Notable for the following components:       Result Value    Appearance, UA Cloudy (*)     Protein, UA 1+ (*)     Occult Blood UA 3+ (*)     Leukocytes, UA 2+ (*)     All other components within normal limits    Narrative:     Specimen Source->Urine   URINALYSIS MICROSCOPIC - Abnormal; Notable for the following components:    RBC, UA >100 (*)     WBC, UA >100 (*)     Yeast, UA Occasional (*)     All other components within normal limits    Narrative:     Specimen Source->Urine   CULTURE, URINE          Imaging Results    None          Medications   cephALEXin capsule 500 mg (500 mg Oral Given 8/30/23 2015)     Medical Decision Making  Abdominal pain represents a profoundly broad differential, this patient's symptoms are nondescript in nature and while unlikely could represent-  Pancreatitis, cholecystitis, appendicitis, gastritis, cystitis, pyleonephritis, PUD, obstructions constipation neoplasm among a myriad of other diagnoses.     A thorough examination and history was undertaken and appropriate diagnostics ordered with a  diagnosis identified as below based on summative findings.   Because the broad differential in potential for changes in symptoms and discussion was also undertaken related to the importance of follow-up return or acknowledgement of new or changes in symptoms.       Amount and/or Complexity of Data Reviewed  External Data Reviewed: labs, radiology, ECG and notes.     Details: Significant cardiac activity with stents and known CAD  Labs: ordered. Decision-making details documented in ED Course.    Risk  OTC drugs.  Prescription drug management.                               Clinical Impression:   Final diagnoses:  [N30.90] Cystitis (Primary)  [R30.0] Dysuria        ED Disposition Condition    Discharge Stable          ED Prescriptions       Medication Sig Dispense Start Date End Date Auth. Provider    cephALEXin (KEFLEX) 500 MG capsule Take 1 capsule (500 mg total) by mouth every 8 (eight) hours. for 5 days 15 capsule 8/30/2023 9/4/2023 Priyank Tellez MD    phenazopyridine (PYRIDIUM) 200 MG tablet Take 1 tablet (200 mg total) by mouth 3 (three) times daily. for 10 days 6 tablet 8/30/2023 9/9/2023 Priyank Tellez MD          Follow-up Information       Follow up With Specialties Details Why Contact Info    Ken Barber MD Family Medicine Call in 2 days If symptoms worsen, For a follow up visit about today [address] [facility] [address]  [phone]               Priyank Tellez MD  08/30/23 6155

## 2023-08-31 NOTE — DISCHARGE INSTRUCTIONS
Mrs. Purvis,    Thank you for letting me care for you today! It was nice meeting you, and I hope you feel better soon.   If you would like access to your chart and what was done today please utilize the [facility].   Please come back to [facility] for all of your future medical needs.    Our goal in the emergency department is to always give you outstanding care and exceptional service. You may receive a survey by mail or e-mail in the next week regarding your experience in our ED. We would greatly appreciate you completing and returning the survey. Your feedback provides us with a way to recognize our staff who give very good care and it helps us learn how to improve when your experience was below our aspiration of excellence.     Sincerely,    Priyank Tellez MD  Board Certified Emergency Physician

## 2023-09-01 LAB — BACTERIA UR CULT: ABNORMAL

## (undated) DEVICE — MANIFOLD PERCEPTOR MP 3P RH ON

## (undated) DEVICE — DRESSING TRANS 4X4 TEGADERM

## (undated) DEVICE — GUIDE LAUNCHER 6FR EBU 3.0

## (undated) DEVICE — CATH INFINITI 4F JL4 .042X100

## (undated) DEVICE — CATH ANG PIGTAIL 4FR INFINITY

## (undated) DEVICE — GUIDE LAUNCHER 6FR JR 4.0

## (undated) DEVICE — GLOVE PROTEXIS HYDROGEL SZ7.5

## (undated) DEVICE — CATH TURNPIKE SPIRAL 5FR 135CM

## (undated) DEVICE — INTRODUCER CATH 4F 11CM

## (undated) DEVICE — CATH EAGLE EYE PLATINUM

## (undated) DEVICE — GUIDEWIRE EMERALD 150CM PTFE

## (undated) DEVICE — BLLN SC EUPHORA RX 2.00MMX15MM

## (undated) DEVICE — BAG DRAINAGE W/SPIKE

## (undated) DEVICE — SHEATH INTRODUCER 5F 10CM

## (undated) DEVICE — WIRE PILOT .014X190

## (undated) DEVICE — SHEATH INTRODUCER 6FR 11CM

## (undated) DEVICE — OMNIPAQUE 350MG 150ML VIAL

## (undated) DEVICE — CHLORAPREP 1.5 ML FREPP

## (undated) DEVICE — GUIDE LAUNCHER 6FR EBU 3.5

## (undated) DEVICE — SEE MEDLINE ITEM 156894

## (undated) DEVICE — INTRODUCER BRITE TIP 8F 35CM

## (undated) DEVICE — SHEATH INTRODUCER 8FR 11CM

## (undated) DEVICE — CATH INFINITI 4F PIG 110CM 6SH

## (undated) DEVICE — STOPCOCK 3 WAY MED PRESSURE

## (undated) DEVICE — TRAY CORONARY CUSTOM BAPTIST

## (undated) DEVICE — BLLN SC EUPHORA RX 2.50MMX15MM

## (undated) DEVICE — GUIDEWIRE 145CM .035

## (undated) DEVICE — OMNIPAQUE 350 200ML

## (undated) DEVICE — SYR 10ML LIDOCAINE

## (undated) DEVICE — KIT CO-PILOT

## (undated) DEVICE — GUIDE WIRE BMW 014 X190

## (undated) DEVICE — NDL PERC ENTRY BSDN 18-7.0

## (undated) DEVICE — CATH BLLN FG APEX MR 2.50X12MM

## (undated) DEVICE — CATH ANGIO GUIDE EBU3.5 8FRX90

## (undated) DEVICE — KIT MICROINTRO 4F .018X40X7CM

## (undated) DEVICE — Device

## (undated) DEVICE — COVER SANP CLR 36X54

## (undated) DEVICE — CATH TREK RX 3.0MM X 20MM

## (undated) DEVICE — GLOVE BIOGEL ORTHOPEDIC 7.5

## (undated) DEVICE — KIT PROBE COVER WITH GEL

## (undated) DEVICE — INTRODUCER CATH 6F 11CM

## (undated) DEVICE — CATH NC QUANTUM APEX MR 3X8

## (undated) DEVICE — CATH INFINITI JUDKINS JR4

## (undated) DEVICE — KIT CUSTOM INFLATION DEV

## (undated) DEVICE — KIT CUSTOM MANIFOLD

## (undated) DEVICE — GLOVE BIOGEL SKINSENSE PI 8.0

## (undated) DEVICE — SPIKE CONTRAST CONTROLLER

## (undated) DEVICE — WIRE GUIDE HI TRQ BAL

## (undated) DEVICE — CATH TREK RX 2.50MM X 15MM

## (undated) DEVICE — CATH MINI TREK RX 2.0MM X 12MM